# Patient Record
Sex: FEMALE | Race: WHITE | NOT HISPANIC OR LATINO | Employment: OTHER | URBAN - METROPOLITAN AREA
[De-identification: names, ages, dates, MRNs, and addresses within clinical notes are randomized per-mention and may not be internally consistent; named-entity substitution may affect disease eponyms.]

---

## 2023-09-05 ENCOUNTER — HOSPITAL ENCOUNTER (OUTPATIENT)
Dept: DATA CONVERSION | Facility: HOSPITAL | Age: 68
Discharge: HOME | End: 2023-09-05
Payer: MEDICARE

## 2023-09-05 DIAGNOSIS — Z91.81 HISTORY OF FALLING: ICD-10-CM

## 2023-09-05 DIAGNOSIS — Z88.2 ALLERGY STATUS TO SULFONAMIDES: ICD-10-CM

## 2023-09-05 DIAGNOSIS — M11.252: ICD-10-CM

## 2023-09-05 DIAGNOSIS — R26.2 DIFFICULTY IN WALKING, NOT ELSEWHERE CLASSIFIED: ICD-10-CM

## 2023-09-05 DIAGNOSIS — Z91.040 LATEX ALLERGY STATUS: ICD-10-CM

## 2023-09-05 DIAGNOSIS — M16.11 UNILATERAL PRIMARY OSTEOARTHRITIS, RIGHT HIP: ICD-10-CM

## 2023-09-05 DIAGNOSIS — M11.251 OTHER CHONDROCALCINOSIS, RIGHT HIP: ICD-10-CM

## 2023-09-05 DIAGNOSIS — M25.551 PAIN IN RIGHT HIP: ICD-10-CM

## 2023-09-05 DIAGNOSIS — Z88.0 ALLERGY STATUS TO PENICILLIN: ICD-10-CM

## 2023-09-05 LAB
ALBUMIN SERPL-MCNC: 4.3 GM/DL (ref 3.5–5)
ALBUMIN/GLOB SERPL: 1.4 RATIO (ref 1.5–3)
ALP BLD-CCNC: 70 U/L (ref 35–125)
ALT SERPL-CCNC: 19 U/L (ref 5–40)
ANION GAP SERPL CALCULATED.3IONS-SCNC: 13 MMOL/L (ref 0–19)
AST SERPL-CCNC: 22 U/L (ref 5–40)
BASOPHILS # BLD AUTO: 0.04 K/UL (ref 0–0.22)
BASOPHILS NFR BLD AUTO: 0.7 % (ref 0–1)
BILIRUB SERPL-MCNC: 0.3 MG/DL (ref 0.1–1.2)
BUN SERPL-MCNC: 37 MG/DL (ref 8–25)
BUN/CREAT SERPL: 37 RATIO (ref 8–21)
CALCIUM SERPL-MCNC: 9.7 MG/DL (ref 8.5–10.4)
CHLORIDE SERPL-SCNC: 97 MMOL/L (ref 97–107)
CK SERPL-CCNC: 82 U/L (ref 24–195)
CO2 SERPL-SCNC: 25 MMOL/L (ref 24–31)
CREAT SERPL-MCNC: 1 MG/DL (ref 0.4–1.6)
DEPRECATED RDW RBC AUTO: 47.5 FL (ref 37–54)
DIFFERENTIAL METHOD BLD: ABNORMAL
EOSINOPHIL # BLD AUTO: 0.08 K/UL (ref 0–0.45)
EOSINOPHIL NFR BLD: 1.4 % (ref 0–3)
ERYTHROCYTE [DISTWIDTH] IN BLOOD BY AUTOMATED COUNT: 13.2 % (ref 11.7–15)
GFR SERPL CREATININE-BSD FRML MDRD: 62 ML/MIN/1.73 M2
GLOBULIN SER-MCNC: 3.1 G/DL (ref 1.9–3.7)
GLUCOSE SERPL-MCNC: 152 MG/DL (ref 65–99)
HCT VFR BLD AUTO: 34.8 % (ref 36–44)
HGB BLD-MCNC: 11.7 GM/DL (ref 12–15)
IMM GRANULOCYTES # BLD AUTO: 0.02 K/UL (ref 0–0.1)
LYMPHOCYTES # BLD AUTO: 0.89 K/UL (ref 1.2–3.2)
LYMPHOCYTES NFR BLD MANUAL: 15.8 % (ref 20–40)
MCH RBC QN AUTO: 32.7 PG (ref 26–34)
MCHC RBC AUTO-ENTMCNC: 33.6 % (ref 31–37)
MCV RBC AUTO: 97.2 FL (ref 80–100)
MONOCYTES # BLD AUTO: 0.54 K/UL (ref 0–0.8)
MONOCYTES NFR BLD MANUAL: 9.6 % (ref 0–8)
NEUTROPHILS # BLD AUTO: 4.05 K/UL
NEUTROPHILS # BLD AUTO: 4.05 K/UL (ref 1.8–7.7)
NEUTROPHILS.IMMATURE NFR BLD: 0.4 % (ref 0–1)
NEUTS SEG NFR BLD: 72.1 % (ref 50–70)
NRBC BLD-RTO: 0 /100 WBC
PLATELET # BLD AUTO: 257 K/UL (ref 150–450)
PMV BLD AUTO: 10.2 CU (ref 7–12.6)
POTASSIUM SERPL-SCNC: 3.9 MMOL/L (ref 3.4–5.1)
PROT SERPL-MCNC: 7.4 G/DL (ref 5.9–7.9)
RBC # BLD AUTO: 3.58 M/UL (ref 4–4.9)
SODIUM SERPL-SCNC: 135 MMOL/L (ref 133–145)
WBC # BLD AUTO: 5.6 K/UL (ref 4.5–11)

## 2023-10-03 ENCOUNTER — TELEPHONE (OUTPATIENT)
Dept: PRIMARY CARE | Facility: CLINIC | Age: 68
End: 2023-10-03
Payer: MEDICARE

## 2023-10-03 NOTE — TELEPHONE ENCOUNTER
Delicia with [facility] called to refer patient to services. Pt is currently seen by Dr. Leighann Cantu but has a hard time getting to office d/t difficulty walking and chronic pain. Pt follows with pain management for pain but needs primary care at home. Confirmed demographics with Delicia.

## 2023-10-11 PROBLEM — M54.50 CHRONIC LOW BACK PAIN: Status: ACTIVE | Noted: 2023-10-11

## 2023-10-11 PROBLEM — G89.29 CHRONIC LOW BACK PAIN: Status: ACTIVE | Noted: 2023-10-11

## 2023-10-11 PROBLEM — M15.9 GENERALIZED OSTEOARTHRITIS OF MULTIPLE SITES: Status: ACTIVE | Noted: 2023-10-11

## 2023-10-11 PROBLEM — D50.9 IRON DEFICIENCY ANEMIA: Status: ACTIVE | Noted: 2023-10-11

## 2023-10-11 PROBLEM — R20.0 NUMBNESS: Status: ACTIVE | Noted: 2023-10-11

## 2023-10-11 PROBLEM — E66.9 OBESITY WITH BODY MASS INDEX 30 OR GREATER: Status: ACTIVE | Noted: 2023-10-11

## 2023-10-11 PROBLEM — K43.2 INCISIONAL HERNIA: Status: ACTIVE | Noted: 2023-10-11

## 2023-10-11 PROBLEM — K64.9 HEMORRHOIDS WITHOUT COMPLICATION: Status: ACTIVE | Noted: 2023-10-11

## 2023-10-11 PROBLEM — D72.819 LEUKOPENIA: Status: ACTIVE | Noted: 2023-10-11

## 2023-10-11 PROBLEM — M47.812 ARTHRITIS OF NECK: Status: ACTIVE | Noted: 2023-10-11

## 2023-10-11 PROBLEM — F41.9 ANXIETY: Status: ACTIVE | Noted: 2023-10-11

## 2023-10-11 PROBLEM — M54.12 CERVICAL RADICULAR PAIN: Status: ACTIVE | Noted: 2023-10-11

## 2023-10-11 PROBLEM — M54.16 PAIN, RADICULAR, LUMBAR: Status: ACTIVE | Noted: 2023-10-11

## 2023-10-11 PROBLEM — M96.1 POSTLAMINECTOMY SYNDROME, NOT ELSEWHERE CLASSIFIED: Status: ACTIVE | Noted: 2023-10-11

## 2023-10-11 PROBLEM — M54.16 LUMBAR RADICULITIS: Status: ACTIVE | Noted: 2023-10-11

## 2023-10-11 PROBLEM — T14.8XXA SKIN EXCORIATION: Status: ACTIVE | Noted: 2023-10-11

## 2023-10-11 PROBLEM — N82.4 COLOVAGINAL FISTULA: Status: ACTIVE | Noted: 2023-10-11

## 2023-10-11 PROBLEM — L40.9 PSORIASIS: Status: ACTIVE | Noted: 2023-10-11

## 2023-10-11 PROBLEM — I87.8 POOR VENOUS ACCESS: Status: ACTIVE | Noted: 2023-10-11

## 2023-10-11 PROBLEM — R74.01 ALT (SGPT) LEVEL RAISED: Status: ACTIVE | Noted: 2023-10-11

## 2023-10-11 PROBLEM — N17.9 ACUTE RENAL FAILURE SYNDROME (CMS-HCC): Status: ACTIVE | Noted: 2023-10-11

## 2023-10-11 PROBLEM — H53.10 DISTURBANCE, VISUAL, SUBJECTIVE: Status: ACTIVE | Noted: 2023-10-11

## 2023-10-11 PROBLEM — M19.90 ARTHRITIS: Status: ACTIVE | Noted: 2023-10-11

## 2023-10-11 PROBLEM — H53.8 BLURRED VISION, BILATERAL: Status: ACTIVE | Noted: 2023-10-11

## 2023-10-11 PROBLEM — E06.3 AUTOIMMUNE THYROIDITIS: Status: ACTIVE | Noted: 2023-10-11

## 2023-10-11 PROBLEM — M79.89 SWELLING OF UPPER ARM: Status: ACTIVE | Noted: 2023-10-11

## 2023-10-11 PROBLEM — E78.2 MIXED HYPERLIPIDEMIA: Status: ACTIVE | Noted: 2023-10-11

## 2023-10-11 PROBLEM — N82.9 FISTULA INVOLVING FEMALE GENITAL TRACT: Status: ACTIVE | Noted: 2023-10-11

## 2023-10-11 PROBLEM — R41.82 ALTERED MENTAL STATUS: Status: ACTIVE | Noted: 2023-10-11

## 2023-10-11 PROBLEM — R15.9 FECAL SOILING DUE TO FECAL INCONTINENCE: Status: ACTIVE | Noted: 2023-10-11

## 2023-10-11 PROBLEM — N30.00 ACUTE CYSTITIS WITHOUT HEMATURIA: Status: ACTIVE | Noted: 2023-10-11

## 2023-10-11 PROBLEM — H01.009 BLEPHARITIS: Status: ACTIVE | Noted: 2023-10-11

## 2023-10-11 PROBLEM — R26.2 DIFFICULTY WALKING: Status: ACTIVE | Noted: 2023-10-11

## 2023-10-11 PROBLEM — R40.1 CLOUDED CONSCIOUSNESS: Status: ACTIVE | Noted: 2023-10-11

## 2023-10-11 PROBLEM — S12.9XXA FRACTURE OF CERVICAL VERTEBRA (MULTI): Status: ACTIVE | Noted: 2023-10-11

## 2023-10-11 PROBLEM — I10 ESSENTIAL (PRIMARY) HYPERTENSION: Status: ACTIVE | Noted: 2023-10-11

## 2023-10-11 PROBLEM — G89.29 CHRONIC INTRACTABLE PAIN: Status: ACTIVE | Noted: 2023-10-11

## 2023-10-11 PROBLEM — E88.1: Status: ACTIVE | Noted: 2023-10-11

## 2023-10-11 PROBLEM — L25.9 CONTACT DERMATITIS: Status: ACTIVE | Noted: 2023-10-11

## 2023-10-11 PROBLEM — H35.00 RETINOPATHY, BACKGROUND, NONPROLIFERATIVE, MILD: Status: ACTIVE | Noted: 2023-10-11

## 2023-10-11 PROBLEM — K59.00 CONSTIPATION: Status: ACTIVE | Noted: 2023-10-11

## 2023-10-11 PROBLEM — M51.34 DDD (DEGENERATIVE DISC DISEASE), THORACIC: Status: ACTIVE | Noted: 2023-10-11

## 2023-10-11 PROBLEM — H25.13 CATARACT, NUCLEAR SCLEROTIC, BOTH EYES: Status: ACTIVE | Noted: 2023-10-11

## 2023-10-11 PROBLEM — H52.7 DISORDER OF REFRACTION: Status: ACTIVE | Noted: 2023-10-11

## 2023-10-11 PROBLEM — D31.32 CHOROIDAL NEVUS, LEFT EYE: Status: ACTIVE | Noted: 2023-10-11

## 2023-10-11 PROBLEM — N89.9 VAGINAL DISORDER: Status: ACTIVE | Noted: 2023-10-11

## 2023-10-11 PROBLEM — M79.10 MYALGIA: Status: ACTIVE | Noted: 2023-10-11

## 2023-10-11 PROBLEM — H02.839 DERMATOCHALASIS: Status: ACTIVE | Noted: 2023-10-11

## 2023-10-11 PROBLEM — H26.9 EARLY CATARACT: Status: ACTIVE | Noted: 2023-10-11

## 2023-10-11 PROBLEM — K21.9 GASTROESOPHAGEAL REFLUX DISEASE: Status: ACTIVE | Noted: 2023-10-11

## 2023-10-11 PROBLEM — N32.81 OVERACTIVE BLADDER: Status: ACTIVE | Noted: 2023-10-11

## 2023-10-11 PROBLEM — H50.10 CONSECUTIVE EXOTROPIA: Status: ACTIVE | Noted: 2023-10-11

## 2023-10-11 PROBLEM — R19.4 CHANGE IN BOWEL HABITS: Status: ACTIVE | Noted: 2023-10-11

## 2023-10-11 PROBLEM — N39.0 RECURRENT URINARY TRACT INFECTION: Status: ACTIVE | Noted: 2023-10-11

## 2023-10-11 PROBLEM — H51.0 PALSY OF CONJUGATE GAZE: Status: ACTIVE | Noted: 2023-10-11

## 2023-10-11 PROBLEM — E10.9 CONTROLLED INSULIN DEPENDENT TYPE 1 DIABETES MELLITUS (MULTI): Status: ACTIVE | Noted: 2023-10-11

## 2023-10-11 PROBLEM — M48.062 NEUROGENIC CLAUDICATION DUE TO LUMBAR SPINAL STENOSIS: Status: ACTIVE | Noted: 2023-10-11

## 2023-10-11 PROBLEM — E11.9 TYPE 2 DIABETES MELLITUS (MULTI): Status: ACTIVE | Noted: 2023-10-11

## 2023-10-11 PROBLEM — M79.18 MYOFASCIAL PAIN: Status: ACTIVE | Noted: 2023-10-11

## 2023-10-11 PROBLEM — L03.90 CELLULITIS: Status: ACTIVE | Noted: 2023-10-11

## 2023-10-11 PROBLEM — R74.8 ELEVATED CREATINE KINASE: Status: ACTIVE | Noted: 2023-10-11

## 2023-10-11 PROBLEM — R15.9 ANAL SPHINCTER INCONTINENCE: Status: ACTIVE | Noted: 2023-10-11

## 2023-10-11 PROBLEM — H04.123 DRY EYES: Status: ACTIVE | Noted: 2023-10-11

## 2023-10-11 PROBLEM — M79.89 SOFT TISSUE MASS: Status: ACTIVE | Noted: 2023-10-11

## 2023-10-11 PROBLEM — Z96.1 PSEUDOPHAKIA OF BOTH EYES: Status: ACTIVE | Noted: 2023-10-11

## 2023-10-11 PROBLEM — L40.50 PSORIATIC ARTHRITIS (MULTI): Status: ACTIVE | Noted: 2023-10-11

## 2023-10-11 PROBLEM — K57.30 DIVERTICULOSIS OF COLON: Status: ACTIVE | Noted: 2023-10-11

## 2023-10-11 PROBLEM — N82.3 FISTULA OF VAGINA TO LARGE INTESTINE: Status: ACTIVE | Noted: 2023-10-11

## 2023-10-11 PROBLEM — R53.1 WEAKNESS GENERALIZED: Status: ACTIVE | Noted: 2023-10-11

## 2023-10-11 RX ORDER — BUPROPION HYDROCHLORIDE 150 MG/1
150 TABLET ORAL DAILY
COMMUNITY
End: 2023-10-12 | Stop reason: ALTCHOICE

## 2023-10-11 RX ORDER — ACETAMINOPHEN 500 MG
5 TABLET ORAL NIGHTLY PRN
COMMUNITY
End: 2023-10-12 | Stop reason: ALTCHOICE

## 2023-10-11 RX ORDER — METFORMIN HYDROCHLORIDE 500 MG/1
500 TABLET ORAL 2 TIMES DAILY
COMMUNITY
End: 2023-10-12 | Stop reason: ALTCHOICE

## 2023-10-11 RX ORDER — HEPARIN 100 UNIT/ML
500 SYRINGE INTRAVENOUS AS NEEDED
Status: CANCELLED | OUTPATIENT
Start: 2023-10-11

## 2023-10-11 RX ORDER — ACETAMINOPHEN 325 MG/1
650 TABLET ORAL EVERY 4 HOURS PRN
COMMUNITY

## 2023-10-11 RX ORDER — BUPROPION HYDROCHLORIDE 300 MG/1
TABLET ORAL
COMMUNITY
Start: 2021-09-17 | End: 2023-10-12 | Stop reason: ALTCHOICE

## 2023-10-11 RX ORDER — BIOTIN 1 MG
1000 TABLET ORAL DAILY
COMMUNITY
End: 2023-10-12 | Stop reason: ALTCHOICE

## 2023-10-11 RX ORDER — LIDOCAINE 50 MG/G
1 PATCH TOPICAL DAILY
COMMUNITY
End: 2023-10-12

## 2023-10-11 RX ORDER — ACYCLOVIR 50 MG/G
OINTMENT TOPICAL
COMMUNITY
Start: 2021-06-04 | End: 2023-10-12 | Stop reason: ALTCHOICE

## 2023-10-11 RX ORDER — OXYBUTYNIN CHLORIDE 5 MG/1
5 TABLET ORAL 2 TIMES DAILY
COMMUNITY
Start: 2018-05-22 | End: 2023-10-12 | Stop reason: ALTCHOICE

## 2023-10-11 RX ORDER — ALBUTEROL SULFATE 0.83 MG/ML
3 SOLUTION RESPIRATORY (INHALATION) AS NEEDED
Status: CANCELLED | OUTPATIENT
Start: 2023-10-11

## 2023-10-11 RX ORDER — NAPROXEN SODIUM 220 MG/1
81 TABLET, FILM COATED ORAL DAILY
COMMUNITY

## 2023-10-11 RX ORDER — PHENYLEPHRINE HCL 10 MG
TABLET ORAL DAILY
COMMUNITY
End: 2023-10-12 | Stop reason: ALTCHOICE

## 2023-10-11 RX ORDER — CALC/MAG/B COMPLEX/D3/HERB 61
15 TABLET ORAL DAILY
COMMUNITY
End: 2023-10-12 | Stop reason: ALTCHOICE

## 2023-10-11 RX ORDER — PREDNISONE 10 MG/1
TABLET ORAL
COMMUNITY
Start: 2023-09-13 | End: 2023-10-12 | Stop reason: ALTCHOICE

## 2023-10-11 RX ORDER — PIOGLITAZONEHYDROCHLORIDE 30 MG/1
1 TABLET ORAL DAILY
COMMUNITY
Start: 2021-10-04 | End: 2023-10-12 | Stop reason: ALTCHOICE

## 2023-10-11 RX ORDER — DOCUSATE SODIUM 100 MG/1
100 CAPSULE, LIQUID FILLED ORAL 2 TIMES DAILY PRN
COMMUNITY
Start: 2021-12-13

## 2023-10-11 RX ORDER — EPINEPHRINE 0.3 MG/.3ML
0.3 INJECTION SUBCUTANEOUS EVERY 5 MIN PRN
Status: CANCELLED | OUTPATIENT
Start: 2023-10-11

## 2023-10-11 RX ORDER — CEPHALEXIN 500 MG/1
500 CAPSULE ORAL 3 TIMES DAILY
COMMUNITY
Start: 2023-06-28 | End: 2023-10-12 | Stop reason: ALTCHOICE

## 2023-10-11 RX ORDER — INSULIN GLARGINE 300 U/ML
6 INJECTION, SOLUTION SUBCUTANEOUS DAILY
COMMUNITY
End: 2023-10-12 | Stop reason: ALTCHOICE

## 2023-10-11 RX ORDER — POLYETHYLENE GLYCOL 3350 17 G/17G
17 POWDER, FOR SOLUTION ORAL EVERY MORNING
COMMUNITY
End: 2023-10-12 | Stop reason: ALTCHOICE

## 2023-10-11 RX ORDER — LISINOPRIL 10 MG/1
TABLET ORAL
COMMUNITY
Start: 2015-05-15 | End: 2023-10-12 | Stop reason: ALTCHOICE

## 2023-10-11 RX ORDER — CHOLECALCIFEROL (VITAMIN D3) 50 MCG
1 TABLET ORAL DAILY
COMMUNITY
End: 2023-10-12 | Stop reason: ALTCHOICE

## 2023-10-11 RX ORDER — LEFLUNOMIDE 10 MG/1
10 TABLET ORAL DAILY
COMMUNITY
End: 2023-10-12 | Stop reason: SDUPTHER

## 2023-10-11 RX ORDER — ESCITALOPRAM OXALATE 10 MG/1
TABLET ORAL
COMMUNITY
Start: 2021-09-17 | End: 2023-10-12 | Stop reason: ALTCHOICE

## 2023-10-11 RX ORDER — MAGNESIUM HYDROXIDE 400 MG/5ML
1 SUSPENSION, ORAL (FINAL DOSE FORM) ORAL DAILY
COMMUNITY

## 2023-10-11 RX ORDER — OMEGA-3 FATTY ACIDS 1000 MG
1 CAPSULE ORAL DAILY
COMMUNITY
End: 2023-10-12 | Stop reason: ALTCHOICE

## 2023-10-11 RX ORDER — ATORVASTATIN CALCIUM 40 MG/1
1 TABLET, FILM COATED ORAL DAILY
COMMUNITY
Start: 2021-10-04 | End: 2023-10-12 | Stop reason: SDUPTHER

## 2023-10-11 RX ORDER — MAGNESIUM 250 MG
1 TABLET ORAL DAILY
COMMUNITY
End: 2023-10-12 | Stop reason: ALTCHOICE

## 2023-10-11 RX ORDER — INSULIN DEGLUDEC 100 U/ML
8 INJECTION, SOLUTION SUBCUTANEOUS EVERY MORNING
COMMUNITY
End: 2024-04-19 | Stop reason: SDUPTHER

## 2023-10-11 RX ORDER — NALOXONE HYDROCHLORIDE 4 MG/.1ML
SPRAY NASAL
COMMUNITY

## 2023-10-11 RX ORDER — BLOOD SUGAR DIAGNOSTIC
STRIP MISCELLANEOUS DAILY
COMMUNITY
End: 2024-05-10 | Stop reason: SDUPTHER

## 2023-10-11 RX ORDER — LEVOTHYROXINE SODIUM 100 UG/1
100 TABLET ORAL DAILY
COMMUNITY
Start: 2011-11-20 | End: 2023-10-12 | Stop reason: DRUGHIGH

## 2023-10-11 RX ORDER — BISACODYL 10 MG/1
10 SUPPOSITORY RECTAL DAILY
COMMUNITY
Start: 2018-12-07 | End: 2023-10-12 | Stop reason: ALTCHOICE

## 2023-10-11 RX ORDER — VANCOMYCIN HYDROCHLORIDE 250 MG/1
CAPSULE ORAL
COMMUNITY
Start: 2021-10-20 | End: 2023-10-12 | Stop reason: ALTCHOICE

## 2023-10-11 RX ORDER — OXYBUTYNIN CHLORIDE 5 MG/1
5 TABLET, EXTENDED RELEASE ORAL DAILY
COMMUNITY
End: 2023-10-12 | Stop reason: ALTCHOICE

## 2023-10-11 RX ORDER — SYRING-NEEDL,DISP,INSUL,0.3 ML 29 G X1/2"
SYRINGE, EMPTY DISPOSABLE MISCELLANEOUS
COMMUNITY
Start: 2016-09-26 | End: 2023-10-12 | Stop reason: ALTCHOICE

## 2023-10-11 RX ORDER — CLINDAMYCIN HYDROCHLORIDE 300 MG/1
300 CAPSULE ORAL 3 TIMES DAILY
COMMUNITY
Start: 2023-03-02 | End: 2023-10-12 | Stop reason: ALTCHOICE

## 2023-10-11 RX ORDER — CYCLOBENZAPRINE HCL 10 MG
10 TABLET ORAL 2 TIMES DAILY PRN
COMMUNITY
Start: 2023-09-18 | End: 2023-10-30 | Stop reason: SDUPTHER

## 2023-10-11 RX ORDER — LIDOCAINE 560 MG/1
1 PATCH PERCUTANEOUS; TOPICAL; TRANSDERMAL DAILY PRN
COMMUNITY
Start: 2023-09-05 | End: 2023-10-12

## 2023-10-11 RX ORDER — CYCLOBENZAPRINE HCL 10 MG
10 TABLET ORAL 3 TIMES DAILY PRN
COMMUNITY
Start: 2019-11-11 | End: 2023-10-12 | Stop reason: DRUGHIGH

## 2023-10-11 RX ORDER — MUPIROCIN 20 MG/G
OINTMENT TOPICAL
COMMUNITY
Start: 2022-02-08 | End: 2023-10-12 | Stop reason: ALTCHOICE

## 2023-10-11 RX ORDER — DAPAGLIFLOZIN 10 MG/1
10 TABLET, FILM COATED ORAL DAILY
COMMUNITY
End: 2023-10-12

## 2023-10-11 RX ORDER — ESCITALOPRAM OXALATE 5 MG/1
5 TABLET ORAL DAILY
COMMUNITY
Start: 2021-12-13 | End: 2023-10-12 | Stop reason: ALTCHOICE

## 2023-10-11 RX ORDER — VITAMIN E MIXED 400 UNIT
1 CAPSULE ORAL DAILY
COMMUNITY
End: 2023-10-12 | Stop reason: ALTCHOICE

## 2023-10-11 RX ORDER — GLYBURIDE 2.5 MG/1
2.5 TABLET ORAL
COMMUNITY
End: 2023-10-12 | Stop reason: ALTCHOICE

## 2023-10-11 RX ORDER — FUROSEMIDE 40 MG/1
1 TABLET ORAL DAILY
COMMUNITY
Start: 2011-08-03 | End: 2023-10-12 | Stop reason: SDUPTHER

## 2023-10-11 RX ORDER — FAMOTIDINE 10 MG/ML
20 INJECTION INTRAVENOUS ONCE AS NEEDED
Status: CANCELLED | OUTPATIENT
Start: 2023-10-11

## 2023-10-11 RX ORDER — HYDROMORPHONE HYDROCHLORIDE 4 MG/1
4 TABLET ORAL EVERY 6 HOURS PRN
COMMUNITY
Start: 2022-11-09 | End: 2023-10-12 | Stop reason: DRUGHIGH

## 2023-10-11 RX ORDER — MAGNESIUM 200 MG
2 TABLET ORAL DAILY
COMMUNITY
End: 2023-10-12 | Stop reason: ALTCHOICE

## 2023-10-11 RX ORDER — METFORMIN HYDROCHLORIDE 500 MG/1
500 TABLET, EXTENDED RELEASE ORAL 2 TIMES DAILY
COMMUNITY
Start: 2023-09-10 | End: 2023-10-12 | Stop reason: ALTCHOICE

## 2023-10-11 RX ORDER — PRAVASTATIN SODIUM 40 MG/1
40 TABLET ORAL DAILY
COMMUNITY
Start: 2019-08-05 | End: 2023-10-12 | Stop reason: ALTCHOICE

## 2023-10-11 RX ORDER — HEPARIN SODIUM,PORCINE/PF 10 UNIT/ML
50 SYRINGE (ML) INTRAVENOUS AS NEEDED
OUTPATIENT
Start: 2023-10-11

## 2023-10-11 RX ORDER — ZINC GLUCONATE 13.3 MG
1 LOZENGE ORAL 2 TIMES DAILY PRN
COMMUNITY
Start: 2021-12-13 | End: 2023-10-12 | Stop reason: ALTCHOICE

## 2023-10-11 RX ORDER — LEFLUNOMIDE 20 MG/1
20 TABLET ORAL DAILY
COMMUNITY
End: 2023-10-12 | Stop reason: DRUGHIGH

## 2023-10-11 RX ORDER — NAPROXEN SODIUM 220 MG/1
1 TABLET ORAL DAILY
COMMUNITY
End: 2023-10-12 | Stop reason: ALTCHOICE

## 2023-10-11 RX ORDER — TRIMETHOPRIM 100 MG/1
100 TABLET ORAL DAILY
COMMUNITY
End: 2023-10-12 | Stop reason: ALTCHOICE

## 2023-10-11 RX ORDER — BUPROPION HYDROCHLORIDE 100 MG/1
50 TABLET ORAL DAILY
COMMUNITY
End: 2023-10-12 | Stop reason: SDUPTHER

## 2023-10-11 RX ORDER — INSULIN LISPRO 100 [IU]/ML
INJECTION, SOLUTION INTRAVENOUS; SUBCUTANEOUS
COMMUNITY
End: 2023-10-12 | Stop reason: ALTCHOICE

## 2023-10-11 RX ORDER — LISINOPRIL 20 MG/1
20 TABLET ORAL DAILY
COMMUNITY
End: 2023-10-12 | Stop reason: SDUPTHER

## 2023-10-11 RX ORDER — LEVOTHYROXINE SODIUM 75 UG/1
TABLET ORAL
COMMUNITY
Start: 2021-09-17 | End: 2023-10-12 | Stop reason: SDUPTHER

## 2023-10-11 RX ORDER — HYDROMORPHONE HYDROCHLORIDE 2 MG/1
2 TABLET ORAL 2 TIMES DAILY
COMMUNITY
End: 2023-10-30 | Stop reason: SDUPTHER

## 2023-10-11 RX ORDER — GABAPENTIN 300 MG/1
300 CAPSULE ORAL
COMMUNITY
Start: 2019-03-05 | End: 2023-10-30 | Stop reason: SDUPTHER

## 2023-10-11 RX ORDER — TRAMADOL HYDROCHLORIDE 50 MG/1
50 TABLET ORAL EVERY 6 HOURS PRN
COMMUNITY
Start: 2023-06-30 | End: 2023-10-12 | Stop reason: ALTCHOICE

## 2023-10-11 RX ORDER — HYDROGEN PEROXIDE 3 %
1 SOLUTION, NON-ORAL MISCELLANEOUS DAILY
COMMUNITY
End: 2023-10-12 | Stop reason: ALTCHOICE

## 2023-10-11 RX ORDER — DIPHENHYDRAMINE HYDROCHLORIDE 50 MG/ML
50 INJECTION INTRAMUSCULAR; INTRAVENOUS AS NEEDED
Status: CANCELLED | OUTPATIENT
Start: 2023-10-11

## 2023-10-12 ENCOUNTER — OFFICE VISIT (OUTPATIENT)
Dept: PRIMARY CARE | Facility: CLINIC | Age: 68
End: 2023-10-12
Payer: MEDICARE

## 2023-10-12 VITALS
RESPIRATION RATE: 16 BRPM | WEIGHT: 152 LBS | SYSTOLIC BLOOD PRESSURE: 100 MMHG | DIASTOLIC BLOOD PRESSURE: 58 MMHG | BODY MASS INDEX: 29.69 KG/M2 | HEART RATE: 75 BPM | OXYGEN SATURATION: 93 % | TEMPERATURE: 99.2 F

## 2023-10-12 DIAGNOSIS — L40.50 PSORIATIC ARTHRITIS (MULTI): ICD-10-CM

## 2023-10-12 DIAGNOSIS — R26.2 DIFFICULTY WALKING: ICD-10-CM

## 2023-10-12 DIAGNOSIS — R60.0 LOWER EXTREMITY EDEMA: ICD-10-CM

## 2023-10-12 DIAGNOSIS — M54.50 CHRONIC LOW BACK PAIN, UNSPECIFIED BACK PAIN LATERALITY, UNSPECIFIED WHETHER SCIATICA PRESENT: ICD-10-CM

## 2023-10-12 DIAGNOSIS — M15.9 GENERALIZED OSTEOARTHRITIS OF MULTIPLE SITES: ICD-10-CM

## 2023-10-12 DIAGNOSIS — G89.29 CHRONIC LOW BACK PAIN, UNSPECIFIED BACK PAIN LATERALITY, UNSPECIFIED WHETHER SCIATICA PRESENT: ICD-10-CM

## 2023-10-12 DIAGNOSIS — I10 ESSENTIAL (PRIMARY) HYPERTENSION: ICD-10-CM

## 2023-10-12 DIAGNOSIS — F41.9 ANXIETY: ICD-10-CM

## 2023-10-12 DIAGNOSIS — E06.3 AUTOIMMUNE THYROIDITIS: ICD-10-CM

## 2023-10-12 DIAGNOSIS — E11.9 TYPE 2 DIABETES MELLITUS WITHOUT COMPLICATION, WITH LONG-TERM CURRENT USE OF INSULIN (MULTI): Primary | ICD-10-CM

## 2023-10-12 DIAGNOSIS — Z79.4 TYPE 2 DIABETES MELLITUS WITHOUT COMPLICATION, WITH LONG-TERM CURRENT USE OF INSULIN (MULTI): Primary | ICD-10-CM

## 2023-10-12 DIAGNOSIS — E78.2 MIXED HYPERLIPIDEMIA: ICD-10-CM

## 2023-10-12 PROCEDURE — 1159F MED LIST DOCD IN RCRD: CPT | Performed by: NURSE PRACTITIONER

## 2023-10-12 PROCEDURE — 4010F ACE/ARB THERAPY RXD/TAKEN: CPT | Performed by: NURSE PRACTITIONER

## 2023-10-12 PROCEDURE — 3074F SYST BP LT 130 MM HG: CPT | Performed by: NURSE PRACTITIONER

## 2023-10-12 PROCEDURE — 3078F DIAST BP <80 MM HG: CPT | Performed by: NURSE PRACTITIONER

## 2023-10-12 PROCEDURE — 1125F AMNT PAIN NOTED PAIN PRSNT: CPT | Performed by: NURSE PRACTITIONER

## 2023-10-12 PROCEDURE — 1160F RVW MEDS BY RX/DR IN RCRD: CPT | Performed by: NURSE PRACTITIONER

## 2023-10-12 PROCEDURE — 3044F HG A1C LEVEL LT 7.0%: CPT | Performed by: NURSE PRACTITIONER

## 2023-10-12 PROCEDURE — 99350 HOME/RES VST EST HIGH MDM 60: CPT | Performed by: NURSE PRACTITIONER

## 2023-10-12 RX ORDER — BUPROPION HYDROCHLORIDE 100 MG/1
50 TABLET ORAL DAILY
Qty: 45 TABLET | Refills: 2 | Status: SHIPPED | OUTPATIENT
Start: 2023-10-12 | End: 2023-11-10 | Stop reason: SDUPTHER

## 2023-10-12 RX ORDER — FUROSEMIDE 40 MG/1
40 TABLET ORAL DAILY
Qty: 90 TABLET | Refills: 3 | Status: SHIPPED | OUTPATIENT
Start: 2023-10-12 | End: 2024-10-11

## 2023-10-12 RX ORDER — DAPAGLIFLOZIN 10 MG/1
10 TABLET, FILM COATED ORAL DAILY
Qty: 90 TABLET | Refills: 3 | Status: SHIPPED | OUTPATIENT
Start: 2023-10-12 | End: 2024-05-15

## 2023-10-12 RX ORDER — GABAPENTIN 300 MG/1
900 CAPSULE ORAL NIGHTLY
COMMUNITY
End: 2024-02-26 | Stop reason: WASHOUT

## 2023-10-12 RX ORDER — DAPAGLIFLOZIN 10 MG/1
10 TABLET, FILM COATED ORAL DAILY
COMMUNITY
End: 2023-10-12 | Stop reason: SDUPTHER

## 2023-10-12 RX ORDER — ATORVASTATIN CALCIUM 40 MG/1
40 TABLET, FILM COATED ORAL DAILY
Qty: 90 TABLET | Refills: 2 | Status: SHIPPED | OUTPATIENT
Start: 2023-10-12 | End: 2024-05-20

## 2023-10-12 RX ORDER — LEFLUNOMIDE 10 MG/1
10 TABLET ORAL DAILY
Qty: 90 TABLET | Refills: 3 | Status: SHIPPED | OUTPATIENT
Start: 2023-10-12 | End: 2024-10-11

## 2023-10-12 RX ORDER — LEVOTHYROXINE SODIUM 75 UG/1
75 TABLET ORAL
Qty: 90 TABLET | Refills: 3 | Status: SHIPPED | OUTPATIENT
Start: 2023-10-12 | End: 2024-10-11

## 2023-10-12 RX ORDER — LISINOPRIL 20 MG/1
20 TABLET ORAL DAILY
Qty: 90 TABLET | Refills: 3 | Status: SHIPPED | OUTPATIENT
Start: 2023-10-12 | End: 2024-10-11

## 2023-10-12 ASSESSMENT — ENCOUNTER SYMPTOMS
SHORTNESS OF BREATH: 1
MYALGIAS: 1
EYES NEGATIVE: 1
BRUISES/BLEEDS EASILY: 1
DIARRHEA: 1
ARTHRALGIAS: 1
FATIGUE: 1
DEPRESSION: 1
PSYCHIATRIC NEGATIVE: 1
LOSS OF SENSATION IN FEET: 0
JOINT SWELLING: 1
NECK PAIN: 1
ENDOCRINE NEGATIVE: 1
NECK STIFFNESS: 1
BACK PAIN: 1
OCCASIONAL FEELINGS OF UNSTEADINESS: 1
WEAKNESS: 1

## 2023-10-12 ASSESSMENT — PAIN SCALES - GENERAL: PAINLEVEL: 7

## 2023-10-12 NOTE — PROGRESS NOTES
Subjective   Patient ID: Nesha Mcleod is a 68 y.o. female who presents to establish care with house calls program.    HPI Pt seen in private apartment in [facility]. Pt has been residing in a couple months.  passed away in March. Pt with limited mobility secondary to severe arthritis throughout body. Pt states she has had 59 surgeries in the past. The latest being shoulder surgery in June 2023. Pt lives alone and has some help from her nephew. She did have a niece that was involved with her care who passed away suddenly two weeks ago. Pt independent with her ADL's. Has shower chair and grab bars in bathroom. Uses crock pot or microwave for meals as she can not stand for long periods of time. Pt drives once in while to get her groceries. Pt was referred to house calls from her home care [facility] which finished yesterday. Pts PCP is Dr. Cantu but pt says she is no longer going to see her. Pt follows with pain management Dr. Cisneros whom she sees every 3 months. Pt states she is never pain free. Reports she does try to do some chair exercises. Pt checks blood sugars daily reports they mainly run in 120-130's but with an occasional 170 when she eats improperly. Pt only taking her Tresiba when her blood sugar is elevated above 170. Pt ran out of her Jardiance a week ago and can not afford it as it is over $500 a month. Last A1c was 5.7 on 9/23. Pt with hospital bed in her living room that she is no longer using. States she was using it when she was having more difficulty walking but she can now get to her bedroom to sleep. Pt receiving golimumab infusions at infusion center for her psoriatic arthritis  every 8 weeks which helps her pain.  Pt denies fevers, chills, night sweats, fevers, or headaches. Denies vision or hearing changes. Denies difficulty with swallowing or choking issues. Denies cough, occasional SOB. Denies CP or palpitations. C/O lower extremity edema. Denies N/V admits  to occasional diarrhea and constipation. States appetite is fair and she is sleeping well.    Medically necessary due to: pt has chronic condition that makes access to a traditional office visit very difficult, illness or condition that results in activity limitation or restriction that impacts the ability to leave home such as; unsteady gait/ poor condition.    Review of Systems   Constitutional:  Positive for fatigue.   HENT: Negative.     Eyes: Negative.    Respiratory:  Positive for shortness of breath.    Cardiovascular:  Positive for leg swelling.   Gastrointestinal:  Positive for diarrhea.   Endocrine: Negative.    Musculoskeletal:  Positive for arthralgias, back pain, gait problem, joint swelling, myalgias, neck pain and neck stiffness.   Skin:  Positive for pallor.   Allergic/Immunologic: Positive for environmental allergies and food allergies.   Neurological:  Positive for weakness.   Hematological:  Bruises/bleeds easily.   Psychiatric/Behavioral: Negative.         Objective   /58 (BP Location: Right arm)   Pulse 75   Temp 37.3 °C (99.2 °F) (Temporal)   Resp 16   Wt 68.9 kg (152 lb)   SpO2 93%   BMI 29.69 kg/m²     Physical Exam  Constitutional:       Appearance: Normal appearance. She is normal weight.      Comments: Pt with glasses   HENT:      Head: Normocephalic.      Nose: Nose normal.      Mouth/Throat:      Mouth: Mucous membranes are moist.   Eyes:      Pupils: Pupils are equal, round, and reactive to light.   Cardiovascular:      Rate and Rhythm: Normal rate and regular rhythm.      Pulses: Normal pulses.      Heart sounds: Normal heart sounds.   Pulmonary:      Effort: Pulmonary effort is normal.      Breath sounds: Decreased air movement (with fair air exchange) present. Examination of the right-upper field reveals decreased breath sounds. Examination of the left-upper field reveals decreased breath sounds. Examination of the right-middle field reveals decreased breath sounds.  Examination of the left-middle field reveals decreased breath sounds. Examination of the right-lower field reveals decreased breath sounds. Examination of the left-lower field reveals decreased breath sounds. Decreased breath sounds present.   Abdominal:      General: Bowel sounds are normal.      Palpations: Abdomen is soft.   Musculoskeletal:         General: Swelling and tenderness present.      Cervical back: Normal range of motion.      Right lower le+ Edema present.      Left lower le+ Edema present.   Skin:     General: Skin is warm and dry.      Capillary Refill: Capillary refill takes less than 2 seconds.      Findings: Bruising present.   Neurological:      General: No focal deficit present.      Mental Status: She is alert and oriented to person, place, and time.   Psychiatric:         Mood and Affect: Mood normal.         Behavior: Behavior normal. Behavior is cooperative.         Thought Content: Thought content normal.         Judgment: Judgment normal.         Initial encounter for House Calls NP visit. Program explained and contact information provided, all questions answered with apparent satisfaction.  More than 50% of time spent in face to face discussion a total of 60 minutes with this patient on counseling, coordination, of care, collaboration with staff and family, and review of medical records and  diagnostics      Assessment/Plan   Diagnoses and all orders for this visit:  Type 2 diabetes mellitus without complication, with long-term current use of insulin (CMS/[facility])  Comments:  Monitor blood sugars daily, continue with tresiba and Farxiga. Diabetic diet  Orders:  -     dapagliflozin propanediol (Farxiga) 10 mg; Take 1 tablet (10 mg) by mouth once daily.  Mixed hyperlipidemia  Comments:  Low fat diet. Continue Atorvastatin  Orders:  -     atorvastatin (Lipitor) 40 mg tablet; Take 1 tablet (40 mg) by mouth once daily.  Psoriatic arthritis (CMS/[facility])  Comments:  Continue golimumab  infusions  Orders:  -     leflunomide (Arava) 10 mg tablet; Take 1 tablet (10 mg) by mouth once daily.  Generalized osteoarthritis of multiple sites  Comments:  Continue pain meds. Continue to ambulate with rollator  Chronic low back pain, unspecified back pain laterality, unspecified whether sciatica present  Anxiety  Comments:  Continue Buproprion  Orders:  -     buPROPion (Wellbutrin) 100 mg tablet; Take 0.5 tablets (50 mg) by mouth once daily.  Lower extremity edema  Comments:  Contimue lasix, elevatelegs when sitting, low sodium diet  Orders:  -     furosemide (Lasix) 40 mg tablet; Take 1 tablet (40 mg) by mouth once daily.  Autoimmune thyroiditis  Comments:  Continue Levothyroxine  Orders:  -     levothyroxine (Synthroid, Levoxyl) 75 mcg tablet; Take 1 tablet (75 mcg) by mouth once daily in the morning. Take before meals.  Essential (primary) hypertension  Comments:  Continue Lisinopril  Orders:  -     lisinopril 20 mg tablet; Take 1 tablet (20 mg) by mouth once daily.  Difficulty walking  Comments:  Try to increase ambulation using rollator     Angela Dyer, APRN-CNP

## 2023-10-17 ENCOUNTER — APPOINTMENT (OUTPATIENT)
Dept: INFUSION THERAPY | Facility: CLINIC | Age: 68
End: 2023-10-17
Payer: MEDICARE

## 2023-10-17 ENCOUNTER — INFUSION (OUTPATIENT)
Dept: INFUSION THERAPY | Facility: CLINIC | Age: 68
End: 2023-10-17
Payer: MEDICARE

## 2023-10-17 VITALS
DIASTOLIC BLOOD PRESSURE: 67 MMHG | BODY MASS INDEX: 29.72 KG/M2 | SYSTOLIC BLOOD PRESSURE: 106 MMHG | OXYGEN SATURATION: 97 % | RESPIRATION RATE: 18 BRPM | TEMPERATURE: 97.5 F | WEIGHT: 152.2 LBS | HEART RATE: 85 BPM

## 2023-10-17 DIAGNOSIS — L40.50 PSORIATIC ARTHRITIS (MULTI): Primary | ICD-10-CM

## 2023-10-17 PROCEDURE — 96365 THER/PROPH/DIAG IV INF INIT: CPT | Mod: INF | Performed by: INTERNAL MEDICINE

## 2023-10-17 PROCEDURE — 96365 THER/PROPH/DIAG IV INF INIT: CPT | Mod: INF

## 2023-10-17 PROCEDURE — 2500000004 HC RX 250 GENERAL PHARMACY W/ HCPCS (ALT 636 FOR OP/ED): Performed by: INTERNAL MEDICINE

## 2023-10-17 RX ORDER — EPINEPHRINE 0.3 MG/.3ML
0.3 INJECTION SUBCUTANEOUS EVERY 5 MIN PRN
Status: CANCELLED | OUTPATIENT
Start: 2023-11-14

## 2023-10-17 RX ORDER — FAMOTIDINE 10 MG/ML
20 INJECTION INTRAVENOUS ONCE AS NEEDED
Status: CANCELLED | OUTPATIENT
Start: 2023-11-14

## 2023-10-17 RX ORDER — DIPHENHYDRAMINE HYDROCHLORIDE 50 MG/ML
50 INJECTION INTRAMUSCULAR; INTRAVENOUS AS NEEDED
Status: CANCELLED | OUTPATIENT
Start: 2023-11-14

## 2023-10-17 RX ORDER — ALBUTEROL SULFATE 0.83 MG/ML
3 SOLUTION RESPIRATORY (INHALATION) AS NEEDED
Status: CANCELLED | OUTPATIENT
Start: 2023-11-14

## 2023-10-17 RX ADMIN — GOLIMUMAB 137.5 MG: 50 SOLUTION INTRAVENOUS at 09:03

## 2023-10-17 ASSESSMENT — PAIN SCALES - GENERAL: PAINLEVEL: 6

## 2023-10-17 NOTE — PATIENT INSTRUCTIONS
Today you received: ( free text drug name and dose, including premedication's)  Golimumab    For:   1. Psoriatic arthritis (CMS/[facility])          Please read the  Medication Guide that was given to you and reviewed during todays visit.     (Tell all doctors including dentists that you are taking this medication)     Go to the emergency room or call 911 if:  -You have signs of allergic reaction:   o         Rash, hives, itching.   o         Swollen, blistered, peeling skin.   o         Swelling of face, lips, mouth, tongue or throat.   o         Tightness of chest, trouble breathing, swallowing or talking      Call your doctor:     - If IV / injection site gets red, warm, swollen, itchy or leaks fluid or pus.     (Leave dressing on your IV site for at least 2 hours and keep area clean and dry  - If you get sick or have symptoms of infection or are not feeling well for any reason.    (Wash your hands often, stay away from people who are sick)  - If you have side effects from your medication that do not go away or are bothersome.     (Refer to the teaching your nurse gave you for side effects to call your doctor about)     Common side effects may include:  stuffy nose, headache, feeling tired, muscle aches, upset stomach  - Before receiving any vaccines, Call the [facility] at   if:  - You get sick, are on antibiotics, have had a recent vaccine, have surgery or dental work and your doctor wants your visit rescheduled.  - You need to cancel and reschedule your visit for any reason. Call at least 2 days before your visit if you need to cancel.   - Your insurance changes before your next visit.    (We will need to get approval from your new insurance. This can take up to two weeks.)     The [facility] is opened Monday thru Friday. We are closed on weekends and holidays.     Voice mail will take your call if the center is closed. If you leave a message please allow 24 hours for a call back  during weekdays. If you leave a message on a weekend/holiday, we will call you back the next business day.

## 2023-10-17 NOTE — PROGRESS NOTES
[facility]   infusion Clinic Note   Date: 2023   Name: Nesha Mcleod  : 1955   MRN: [id number]         Reason for Visit:   OP Infusion (Golimumab)      Accompanied by:Self   Visit Type:: Infusion   Diagnosis: Psoriatic arthritis (CMS/[facility])    Allergies:   Allergies as of 10/17/2023 - Reviewed 10/17/2023   Allergen Reaction Noted    Citalopram Other 10/11/2023    Methotrexate Swelling 10/11/2023    Trazodone Other 10/11/2023    Adhesive tape-silicones Hives 10/11/2023    Bleach (sodium hypochlorite) Unknown and Hives 10/11/2023    Chlorine Unknown and Other 10/11/2023    Ciprofloxacin Other and Unknown 10/11/2023    Codeine Hives 10/11/2023    Doxycycline Nausea And Vomiting and Unknown 10/11/2023    Latex Hives and Unknown 10/11/2023    Morphine Hives 10/11/2023    Penicillins Hives 10/11/2023    Soap Unknown and Hives 10/11/2023    Tetracycline Hives 10/11/2023    Infliximab Unknown 10/11/2023    Other Hives and Unknown 10/11/2023    Rubber, unspecified Unknown 10/11/2023    Trace metals Unknown 10/11/2023    Methocarbamol Rash and Unknown 10/11/2023    Nickel Rash 10/11/2023    Sulfa (sulfonamide antibiotics) Nausea/vomiting 10/11/2023      Current Meds has a current medication list which includes the following prescription(s): acetaminophen, aspirin, atorvastatin, bupropion, cyclobenzaprine, dapagliflozin propanediol, docusate sodium, furosemide, gabapentin, gabapentin, golimumab, hydromorphone, leflunomide, levothyroxine, lisinopril, multivit-minerals/folic acid, naloxone, onetouch ultra test, potassium gluconate, and tresiba flextouch u-100, and the following Facility-Administered Medications: golimumab (Simponi Aria) 137.5 mg in sodium chloride 0.9% 100 mL IV.        Vitals:  Vitals:    10/17/23 0850   BP: 92/64   Pulse: 100   Resp: 19   Temp: 36.5 °C (97.7 °F)   SpO2: 96%   Weight: 69 kg (152 lb 3.2 oz)      Infusion Pre-procedure Checklist   Allergies reviewed: yes    Medications reviewed: yes   Contraindications to treatment: no   Previous reaction to current treatment:no   Current Health Issues: None   Pain: 6 [6]' Back [4]   Is the pain different from normal: no   Is the pain tolerable: yes   Is your Doctor aware: n/a   Contraindications based on patient history: no   Provider notified: Not applicable   Labs: None   Fall Risk Screening:      Review of Systems - Oncology   Negative for complaint: [] all other systems have been reviewed and are negative for complaint   Infusion Readiness:   Assessment Concerns Related to Infusion: No  Provider notified: n/a  Assess patient for the concerns below. Document provider notification as appropriate:  - Does not meet criteria to treat N/A  - Has an active or recent infection with/without current antibiotic use N/A  - Has recent/planned dental work N/A  - Has recent/planned surgeries N/A  - Has recently received or plans to receive vaccinations N/A  - Has treatment related toxicities N/A  - Is pregnant (unless noted otherwise) N/A    Initiated By: Gunjan Arias RN   Time: 9:08 AM     We administered golimumab (Simponi Aria) 137.5 mg in sodium chloride 0.9% 100 mL IV.

## 2023-10-25 NOTE — PROGRESS NOTES
Location of Pain:  Back/Neck   Pain medication prescribed by us: Hydromorphone 2 mg TID, Gabapentin 300 mg BID, Flexeril 10 mg BID  Pain medication prescribed by other provider: no  Any adverse effects from medication:   none  Average pain score with medication (0-10):       5-6  ave, 10 today   Percent effective: 70%  Do you take medicine exactly as prescribed? yes  Functional status (work, disability, retired, etc): Disability  Ability to manage activities of daily living: yes  Overall quality of family/social life with the current treatment (below average, average or above average): Average  ER visit since last office visit: 09/13/2023 Low Back/Hip Pain   New medical issues since last office visit: see above  Participating in (PT, Chiropractor, Tens Unit, Acupuncture, Aqua Therapy, Home Exercise): active    Last Controlled Substance Contract date: 05/17/23  Last toxicology date:       05/17/2023            Consistent with prescribed meds: yes  OARRS reviewed: yes  Daily MME:  30.00  Yearly Narcan prescribed: 2023

## 2023-10-26 NOTE — PROGRESS NOTES
Subjective   Patient ID: Nesha Mcleod is a 68 y.o. female.      Nesha follows up for interval reevaluation for chronic low back pain from lumbar post laminectomy syndrome and mid back pain from thoracic disc herniations.  Right hip pain from arthritis.  Does follow rheumatology and receives DMARD infusions with the last infusion being October 17, 2023.    Did go to the ED last office visit.  Initial September 5, 2023 for fall due to increased right-sided hip pain.  Reviewed CT imaging of the hip and is without any fractures or dislocations but is with arthritis.    Second ED visit September 7, 2023 at [facility] for the same condition.    Third ED visit September 13, 2023 at [facility].  At this visit was given tapered dose of oral prednisone.  Requesting this for me today as this did help to reduce pain and improve function up to 50% of low back and right hip pain.     Hydromorphone 2 MG up to 3 times daily as needed, gabapentin 300 MG to a total of twice daily due to renal impairment, once a day and Flexeril 10 MG twice a day as she needs reduces pain and improves function up to 70 %. Average pain score is 5 out of 10 with medication. Better able to manage ADLs with improved function since injection therapy in combination with medicinal therapy. Has an average quality family and social life with current condition and treatment.      Toxicology consistent May 17, 2023.  Annual controlled substance agreement and opioid risk tool are completed and scanned into the chart May 17, 2023.     Serena is with laminectomy changes at T11, L2, L3 and L5 levels is with a grade 1 anterolisthesis of L5 on S1. At the L2-L3 level laminectomy changes are also noted with scoliosis and disc degeneration. Is also with moderate facet joint arthropathy. The combination of these changes causes moderate to severe central canal stenosis, severe left lateral recess and moderate right lateral recess stenosis with severe left neural foramina  and moderate to severe right neuroforaminal narrowing. MRI was from April 20, 2021 ordered by primary care doctor from [facility].    Narrative & Impression   PROCEDURE:         HIP RT WO CONTRAST - ICT  3170  REASON FOR EXAM: Hip trauma, fracture suspected, xray done     RESULT: MRN: [id number]  Patient Name: LIZZIE PHILLIPS     STUDY:  HIP RT WO CONTRAST 9/5/2023 11:50 am     INDICATION:  Pain and fall     COMPARISON:  None available.     ACCESSION NUMBER(S):  [id number]     ORDERING CLINICIAN:  PATTI WASHINGTON     TECHNIQUE:  Unenhanced axial images through the right hip are performed with sagittal  and coronal reformations completed by the technologist at the acquisition  scanner.     FINDINGS:  There is no fracture or dislocation of the right hip. No fracture of the  right hemipelvis is observed. There is chondrocalcinosis of the right hip as  well as osteoarthritis. Small osteophytes project from the right femoral  head.     No intramuscular hematoma is seen.     There is vacuum disc at the L4-5 and L5-S1 levels with a grade 1  spondylolisthesis of L5 on S1 with unilateral spondylolysis on the left at  L5 identified. There is also considerable facet arthrosis bilaterally at  L4-5 and L5-S1 levels.     The urinary bladder is moderately distended.        IMPRESSION:  No fracture or dislocation of the right hip with no fracture of right  hemipelvis identified.     Grade 1 spondylolisthesis of L5 on S1 with pars defect on the left at L5.     Moderate distention of urinary bladder.     Mild osteoarthritis of the right hip with chondrocalcinosis.  Dictation workstation:   [id number]  This exam is available in DICOM format to non-affiliated healthcare  facilities on a secure media free searchable basis with prior patient  authorization.  The patient exposure is reported to a radiation dose index  registry.  All CT examinations are performed with one or more of the  following dose reduction techniques: Automated Exposure  Control, Adjustment  of mA and/or KV according to patient size, or use of iterative  reconstruction techniques.     Original Interpreting Physician:   LATOSHA FRANCOIS M.D.  Original Transcribed by/Date: CEDRIC Sep  5 2023 11:13A  Original Electronically Signed by/Date: LATOSHA FRANCOIS M.D. Sep  5 2023 12:04P     Narrative & Impression   PROCEDURE:         SPINE LUMBAR WO CONTRAST - CMR  2010  REASON FOR EXAM: SPINAL STENOSIS OF LUMBAR REGION WITH NEUROGENIC  CLAUDICATION     RESULT: MRN: [id number]  Patient Name: LIZZIE PHILLIPS     STUDY:  SPINE LUMBAR WO CONTRAST; 5/23/2023 1:16 pm     INDICATION:  SPINAL STENOSIS OF LUMBAR REGION WITH NEUROGENIC CLAUDICATION. 68-year-old  woman with chronic lower back pain and bilateral leg pain     COMPARISON:  Lumbar spine MRI 04/20/2021.     ACCESSION NUMBER(S):  [id number]     ORDERING CLINICIAN:  APRIL FLORES     TECHNIQUE:  Multiecho and multiplanar imaging of the lumbar spine was performed without  IV contrast.     FINDINGS:  Redemonstrated levoscoliosis of the lumbar spine. Interbody fusion of the L3  and L4 vertebral bodies again noted with laminectomy changes at L2, L3, and  L5 again seen.     Moderate to severe disc space narrowing is again noted at L1-L2, and L2-L3.        Alignment: There is grade 1 anterolisthesis of L5 on S1. There is grade 1  retrolisthesis of L2 on L3.        Bone marrow signal: Redemonstrated heterogeneous bone marrow signal, which  could be related to marrow reconversion process. Redemonstrated slightly  increased STIR signal within the L2 vertebral body, which may reflect  reactive bone marrow edema in the setting of degenerative change.        Conus: The conus medullaris terminates at the level of L1.        Paraspinal Soft tissues: Postsurgical changes in the posterior lumbar soft  tissues again noted.        Imaged Abdomen: Millimetric T2 hyperintense, T1 hypointense right and left  renal cysts are seen.        T12-L1: Moderate left paracentral disc bulge  again noted causing effacement  of the left side of the ventral thecal sac. No significant spinal canal or  neural foraminal narrowing.        L1-2: Mild disc bulge with facet arthropathy is again noted, resulting in  mild spinal canal narrowing. There is also mild bilateral neural foraminal  narrowing, more pronounced on the right.        L2-3: Laminectomy changes again noted. There is broad-based disc osteophyte  with facet hypertrophy resulting in severe spinal canal stenosis. Severe  bilateral neural foraminal narrowing is identified with likely contact and  impingement upon the exiting bilateral nerve roots.        L3-4: Redemonstrated interbody fusion/ankylosis of the L3-L4 vertebral  bodies with laminectomy changes noted. Mild facet joint arthropathy. No  significant spinal canal narrowing. Mild right neural foraminal narrowing.        L4-5: Spondylolisthesis with broad-based disc protrusion, facet hypertrophy,  and mild ligamentum flavum hypertrophy results in mild spinal canal  narrowing. There is also moderate right and mild-to-moderate left neural  foraminal narrowing.        L5-S1: Laminectomy changes again noted. No sign of spinal canal narrowing.  There is moderate facet joint hypertrophy. There is mild-to-moderate left  and mild right neural foraminal narrowing.     IMPRESSION:  1. Redemonstrated postsurgical and degenerative changes of the lumbar spine,  with multilevel neural foraminal narrowing and spinal canal narrowing.     2. At L2-L3, there is severe bilateral neural foraminal narrowing and severe  spinal canal stenosis.     3. Please see above for full description of findings at each individual disc  level.     4. Redemonstrated heterogeneous bone marrow signal, as can be seen with  marrow reconversion process. Please correlate with appropriate clinical and  laboratory data.        Dictation workstation:   [id number]     Original Interpreting Physician:   SHAQ SOLO MD  Original  Transcribed by/Date: MMODAL May 23 2023 12:15P  Original Electronically Signed by/Date: SHAQ SOLO MD May 23 2023  2:52P       Results/Data  Xray Cervical Spine 2 or 3 View 02Aug2022 08:15AM Shayy Foreman      Test Name Result Flag Reference   Xray Cervical Spine 2 or 3 View (Report)       FINAL REPORT  Interpreted by: DICKSON MCNEIL   08/03/22 11:15  MRN: [id number]  Patient Name: LIZZIE PHILLIPS     STUDY:  SPINE, CERVICAL, 2 OR 3 VIEWS; ; 8/2/2022 8:15 am     INDICATION:  CERVICAL PAIN M54.2: Cervical pain.     COMPARISON:  06/27/2022     ACCESSION NUMBER(S):  [id number]     ORDERING CLINICIAN:  SHAYY FOREMAN     FINDINGS:  Stable right central venous catheter. Postoperative changes C5-C6  ACDF. Atlantoaxial interval is maintained. Irregularity along the C2  odontoid base compatible better evaluated on the CT from 06/27/2022.  No fracture or traumatic malalignment. Mild multilevel degenerative  changes of the cervical spine. Precervical soft tissue planes are  maintained. Imaged lung fields are clear.     IMPRESSION:  No acute fracture or traumatic malalignment.  Stable CT odontoid irregularity likely secondary to history of  rheumatoid arthritis.  Stable postoperative changes C5-C6 ACDF.  Mild multilevel degenerative changes of the cervical spine.        Electronically signed by: EWA  08/03/22 11:15      Xray Cervical Spine 2 or 3 View 27Jun2022 11:35PM Massimo Skelton      Test Name Result Flag Reference   Xray Cervical Spine 2 or 3 View (Report)       FINAL REPORT  Interpreted by: LOBITO PIKE   06/28/22 00:04  MRN: [id number]  Patient Name: LIZZIE PHILLIPS     STUDY:  Cervical spine x-rays     INDICATION:  fx     COMPARISON:  X-ray cervical spine 08/24/2021  MRI cervical spine 05/28/2020     ACCESSION NUMBER(S):  [id number]     ORDERING CLINICIAN:  MASSIMO SKELTON     TECHNIQUE:  Four views of the cervical spine     FINDINGS:  Limited exam given nonvisualization of the lower cervical  spine on  lateral view.     Alignment: Minimal anterior subluxation of C3-4, degenerative.     Bones: Minimal multilevel loss of vertebral body height. ACDF of  C5-C6.     Disc spaces: Moderate spondylosis.     Soft tissues: Right-sided chest port. Shoulder arthroplasty.     IMPRESSION:  1. Limited exam.  2. No acute finding.     Electronically signed by: SHAHZAD  06/28/22 00:04      Xray Knee 1 or 2 View [id number] 03:30PM Toussaint, Karl      Test Name Result Flag Reference   Xray Knee 1 or 2 View (Report)       FINAL REPORT  Interpreted by: BRITTANY FAYE HANAUER, MD   02/14/22 15:52  MRN: [id number]  Patient Name: LIZZIE PHILLIPS     STUDY:  KNEE; 1 OR 2 VIEWS; 2/14/2022 3:30 pm     INDICATION:  Status post total knee replacement in pacu .     COMPARISON:  None     ACCESSION NUMBER(S):  [id number]     ORDERING CLINICIAN:  KARL TOUSSAINT     TECHNIQUE:  AP and lateral portable postoperative views of the right knee were  obtained.     FINDINGS:  Immediately status post right knee arthroplasty. Femoral and tibial  components appear well seated and in good alignment. Patellar  component is radiolucent. There is postsurgical soft tissue air and  also edema anteriorly in the soft tissues and in the suprapatellar  bursa. No lytic or blastic destructive bone lesion. No acute  fracture or dislocation.     IMPRESSION:  Immediately status post right knee arthroplasty. Postsurgical changes  as described. No acute fracture.     Electronically signed by: BRITTANY FAYE  02/14/22 15:52      MRI L Spine without Contrast 20Apr2021 09:05AM Non Ambulatory, Provider   Ordering Provider: MARIAMA LIN [id number]      Test Name Result Flag Reference   MRI L Spine without Contrast (Report)       Interpreted by: MARCIA PADILLA  04/20/21 10:12  MRN: [id number]  Patient Name: LIZZIE PHILLIPS     STUDY:  MRI L-SPINE WO; ; 4/20/2021 9:05 am     INDICATION:  M48.062 Spinal stenosis, lumbar region with neurogenic claudication.  M48.062 Spinal  stenosis, lumbar region with neurogenic claudication.     COMPARISON:  12/16/2020, 04/16/2020     ACCESSION NUMBER(S):  [id number]     ORDERING CLINICIAN:  MARIAMA LIN     TECHNIQUE:  Multiplanar, multisequence imaging of the lumbar spine was performed  without intravenous contrast administration.     FINDINGS:  Moderate levoscoliosis noted. Conus medullaris terminates at the  level of L1 vertebral body. Visualized portions of the conus and the  spinal cord shows normal signal intensity and caliber.     Heterogeneous bone marrow signal intensity noted, likely related to  osteopenia/osteoporosis. Ankylosis of the L3 and L4 vertebral bodies  noted. Laminectomy changes at T11, L2, L3 and L5 levels are again  noted. Grade 1 anterolisthesis of L5 on S1, trace retrolisthesis of  L1 on L2.     Again note is made of the prompting of the nerve roots at L4-L5 level.     At T11-T12 there is moderate left paracentral disc bulge noted  causing moderate indentation of the left side of the thecal sac with  mild flattening and posterior displacement of the cord. No cord  edema. Laminectomy changes noted. Asymmetric right-sided facet joint  arthropathy, ligamentum flavum hypertrophy indenting the  posterolateral aspect of the thecal sac, there is moderate central  spinal canal stenosis. Mild encroachment of the bilateral neural  foramina.     At L1-L2 mild disc bulge, facet joint arthropathy noted causing  minimal encroachment of the spinal canal. Mild encroachment of the  bilateral neural foramina noted.     At L1-L2 scoliosis, broad-based disc osteophyte, mild facet joint  arthropathy in combination noted causing mild-to-moderate central  spinal canal stenosis, and right neural foramina narrowing. Mild  encroachment of the left neural foramina.     At L2-L3 laminectomy changes noted, scoliosis, broad-based disc  osteophyte, moderate facet joint arthropathy in combination noted  causing moderate to severe central spinal canal,  severe left lateral  recess, moderate right lateral recess stenosis, severe left neural  foramina, moderate to severe right neural foramina narrowing.     At L3-L4 there is ankylosis of the L3-L4 vertebral bodies noted.  Laminectomy changes noted. Mild facet joint arthropathy. No  significant central spinal canal or neural foramina stenosis.     At L4-L5 broad-based central disc protrusion noted, moderate facet  joint arthropathy noted causing mild encroachment of the spinal  canal, mild right neuroforaminal and mild-to-moderate left neural  narrowing.     At L5-S1 again note is made of the laminectomy changes. Moderate  facet joint arthropathy, asymmetric on the right side indenting the  posterolateral aspect of the right side of the thecal sac. No  significant central spinal canal stenosis, mild encroachment of the  bilateral neural foramina, left greater than the right.     IMPRESSION:  Postoperative, multilevel degenerative spinal canal or neural  foramina stenosis as described above, predominantly in the lower  thoracic and upper lumbar spine, worse changes are noted at L2-L3.  There is no significant change from prior.        Electronically signed by: MARCIA PADILLA  04/20/21 10:12      MRI Cervical without Contrast [id number] 11:56AM Regine Le   [Jun 2, 2020 3:40PM Rosalio Webb]  AMA Intake Activity Log Entry by Rosalio Webb ([email or web address]) on 6/2/2020 3:37 PM  Status Change: To Confirmed - N/A   [Jun 2, 2020 3:40PM Rosalio Webb]  AMA Intake updated by Rosalio Webb ([email or web address]) on 6/2/2020 3:37 PM  New Recipient: Regine Le  New Appointment Date: May 28 2020 11:56AM   [May 27, 2020 12:33PM Regine Le]  Reason: Unspecified for MRI Cervical without Contrast      Test Name Result Flag Reference   MRI Cervical without Contrast (Report)       Interpreted by: IVETTE RAPHAEL  05/28/20 13:01  MRN: [id number]  Patient Name: LIZZIE PHILLIPS     STUDY:  MRI CERVICAL WO; 5/28/2020 11:56 am      INDICATION:  M96.1 Postlaminectomy syndrome, not elsewhere classified  M54.12  Radiculopathy, cervical region   neck pain.     COMPARISON:  March 4, 2015     ACCESSION NUMBER(S):  [id number]     ORDERING CLINICIAN:  ANH PENA     TECHNIQUE:  Sagittal T1, T2, STIR, axial T1 and axial T2 weighted images were  acquired through the cervical spine.     FINDINGS:  Alignment: There is new, grade 1 anterolisthesis of C3 on C4 and to a  lesser extent of C2 on C3. There is subtle, grade 1 anterolisthesis  of C7 on T1, T1 on T2, and T2 on T3.     Vertebrae/Intervertebral Discs: There is artifact due to anterior  fusion hardware at C5 and C6. The marrow signal is otherwise  inhomogeneous throughout on T1 and T2 weighted imaging and there are  multilevel degenerative endplate signal changes. There is new  ill-defined abnormal diminished signal on T1 and increased signal on  STIR imaging within the odontoid process and extending into the  superior aspect of the vertebral body. There are degenerative changes  of the craniocervical articulations and at the atlantodental interval.     Cord: The cervical cord is degraded by artifact particularly from  C4-5 through C6-7.     C1-C2: There is ligamentous thickening surrounding the odontoid.  There is no central canal stenosis.     C2-C3: There is no posterior disc contour abnormality. Left greater  than right degenerative facet changes do not produce significant  central canal or neural foraminal stenosis.     C3-C4: There is diffuse disc bulging, more pronounced than seen  previously. There has been marked interval progression of  degenerative facet arthropathy on the left. Abnormal signal is noted  within the left facet on STIR and T2 weighted imaging. There is also  left greater than right uncovertebral joint hypertrophy. Overall  there is severe left and mild right-sided neural foraminal stenosis.  There is no significant narrowing of the central canal.     C4-C5: Evaluation is  limited due to artifact from the surgical  hardware. Left greater than right degenerative facet arthropathy is  suspected. There is a posterior disc/osteophyte complex producing at  least partial effacement of the ventral subarachnoid space. There is  likely mild central canal stenosis and left greater than right neural  foraminal narrowing.     C5-C6: Evaluation is markedly limited due to artifact from the  surgical hardware.     C6-C7: Evaluation is markedly limited due to artifact from the  surgical hardware. There does appear to be mild disc bulging and  endplate spurring without appreciable central canal stenosis.     C7-T1: There is minimal disc bulging without central canal stenosis.  Facet and uncovertebral joint hypertrophy produce mild neural  foraminal stenosis.     T1-2: There is mild narrowing of the central canal due to disc  protrusion.     The sagittal images demonstrate disc protrusions at T2-3 and T3-4  with partial effacement of the ventral subarachnoid space.     The prevertebral and posterior paraspinous soft tissues are within  normal limits.     IMPRESSION:  1. Postoperative changes at C5 and C6. Associated artifact limits the  examination.  2. New abnormal signal within the odontoid process extending into the  superior aspect of the C2 vertebral body is nonspecific. A subacute  fracture could give this appearance. Alternatively, the signal  changes could be related to an erosive arthritis. CT of the cervical  spine may be of benefit for additional characterization.  3. Grade 1 anterolisthesis of C3 on C4 is new from 2015. Abnormal  signal within the left C3-4 facet is thought to most likely be  degenerative in etiology. An infectious or inflammatory arthritis  could also give this appearance, however.  4. Multilevel degenerative changes of the cervical and upper thoracic  spine.     An Orange alert message was sent to the referring physician Dr. ANH PENA through the [employer] system at 1:01  pm on 5/28/2020 by  Dr.Jennifer Muñoz     Electronically signed by: IVETTE MUÑOZ  05/28/20 13:01       Review of Systems   Constitutional: Negative.    Respiratory: Negative.     Cardiovascular: Negative.    Gastrointestinal: Negative.    Endocrine: Negative.    Genitourinary: Negative.    Musculoskeletal:  Positive for arthralgias, back pain, gait problem, joint swelling and myalgias.   Allergic/Immunologic: Negative.    Neurological:  Positive for weakness and numbness. Negative for dizziness, tremors, seizures, syncope, facial asymmetry, speech difficulty, light-headedness and headaches.   Hematological: Negative.    Psychiatric/Behavioral: Negative.         Objective   Physical Exam  Vitals and nursing note reviewed.   Constitutional:       Appearance: Normal appearance.   HENT:      Head: Normocephalic and atraumatic.   Cardiovascular:      Pulses: Normal pulses.   Pulmonary:      Effort: Pulmonary effort is normal. No respiratory distress.   Musculoskeletal:      Comments: Point tenderness over L4-L5 well-healed laminectomy scar and to the right of L4-L5.    Posture stooped.  Unable to stand erect due to pain and habitus.    Ambulates with guarded gait and rollator walker.   Skin:     General: Skin is warm and dry.      Capillary Refill: Capillary refill takes 2 to 3 seconds.   Neurological:      General: No focal deficit present.      Mental Status: She is alert and oriented to person, place, and time.      Cranial Nerves: No cranial nerve deficit.      Sensory: Sensory deficit present.      Motor: Weakness present.      Gait: Gait normal.      Comments: Allodynia to light touch L5 dermatomal distribution right posterior lateral hip and leg.    Weakness with resisted hip flexion, ankle dorsiflexion and plantarflexion 4 out of 5 both extremities.    Positive hip impingement to the right.    Negative straight leg raise.   Psychiatric:         Mood and Affect: Mood normal.         Behavior: Behavior normal.          Assessment/Plan   Problem List Items Addressed This Visit             ICD-10-CM    Neurogenic claudication due to lumbar spinal stenosis M48.062    Relevant Medications    predniSONE (Deltasone) 10 mg tablet    predniSONE (Deltasone) 10 mg tablet     Other Visit Diagnoses         Codes    Postlaminectomy syndrome, lumbar region    -  Primary M96.1    Relevant Medications    predniSONE (Deltasone) 10 mg tablet    predniSONE (Deltasone) 10 mg tablet    Spondylolisthesis of lumbar region     M43.16    Relevant Medications    predniSONE (Deltasone) 10 mg tablet    predniSONE (Deltasone) 10 mg tablet    Primary osteoarthritis of right hip     M16.11    Relevant Medications    predniSONE (Deltasone) 10 mg tablet    predniSONE (Deltasone) 10 mg tablet          Follow-up 12 weeks.    Reviewed and approved by JURGEN BEAVERS on 10/30/23 at 1:11 PM.

## 2023-10-30 ENCOUNTER — OFFICE VISIT (OUTPATIENT)
Dept: PAIN MEDICINE | Facility: CLINIC | Age: 68
End: 2023-10-30
Payer: MEDICARE

## 2023-10-30 VITALS
HEIGHT: 58 IN | HEART RATE: 87 BPM | RESPIRATION RATE: 16 BRPM | BODY MASS INDEX: 31.81 KG/M2 | SYSTOLIC BLOOD PRESSURE: 162 MMHG | DIASTOLIC BLOOD PRESSURE: 90 MMHG

## 2023-10-30 DIAGNOSIS — M48.061 SPINAL STENOSIS, LUMBAR REGION, WITHOUT NEUROGENIC CLAUDICATION: ICD-10-CM

## 2023-10-30 DIAGNOSIS — M96.1 POSTLAMINECTOMY SYNDROME, LUMBAR REGION: ICD-10-CM

## 2023-10-30 DIAGNOSIS — M48.062 NEUROGENIC CLAUDICATION DUE TO LUMBAR SPINAL STENOSIS: ICD-10-CM

## 2023-10-30 DIAGNOSIS — M79.18 CERVICAL MYOFASCIAL PAIN SYNDROME: ICD-10-CM

## 2023-10-30 DIAGNOSIS — M96.1 POSTLAMINECTOMY SYNDROME, LUMBAR REGION: Primary | ICD-10-CM

## 2023-10-30 DIAGNOSIS — M16.11 PRIMARY OSTEOARTHRITIS OF RIGHT HIP: ICD-10-CM

## 2023-10-30 DIAGNOSIS — M43.16 SPONDYLOLISTHESIS OF LUMBAR REGION: ICD-10-CM

## 2023-10-30 PROCEDURE — 3080F DIAST BP >= 90 MM HG: CPT | Performed by: NURSE PRACTITIONER

## 2023-10-30 PROCEDURE — 3044F HG A1C LEVEL LT 7.0%: CPT | Performed by: NURSE PRACTITIONER

## 2023-10-30 PROCEDURE — 4010F ACE/ARB THERAPY RXD/TAKEN: CPT | Performed by: NURSE PRACTITIONER

## 2023-10-30 PROCEDURE — 1159F MED LIST DOCD IN RCRD: CPT | Performed by: NURSE PRACTITIONER

## 2023-10-30 PROCEDURE — 1036F TOBACCO NON-USER: CPT | Performed by: NURSE PRACTITIONER

## 2023-10-30 PROCEDURE — 1160F RVW MEDS BY RX/DR IN RCRD: CPT | Performed by: NURSE PRACTITIONER

## 2023-10-30 PROCEDURE — 99213 OFFICE O/P EST LOW 20 MIN: CPT | Performed by: NURSE PRACTITIONER

## 2023-10-30 PROCEDURE — 3077F SYST BP >= 140 MM HG: CPT | Performed by: NURSE PRACTITIONER

## 2023-10-30 PROCEDURE — 1125F AMNT PAIN NOTED PAIN PRSNT: CPT | Performed by: NURSE PRACTITIONER

## 2023-10-30 RX ORDER — CYCLOBENZAPRINE HCL 10 MG
10 TABLET ORAL 2 TIMES DAILY PRN
Qty: 60 TABLET | Refills: 2 | Status: SHIPPED | OUTPATIENT
Start: 2023-10-30 | End: 2024-02-26 | Stop reason: SDUPTHER

## 2023-10-30 RX ORDER — HYDROMORPHONE HYDROCHLORIDE 2 MG/1
2 TABLET ORAL 3 TIMES DAILY PRN
Qty: 84 TABLET | Refills: 0 | Status: SHIPPED | OUTPATIENT
Start: 2023-11-07 | End: 2024-01-12 | Stop reason: WASHOUT

## 2023-10-30 RX ORDER — PREDNISONE 10 MG/1
10 TABLET ORAL DAILY
Qty: 30 TABLET | Refills: 0 | Status: SHIPPED | OUTPATIENT
Start: 2023-10-30 | End: 2023-10-30 | Stop reason: SDUPTHER

## 2023-10-30 RX ORDER — PREDNISONE 10 MG/1
10 TABLET ORAL DAILY
Qty: 30 TABLET | Refills: 0 | Status: SHIPPED | OUTPATIENT
Start: 2023-10-30 | End: 2023-11-10 | Stop reason: ALTCHOICE

## 2023-10-30 RX ORDER — GABAPENTIN 300 MG/1
300 CAPSULE ORAL 2 TIMES DAILY
Qty: 60 CAPSULE | Refills: 5 | Status: SHIPPED | OUTPATIENT
Start: 2023-10-30 | End: 2024-02-26 | Stop reason: SDUPTHER

## 2023-10-30 RX ORDER — PREDNISONE 10 MG/1
10 TABLET ORAL 2 TIMES DAILY
Qty: 10 TABLET | Refills: 0 | Status: SHIPPED | OUTPATIENT
Start: 2023-10-30 | End: 2023-11-10 | Stop reason: ALTCHOICE

## 2023-10-30 ASSESSMENT — ENCOUNTER SYMPTOMS
RESPIRATORY NEGATIVE: 1
ALLERGIC/IMMUNOLOGIC NEGATIVE: 1
DIZZINESS: 0
PSYCHIATRIC NEGATIVE: 1
NUMBNESS: 1
CARDIOVASCULAR NEGATIVE: 1
TREMORS: 0
MYALGIAS: 1
SEIZURES: 0
SPEECH DIFFICULTY: 0
LIGHT-HEADEDNESS: 0
WEAKNESS: 1
JOINT SWELLING: 1
FACIAL ASYMMETRY: 0
CONSTITUTIONAL NEGATIVE: 1
HEMATOLOGIC/LYMPHATIC NEGATIVE: 1
HEADACHES: 0
ENDOCRINE NEGATIVE: 1
ARTHRALGIAS: 1
BACK PAIN: 1
GASTROINTESTINAL NEGATIVE: 1

## 2023-10-30 ASSESSMENT — PAIN SCALES - GENERAL: PAINLEVEL: 10-WORST PAIN EVER

## 2023-10-31 DIAGNOSIS — M96.1 POSTLAMINECTOMY SYNDROME, NOT ELSEWHERE CLASSIFIED: Primary | ICD-10-CM

## 2023-10-31 RX ORDER — HYDROMORPHONE HYDROCHLORIDE 2 MG/1
2 TABLET ORAL 3 TIMES DAILY PRN
Qty: 84 TABLET | Refills: 0 | Status: SHIPPED | OUTPATIENT
Start: 2024-01-02 | End: 2024-02-26 | Stop reason: SDUPTHER

## 2023-10-31 RX ORDER — HYDROMORPHONE HYDROCHLORIDE 2 MG/1
2 TABLET ORAL 3 TIMES DAILY PRN
Qty: 84 TABLET | Refills: 0 | Status: SHIPPED | OUTPATIENT
Start: 2023-12-05 | End: 2023-11-10 | Stop reason: WASHOUT

## 2023-11-08 PROBLEM — N32.81 OVERACTIVE BLADDER: Status: ACTIVE | Noted: 2023-05-25

## 2023-11-08 PROBLEM — F41.9 ANXIETY: Status: ACTIVE | Noted: 2023-05-25

## 2023-11-08 PROBLEM — L40.50 PSORIATIC ARTHRITIS (MULTI): Status: ACTIVE | Noted: 2023-05-25

## 2023-11-09 ENCOUNTER — APPOINTMENT (OUTPATIENT)
Dept: PRIMARY CARE | Facility: CLINIC | Age: 68
End: 2023-11-09
Payer: MEDICARE

## 2023-11-09 ENCOUNTER — TELEPHONE (OUTPATIENT)
Dept: PRIMARY CARE | Facility: CLINIC | Age: 68
End: 2023-11-09

## 2023-11-10 ENCOUNTER — OFFICE VISIT (OUTPATIENT)
Dept: PRIMARY CARE | Facility: CLINIC | Age: 68
End: 2023-11-10
Payer: MEDICARE

## 2023-11-10 VITALS
SYSTOLIC BLOOD PRESSURE: 112 MMHG | OXYGEN SATURATION: 95 % | DIASTOLIC BLOOD PRESSURE: 74 MMHG | HEART RATE: 87 BPM | TEMPERATURE: 98.4 F

## 2023-11-10 DIAGNOSIS — K59.00 CONSTIPATION, UNSPECIFIED CONSTIPATION TYPE: ICD-10-CM

## 2023-11-10 DIAGNOSIS — G89.29 CHRONIC INTRACTABLE PAIN: ICD-10-CM

## 2023-11-10 DIAGNOSIS — R26.2 DIFFICULTY WALKING: Primary | ICD-10-CM

## 2023-11-10 DIAGNOSIS — E11.9 TYPE 2 DIABETES MELLITUS WITHOUT COMPLICATION, WITH LONG-TERM CURRENT USE OF INSULIN (MULTI): ICD-10-CM

## 2023-11-10 DIAGNOSIS — I10 ESSENTIAL (PRIMARY) HYPERTENSION: ICD-10-CM

## 2023-11-10 DIAGNOSIS — F41.9 ANXIETY: ICD-10-CM

## 2023-11-10 DIAGNOSIS — Z79.4 TYPE 2 DIABETES MELLITUS WITHOUT COMPLICATION, WITH LONG-TERM CURRENT USE OF INSULIN (MULTI): ICD-10-CM

## 2023-11-10 PROCEDURE — 1159F MED LIST DOCD IN RCRD: CPT | Performed by: NURSE PRACTITIONER

## 2023-11-10 PROCEDURE — 99349 HOME/RES VST EST MOD MDM 40: CPT | Performed by: NURSE PRACTITIONER

## 2023-11-10 PROCEDURE — 1036F TOBACCO NON-USER: CPT | Performed by: NURSE PRACTITIONER

## 2023-11-10 PROCEDURE — 4010F ACE/ARB THERAPY RXD/TAKEN: CPT | Performed by: NURSE PRACTITIONER

## 2023-11-10 PROCEDURE — 1160F RVW MEDS BY RX/DR IN RCRD: CPT | Performed by: NURSE PRACTITIONER

## 2023-11-10 PROCEDURE — 3078F DIAST BP <80 MM HG: CPT | Performed by: NURSE PRACTITIONER

## 2023-11-10 PROCEDURE — 1125F AMNT PAIN NOTED PAIN PRSNT: CPT | Performed by: NURSE PRACTITIONER

## 2023-11-10 PROCEDURE — 3044F HG A1C LEVEL LT 7.0%: CPT | Performed by: NURSE PRACTITIONER

## 2023-11-10 PROCEDURE — 3074F SYST BP LT 130 MM HG: CPT | Performed by: NURSE PRACTITIONER

## 2023-11-10 RX ORDER — BUPROPION HYDROCHLORIDE 100 MG/1
50 TABLET ORAL DAILY
Qty: 45 TABLET | Refills: 3 | Status: SHIPPED | OUTPATIENT
Start: 2023-11-10 | End: 2023-11-10 | Stop reason: DRUGHIGH

## 2023-11-10 RX ORDER — BUPROPION HYDROCHLORIDE 100 MG/1
100 TABLET, EXTENDED RELEASE ORAL DAILY
Qty: 90 TABLET | Refills: 3 | Status: SHIPPED | OUTPATIENT
Start: 2023-11-10 | End: 2024-11-09

## 2023-11-10 ASSESSMENT — ENCOUNTER SYMPTOMS
RESPIRATORY NEGATIVE: 1
MYALGIAS: 1
FATIGUE: 1
EYES NEGATIVE: 1
DIARRHEA: 1
CONSTIPATION: 1
BACK PAIN: 1
CARDIOVASCULAR NEGATIVE: 1
HEMATOLOGIC/LYMPHATIC NEGATIVE: 1
ARTHRALGIAS: 1
ALLERGIC/IMMUNOLOGIC NEGATIVE: 1
PSYCHIATRIC NEGATIVE: 1

## 2023-11-10 NOTE — PROGRESS NOTES
Cardiology Subjective   Patient ID: Nesha Mcleod is a 68 y.o. female who is being seen for 4 weef follow up. .    HPI Pt seen in alone in private apartment. For 4 week follow up. PMHx: HTN, DM, HLD, constipation, anxiety, OA, chronic intractable pain. Pt seen sitting in chair with feet dependent. Pt with c/o back and neck at present. Pt saw pain management Oct 17th where she was given a round of Prednisone that greatly helped her pain. She will be getting her Simponi infusion next week and then it will be every 8 weeks. Pt with c/o recent fall a week and a half ago in the bathroom., was not near the pull cord. Pt used a towel to scoot and eventually was able to pull herself up. She did not seek medical care. Pt states she and bruising to her left side. Pt denies need for PT at present states they have made her worse in the past. Pt ambulates with rollator. Pt sleeping in own bed and hospital bed gone form living room. Pts blood sugars have been running in the 170's lately. States appetite is too good. Not following a diabetic diet. Pt reports blood sugars in upper 200's while on steroids. Pt reports she is taking her tresiba daily now. Pt denies fevers, chills, night sweats, headaches or dizziness. Pt denies SOB, cough, CP or palpiations Denies c/o N/V/D, pt with complaints of constipation and bowels not totally emptying when she goes, states has has to return to the bathroom many times to wipe stool. Denies dysuria or frequency. States sleep is good. Pt states mood is down and that she feels down in the dumps. She reports being depressed that she can't go out and do the things she wants to do. Pt doesn't;t feel as her Wellbutrin is helping enough. Also reports feelings of anxiety wit difficulty leaving the houseDenies feeling of harming self or others.  Home Visit medically necessary due to: pt has chronic condition that makes access to a traditional office visit very difficult, illness or condition that results in activity  limitation or restriction that impacts the ability to leave home such as; unsteady gait/ poor condition.        Review of Systems   Constitutional:  Positive for fatigue.   HENT: Negative.     Eyes: Negative.    Respiratory: Negative.     Cardiovascular: Negative.    Gastrointestinal:  Positive for constipation and diarrhea.   Endocrine: Positive for cold intolerance.   Genitourinary: Negative.    Musculoskeletal:  Positive for arthralgias, back pain, gait problem and myalgias.   Skin: Negative.    Allergic/Immunologic: Negative.    Hematological: Negative.    Psychiatric/Behavioral: Negative.         Objective   /74 (BP Location: Right arm, Patient Position: Sitting)   Pulse 87   Temp 36.9 °C (98.4 °F) (Temporal)   SpO2 95%       Current Outpatient Medications:     acetaminophen (Tylenol) 325 mg tablet, Take 2 tablets (650 mg) by mouth every 4 hours if needed for mild pain (1 - 3), headaches or fever (temp greater than 38.0 C). THIS ORDER IS SUPERSEDED BY ANY OTHER MEDICATION ORDER FOR MILD PAIN (1-3). MAY BE USED FOR A HIGHER PAIN SCORE IF PATIENT REQUESTS, Disp: , Rfl:     aspirin 81 mg chewable tablet, Chew 1 tablet (81 mg) once daily. HOLD IF GIVEN IN LAST 24 HOURS OR IF HISTORY OF HYPERSENSITIVITY., Disp: , Rfl:     atorvastatin (Lipitor) 40 mg tablet, Take 1 tablet (40 mg) by mouth once daily., Disp: 90 tablet, Rfl: 2    buPROPion SR (Wellbutrin SR) 100 mg 12 hr tablet, Take 1 tablet (100 mg) by mouth once daily. Do not crush, chew, or split., Disp: 90 tablet, Rfl: 3    cyclobenzaprine (Flexeril) 10 mg tablet, Take 1 tablet (10 mg) by mouth 2 times a day as needed for muscle spasms., Disp: 60 tablet, Rfl: 2    dapagliflozin propanediol (Farxiga) 10 mg, Take 1 tablet (10 mg) by mouth once daily., Disp: 90 tablet, Rfl: 3    docusate sodium (Dulcolax Stool Softener, dss,) 100 mg capsule, Take 1 capsule (100 mg) by mouth 2 times a day as needed., Disp: , Rfl:     furosemide (Lasix) 40 mg tablet, Take 1  tablet (40 mg) by mouth once daily., Disp: 90 tablet, Rfl: 3    gabapentin (Neurontin) 300 mg capsule, Take 3 capsules (900 mg) by mouth once daily at bedtime., Disp: , Rfl:     gabapentin (Neurontin) 300 mg capsule, Take 1 capsule (300 mg) by mouth 2 times a day., Disp: 60 capsule, Rfl: 5    golimumab (Simponi) 50 mg/0.5 mL pen injector, Inject under the skin once daily., Disp: , Rfl:     HYDROmorphone (Dilaudid) 2 mg tablet, Take 1 tablet (2 mg) by mouth 3 times a day as needed for severe pain (7 - 10). Do not start before November 7, 2023., Disp: 84 tablet, Rfl: 0    [START ON 1/2/2024] HYDROmorphone (Dilaudid) 2 mg tablet, Take 1 tablet (2 mg) by mouth 3 times a day as needed for moderate pain (4 - 6). Do not start before January 2, 2024., Disp: 84 tablet, Rfl: 0    leflunomide (Arava) 10 mg tablet, Take 1 tablet (10 mg) by mouth once daily., Disp: 90 tablet, Rfl: 3    levothyroxine (Synthroid, Levoxyl) 75 mcg tablet, Take 1 tablet (75 mcg) by mouth once daily in the morning. Take before meals., Disp: 90 tablet, Rfl: 3    lisinopril 20 mg tablet, Take 1 tablet (20 mg) by mouth once daily., Disp: 90 tablet, Rfl: 3    multivit-minerals/folic acid (CENTRUM ADULTS ORAL), Take by mouth once daily., Disp: , Rfl:     naloxone (Narcan) 4 mg/0.1 mL nasal spray, USE AS DIRECTED, Disp: , Rfl:     OneTouch Ultra Test strip, once daily., Disp: , Rfl:     potassium gluconate 595 mg (99 mg) tablet, Take 1 tablet by mouth once daily., Disp: , Rfl:     Tresiba FlexTouch U-100 100 unit/mL (3 mL) injection, Inject 8 Units under the skin once daily in the morning., Disp: , Rfl:       Physical Exam  Constitutional:       Appearance: She is normal weight.   HENT:      Head: Normocephalic.      Nose: Nose normal.      Mouth/Throat:      Mouth: Mucous membranes are moist.   Eyes:      Pupils: Pupils are equal, round, and reactive to light.   Cardiovascular:      Rate and Rhythm: Normal rate and regular rhythm.      Pulses: Normal  pulses.      Heart sounds: Normal heart sounds.   Pulmonary:      Effort: Pulmonary effort is normal.      Breath sounds: Decreased air movement present. Examination of the right-middle field reveals decreased breath sounds. Examination of the left-middle field reveals decreased breath sounds. Examination of the right-lower field reveals decreased breath sounds. Examination of the left-lower field reveals decreased breath sounds. Decreased breath sounds present.   Abdominal:      General: Bowel sounds are normal.      Palpations: Abdomen is soft.   Musculoskeletal:         General: Tenderness present.      Cervical back: Normal range of motion and neck supple.   Skin:     General: Skin is warm and dry.      Capillary Refill: Capillary refill takes 2 to 3 seconds.   Neurological:      General: No focal deficit present.      Mental Status: She is alert and oriented to person, place, and time.   Psychiatric:         Mood and Affect: Mood normal.         Behavior: Behavior normal.         Thought Content: Thought content normal.         Judgment: Judgment normal.       Patient Active Problem List   Diagnosis    Psoriatic arthritis (CMS/HCC)    Overactive bladder    Myalgia    Soft tissue mass    Neurogenic claudication due to lumbar spinal stenosis    Acquired partial lipodystrophy    Acute cystitis without hematuria    Acute renal failure syndrome (CMS/HCC)    ALT (SGPT) level raised    Altered mental status    Anal sphincter incontinence    Anxiety    Arthritis    Arthritis of neck    Autoimmune thyroiditis    Blepharitis    Blurred vision, bilateral    Cataract, nuclear sclerotic, both eyes    Cellulitis    Cervical radicular pain    Pain, radicular, lumbar    Choroidal nevus, left eye    Chronic intractable pain    Chronic low back pain    Clouded consciousness    Colovaginal fistula    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    Consecutive exotropia    Change in bowel habits    Constipation    Contact dermatitis     Controlled insulin dependent type 1 diabetes mellitus (CMS/HCC)    Type 2 diabetes mellitus (CMS/HCC)    DDD (degenerative disc disease), thoracic    Dermatochalasis    Difficulty walking    Disorder of refraction    Disturbance, visual, subjective    Diverticulosis of colon    Dry eyes    Early cataract    Elevated creatine kinase    Essential (primary) hypertension    Fecal soiling due to fecal incontinence    Fistula involving female genital tract    Fracture of cervical vertebra (CMS/HCC)    Gastroesophageal reflux disease    Generalized osteoarthritis of multiple sites    Hemorrhoids without complication    Iron deficiency anemia    Leukopenia    Lumbar radiculitis    Mixed hyperlipidemia    Myofascial pain    Numbness    Obesity with body mass index 30 or greater    Palsy of conjugate gaze    Poor venous access    Incisional hernia    Postlaminectomy syndrome, lumbar region    Pseudophakia of both eyes    Psoriasis    Recurrent urinary tract infection    Retinopathy, background, nonproliferative, mild    Skin excoriation    Swelling of upper arm    Vaginal disorder    Weakness generalized     Preventative Medicine Counseling: Medication Education: Education: Current medication list reviewed and discussed, understanding, patient expressed understanding, adherence: No barriers to adherence identified.      Assessment/Plan   Diagnoses and all orders for this visit:  Difficulty walking  Comments:  continue ambulaitng [facility] rollator  Anxiety  Comments:  Continue Buproprion, increase to 100mg po daily  Orders:  -     buPROPion SR (Wellbutrin SR) 100 mg 12 hr tablet; Take 1 tablet (100 mg) by mouth once daily. Do not crush, chew, or split.  Constipation, unspecified constipation type  Comments:  Begin miralax, discussed adjusting dose based on bowel movements  Essential (primary) hypertension  Comments:  Continue lisinopril  Type 2 diabetes mellitus without complication, with long-term current use of insulin  (CMS/[facility])  Comments:  Continue blood sugars daily  Continue Tresiba and Farxiga  Diabetic diet  Chronic intractable pain  Comments:  Coninue medicaiton per pain management and follow ups     Angela Dyer, APRN-CNP

## 2023-11-14 ENCOUNTER — INFUSION (OUTPATIENT)
Dept: INFUSION THERAPY | Facility: CLINIC | Age: 68
End: 2023-11-14
Payer: MEDICARE

## 2023-11-14 VITALS
BODY MASS INDEX: 31.77 KG/M2 | DIASTOLIC BLOOD PRESSURE: 64 MMHG | OXYGEN SATURATION: 98 % | WEIGHT: 152 LBS | HEART RATE: 100 BPM | SYSTOLIC BLOOD PRESSURE: 91 MMHG | TEMPERATURE: 98.6 F | RESPIRATION RATE: 18 BRPM

## 2023-11-14 DIAGNOSIS — L40.50 PSORIATIC ARTHRITIS (MULTI): ICD-10-CM

## 2023-11-14 DIAGNOSIS — L40.50 PSORIATIC ARTHRITIS (MULTI): Primary | ICD-10-CM

## 2023-11-14 PROCEDURE — 96365 THER/PROPH/DIAG IV INF INIT: CPT | Mod: INF | Performed by: INTERNAL MEDICINE

## 2023-11-14 PROCEDURE — 2500000004 HC RX 250 GENERAL PHARMACY W/ HCPCS (ALT 636 FOR OP/ED): Performed by: INTERNAL MEDICINE

## 2023-11-14 RX ORDER — ALBUTEROL SULFATE 0.83 MG/ML
3 SOLUTION RESPIRATORY (INHALATION) AS NEEDED
Status: CANCELLED | OUTPATIENT
Start: 2023-12-12

## 2023-11-14 RX ORDER — FAMOTIDINE 10 MG/ML
20 INJECTION INTRAVENOUS ONCE AS NEEDED
Status: CANCELLED | OUTPATIENT
Start: 2023-11-14

## 2023-11-14 RX ORDER — DIPHENHYDRAMINE HYDROCHLORIDE 50 MG/ML
50 INJECTION INTRAMUSCULAR; INTRAVENOUS AS NEEDED
Status: CANCELLED | OUTPATIENT
Start: 2023-11-14

## 2023-11-14 RX ORDER — DIPHENHYDRAMINE HYDROCHLORIDE 50 MG/ML
50 INJECTION INTRAMUSCULAR; INTRAVENOUS AS NEEDED
Status: CANCELLED | OUTPATIENT
Start: 2023-12-12

## 2023-11-14 RX ORDER — FAMOTIDINE 10 MG/ML
20 INJECTION INTRAVENOUS ONCE AS NEEDED
Status: CANCELLED | OUTPATIENT
Start: 2023-12-12

## 2023-11-14 RX ORDER — EPINEPHRINE 0.3 MG/.3ML
0.3 INJECTION SUBCUTANEOUS EVERY 5 MIN PRN
Status: CANCELLED | OUTPATIENT
Start: 2023-12-12

## 2023-11-14 RX ORDER — EPINEPHRINE 0.3 MG/.3ML
0.3 INJECTION SUBCUTANEOUS EVERY 5 MIN PRN
Status: CANCELLED | OUTPATIENT
Start: 2023-11-14

## 2023-11-14 RX ORDER — ALBUTEROL SULFATE 0.83 MG/ML
3 SOLUTION RESPIRATORY (INHALATION) AS NEEDED
Status: CANCELLED | OUTPATIENT
Start: 2023-11-14

## 2023-11-14 RX ADMIN — GOLIMUMAB 137.5 MG: 50 SOLUTION INTRAVENOUS at 09:07

## 2023-11-14 NOTE — PROGRESS NOTES
[facility]   infusion Clinic Note   Date: 2023   Name: Nesha Mcleod  : 1955   MRN: [id number]         Reason for Visit:   OP Infusion (Golimumab)      Accompanied by:Self   Visit Type:: Infusion   Diagnosis: Psoriatic arthritis (CMS/[facility])    Allergies:   Allergies as of 2023 - Reviewed 10/30/2023   Allergen Reaction Noted    Citalopram Other 10/11/2023    Methotrexate Swelling 10/11/2023    Trazodone Other 10/11/2023    Adhesive tape-silicones Hives 10/11/2023    Bleach (sodium hypochlorite) Hives, Unknown, and Itching 2016    Chlorine Unknown and Other 10/11/2023    Ciprofloxacin Other and Unknown 2011    Codeine Hives and Rash 2011    Doxycycline Nausea And Vomiting and Unknown 10/11/2023    Latex Hives and Unknown 2011    Morphine Hives and Rash 2011    Penicillins Hives 10/11/2023    Soap Unknown and Hives 10/11/2023    Tetracycline Hives 10/11/2023    Amitriptyline Other 2011    Grass pollen Itching 2016    Infliximab Unknown 10/11/2023    Other Hives and Unknown 10/11/2023    Rubber, unspecified Unknown 10/11/2023    Trace metals Unknown 10/11/2023    Weed pollen Itching 2016    Methocarbamol Rash and Unknown 10/11/2023    Nickel Rash 10/11/2023    Sulfa (sulfonamide antibiotics) Nausea/vomiting and GI Upset 2011      Current Meds has a current medication list which includes the following prescription(s): acetaminophen, aspirin, atorvastatin, bupropion sr, cyclobenzaprine, dapagliflozin propanediol, docusate sodium, furosemide, gabapentin, gabapentin, golimumab, hydromorphone, [START ON 2024] hydromorphone, leflunomide, levothyroxine, lisinopril, multivit-minerals/folic acid, naloxone, onetouch ultra test, potassium gluconate, and tresiba flextouch u-100, and the following Facility-Administered Medications: golimumab (Simponi Aria) 137.5 mg in sodium chloride 0.9% 100 mL IV.        Vitals:  Vitals:     11/14/23 0857   BP: 90/58   Pulse: 69   Resp: 18   Temp: 37 °C (98.6 °F)   SpO2: 96%   Weight: 68.9 kg (152 lb)      Infusion Pre-procedure Checklist   Allergies reviewed: yes   Medications reviewed: yes   Contraindications to treatment: no   Previous reaction to current treatment:no   Current Health Issues: None   Pain: '    Is the pain different from normal: no   Is the pain tolerable: yes   Is your Doctor aware: n/a   Contraindications based on patient history: no   Provider notified: Not applicable   Labs: None   Fall Risk Screening:      Review of Systems - Oncology   Negative for complaint: [] all other systems have been reviewed and are negative for complaint   Infusion Readiness:   Assessment Concerns Related to Infusion: No  Provider notified: n/a  Assess patient for the concerns below. Document provider notification as appropriate:  - Does not meet criteria to treat N/A  - Has an active or recent infection with/without current antibiotic use N/A  - Has recent/planned dental work N/A  - Has recent/planned surgeries N/A  - Has recently received or plans to receive vaccinations N/A  - Has treatment related toxicities N/A  - Is pregnant (unless noted otherwise) N/A    Initiated By: Gunjan Arias RN   Time: 9:11 AM     We administered golimumab (Simponi Aria) 137.5 mg in sodium chloride 0.9% 100 mL IV.

## 2023-11-14 NOTE — PATIENT INSTRUCTIONS
Today you received: ( free text drug name and dose, including premedication's)  Golimumab    For:   1. Psoriatic arthritis (CMS/[facility])          Please read the  Medication Guide that was given to you and reviewed during todays visit.     (Tell all doctors including dentists that you are taking this medication)     Go to the emergency room or call 911 if:  -You have signs of allergic reaction:   o         Rash, hives, itching.   o         Swollen, blistered, peeling skin.   o         Swelling of face, lips, mouth, tongue or throat.   o         Tightness of chest, trouble breathing, swallowing or talking      Call your doctor:     - If IV / injection site gets red, warm, swollen, itchy or leaks fluid or pus.     (Leave dressing on your IV site for at least 2 hours and keep area clean and dry  - If you get sick or have symptoms of infection or are not feeling well for any reason.    (Wash your hands often, stay away from people who are sick)  - If you have side effects from your medication that do not go away or are bothersome.     (Refer to the teaching your nurse gave you for side effects to call your doctor about)     Common side effects may include:  stuffy nose, headache, feeling tired, muscle aches, upset stomach  - Before receiving any vaccines, Call [facility] at   if:  - You get sick, are on antibiotics, have had a recent vaccine, have surgery or dental work and your doctor wants your visit rescheduled.  - You need to cancel and reschedule your visit for any reason. Call at least 2 days before your visit if you need to cancel.   - Your insurance changes before your next visit.    (We will need to get approval from your new insurance. This can take up to two weeks.)     The [facility] is opened Monday thru Friday. We are closed on weekends and holidays.     Voice mail will take your call if the center is closed. If you leave a message please allow 24 hours for a call back  during weekdays. If you leave a message on a weekend/holiday, we will call you back the next business day.

## 2023-12-11 ENCOUNTER — TELEPHONE (OUTPATIENT)
Dept: PRIMARY CARE | Facility: CLINIC | Age: 68
End: 2023-12-11
Payer: MEDICARE

## 2023-12-11 DIAGNOSIS — N30.00 ACUTE CYSTITIS WITHOUT HEMATURIA: Primary | ICD-10-CM

## 2023-12-11 RX ORDER — NITROFURANTOIN 25; 75 MG/1; MG/1
100 CAPSULE ORAL 2 TIMES DAILY
Qty: 10 CAPSULE | Refills: 0 | Status: SHIPPED | OUTPATIENT
Start: 2023-12-11 | End: 2023-12-16

## 2023-12-11 NOTE — TELEPHONE ENCOUNTER
Pt feels she as a UTI. Pt states she has odor to her urine and increased frequency with little output. Pt states she is unable to get sample to lab.

## 2023-12-18 ENCOUNTER — TELEPHONE (OUTPATIENT)
Dept: PRIMARY CARE | Facility: CLINIC | Age: 68
End: 2023-12-18
Payer: MEDICARE

## 2023-12-18 DIAGNOSIS — N30.00 ACUTE CYSTITIS WITHOUT HEMATURIA: Primary | ICD-10-CM

## 2023-12-18 RX ORDER — NITROFURANTOIN 25; 75 MG/1; MG/1
100 CAPSULE ORAL 2 TIMES DAILY
Qty: 14 CAPSULE | Refills: 0 | Status: SHIPPED | OUTPATIENT
Start: 2023-12-18 | End: 2023-12-25

## 2023-12-18 NOTE — TELEPHONE ENCOUNTER
Call placed to patient number as listed, no  answer, no option to leave message.  Will attempt again.

## 2023-12-18 NOTE — TELEPHONE ENCOUNTER
Patient calls to state she continues to have urinary frequency although she finished her antibiotics on Friday, feels like she still has UTI.  Please Advise.

## 2024-01-09 ENCOUNTER — INFUSION (OUTPATIENT)
Dept: INFUSION THERAPY | Facility: CLINIC | Age: 69
End: 2024-01-09
Payer: MEDICARE

## 2024-01-09 VITALS
DIASTOLIC BLOOD PRESSURE: 77 MMHG | SYSTOLIC BLOOD PRESSURE: 117 MMHG | HEART RATE: 91 BPM | OXYGEN SATURATION: 98 % | RESPIRATION RATE: 18 BRPM | TEMPERATURE: 97.3 F

## 2024-01-09 DIAGNOSIS — L40.50 PSORIATIC ARTHRITIS (MULTI): ICD-10-CM

## 2024-01-09 PROCEDURE — 2500000004 HC RX 250 GENERAL PHARMACY W/ HCPCS (ALT 636 FOR OP/ED): Mod: JZ | Performed by: INTERNAL MEDICINE

## 2024-01-09 PROCEDURE — 96365 THER/PROPH/DIAG IV INF INIT: CPT | Mod: INF | Performed by: INTERNAL MEDICINE

## 2024-01-09 RX ORDER — FAMOTIDINE 10 MG/ML
20 INJECTION INTRAVENOUS ONCE AS NEEDED
Status: CANCELLED | OUTPATIENT
Start: 2024-02-06

## 2024-01-09 RX ORDER — EPINEPHRINE 0.3 MG/.3ML
0.3 INJECTION SUBCUTANEOUS EVERY 5 MIN PRN
Status: CANCELLED | OUTPATIENT
Start: 2024-02-06

## 2024-01-09 RX ORDER — DIPHENHYDRAMINE HYDROCHLORIDE 50 MG/ML
50 INJECTION INTRAMUSCULAR; INTRAVENOUS AS NEEDED
Status: CANCELLED | OUTPATIENT
Start: 2024-02-06

## 2024-01-09 RX ORDER — ALBUTEROL SULFATE 0.83 MG/ML
3 SOLUTION RESPIRATORY (INHALATION) AS NEEDED
Status: CANCELLED | OUTPATIENT
Start: 2024-02-06

## 2024-01-09 RX ADMIN — GOLIMUMAB 137.5 MG: 50 SOLUTION INTRAVENOUS at 09:18

## 2024-01-09 NOTE — PATIENT INSTRUCTIONS
Today :We administered golimumab (Simponi Aria) 137.5 mg in sodium chloride 0.9% 100 mL IV.     For:   1. Psoriatic arthritis (CMS/[facility])         Your next appointment is due in:  March 5/ 2024        Please read the  Medication Guide that was given to you and reviewed during todays visit.     (Tell all doctors including dentists that you are taking this medication)     Go to the emergency room or call 911 if:  -You have signs of allergic reaction:   -Rash, hives, itching.   -Swollen, blistered, peeling skin.   -Swelling of face, lips, mouth, tongue or throat.   -Tightness of chest, trouble breathing, swallowing or talking     Call your doctor:  - If IV / injection site gets red, warm, swollen, itchy or leaks fluid or pus.     (Leave dressing on your IV site for at least 2 hours and keep area clean and dry  - If you get sick or have symptoms of infection or are not feeling well for any reason.    (Wash your hands often, stay away from people who are sick)  - If you have side effects from your medication that do not go away or are bothersome.     (Refer to the teaching your nurse gave you for side effects to call your doctor about)    - Common side effects may include:  stuffy nose, headache, feeling tired, muscle aches, upset stomach  - Before receiving any vaccines     - Call the [facility] at   If:  - You get sick, are on antibiotics, have had a recent vaccine, have surgery or dental work and your doctor wants your visit rescheduled.  - You need to cancel and reschedule your visit for any reason. Call at least 2 days before your visit if you need to cancel.   - Your insurance changes before your next visit.    (We will need to get approval from your new insurance. This can take up to two weeks.)     [facility] is opened Monday thru Friday. We are closed on weekends and holidays.   Voice mail will take your call if the center is closed. If you leave a message please allow 24  hours for a call back during weekdays. If you leave a message on a weekend/holiday, we will call you back the next business day.

## 2024-01-09 NOTE — PROGRESS NOTES
[facility]   infusion Clinic Note   Date: 2024   Name: Nesha Mcleod  : 1955   MRN: [id number]         Reason for Visit: OP Infusion (Golimumb)         Visit Type: INFUSION      Ordered By: Lucio      Accompanied by:Self      Diagnosis: Psoriatic arthritis (CMS/[facility])       Allergies:   Allergies as of 2024 - Reviewed 10/30/2023   Allergen Reaction Noted    Citalopram Other 10/11/2023    Methotrexate Swelling 10/11/2023    Trazodone Other 10/11/2023    Adhesive tape-silicones Hives 10/11/2023    Bleach (sodium hypochlorite) Hives, Unknown, and Itching 2016    Chlorine Unknown and Other 10/11/2023    Ciprofloxacin Other and Unknown 2011    Codeine Hives and Rash 2011    Doxycycline Nausea And Vomiting and Unknown 10/11/2023    Latex Hives and Unknown 2011    Morphine Hives and Rash 2011    Penicillins Hives 10/11/2023    Soap Unknown and Hives 10/11/2023    Tetracycline Hives 10/11/2023    Amitriptyline Other 2011    Grass pollen Itching 2016    Infliximab Unknown 10/11/2023    Other Hives and Unknown 10/11/2023    Rubber, unspecified Unknown 10/11/2023    Trace metals Unknown 10/11/2023    Weed pollen Itching 2016    Methocarbamol Rash and Unknown 10/11/2023    Nickel Rash 10/11/2023    Sulfa (sulfonamide antibiotics) Nausea/vomiting and GI Upset 2011         Current Medications has a current medication list which includes the following prescription(s): acetaminophen, aspirin, atorvastatin, bupropion sr, cyclobenzaprine, dapagliflozin propanediol, docusate sodium, furosemide, gabapentin, gabapentin, golimumab, hydromorphone, hydromorphone, leflunomide, levothyroxine, lisinopril, multivit-minerals/folic acid, naloxone, onetouch ultra test, potassium gluconate, and tresiba flextouch u-100, and the following Facility-Administered Medications: methylprednisolone sod succinate.       Vitals:  Vitals:    24 0903  "01/09/24 0955   BP: 109/67 117/77   Pulse: 96 91   Resp: 18 18   Temp: 36.7 °C (98.1 °F) 36.3 °C (97.3 °F)   SpO2: 93% 98%             Infusion Pre-procedure Checklist:   - Allergies reviewed: yes   - Medications reviewed: yes       - Previous reaction to current treatment: no      Assess patient for the concerns below. Document provider notification as appropriate.  - Active or recent infection with/without current antibiotic use: no  - Recent or planned invasive dental work: no  - Recent or planned surgeries: no  - Recently received or plans to receive vaccinations: no  - Has treatment related toxicities: no  - Is pregnant:  no      Pain: 0   - Is the pain different from normal: no   - Is the pain tolerable: no   - Is your Doctor aware:  no      Labs: N/A         Fall Risk Screening:         Review Of Systems:  Review of Systems   All other systems reviewed and are negative.        Infusion Readiness:   - Assessment Concerns Related to Infusion: No  - Provider notified: n/a      Document Below Only If Indicated:   New Patient Education:    N/A (returning patient for continuation of therapy. Ongoing education provided as needed.)        Treatment Conditions & Drug Specific Questions:    Golimumab  (SIMPONI)    (Unless otherwise specified on patient specific therapy plan):     TREATMENT CONDITIONS:  Unless otherwise specified on patient specific thearpy plan HOLD and notify provider prior to proceeding with today's infusion if patient with:  o Positive T-Spot  o Positive Hepatitis B Surface Ag    Lab Results   Component Value Date    TBSIN Negative 07/20/2022    QFG NEGATIVE 07/30/2018      No results found for: \"HAGCN\", \"HEPBSURABI\", \"HBSAG\", \"XHAGF\", \"HEPBSAG\", \"EXTHEPBSAG\", \"NONUHSWGH\"   No results found for: \"NONUHFIRE\", \"NONUHSWGH\", \"NONUHFISH\", \"EXTHEPBSAG\"  No results found for: \"HEPATOT\", \"HEPAIGM\", \"HEPBCIGM\", \"HEPBCAB\", \"HBEAG\", \"HEPCAB\"   No results found for: \"HEPCAB\"  No results found for: \"HBCTI\", " "\"HEPBCAB\"    Labs reviewed and patient meets treatment conditions? Yes    DRUG SPECIFIC QUESTIONS:   - Up to date on all immunizations? Yes    Immunization History   Administered Date(s) Administered    Flu vaccine (IIV4), preservative free *Check age/dose* 09/28/2013, 09/14/2016, 10/06/2020    Influenza, High Dose Seasonal, Preservative Free 09/04/2019, 09/09/2023    Influenza, seasonal, injectable 10/09/2014, 10/26/2015    Novel influenza-H1N1-09, preservative-free 12/23/2009    Pfizer Gray Cap SARS-CoV-2 04/25/2022    Pfizer Purple Cap SARS-CoV-2 01/17/2021, 03/27/2021, 08/20/2021    Pneumococcal conjugate vaccine, 20-valent (PREVNAR 20) 05/27/2022    Pneumococcal polysaccharide vaccine, 23-valent, age 2 years and older (PNEUMOVAX 23) 10/13/2014, 01/13/2017    Tdap vaccine, age 7 year and older (BOOSTRIX) 05/27/2022    Zoster, live 11/03/2012         - Any history of heart failure? No     ( Simponi may cause exacerbation of heart failure)     - Any history of cancer? No    (Box Warning: Malignancy)      REMINDER:  WEIGHT BASED DRUG    Recommended Vitals/Observation:  Vitals: Take vital signs prior to starting infusion, at infusion conclusion and as needed.   Observation: No observation.        Weight Based Drug Calculations:    WEIGHT BASED DRUGS: NOT APPLICABLE / FLAT DOSE          Initiated By: Gunjan Arias RN   Time: 10:12 AM     We administered golimumab (Simponi Aria) 137.5 mg in sodium chloride 0.9% 100 mL IV.      "

## 2024-01-11 ENCOUNTER — TELEPHONE (OUTPATIENT)
Dept: PRIMARY CARE | Facility: CLINIC | Age: 69
End: 2024-01-11
Payer: MEDICARE

## 2024-01-12 ENCOUNTER — OFFICE VISIT (OUTPATIENT)
Dept: PRIMARY CARE | Facility: CLINIC | Age: 69
End: 2024-01-12
Payer: MEDICARE

## 2024-01-12 VITALS
DIASTOLIC BLOOD PRESSURE: 64 MMHG | OXYGEN SATURATION: 94 % | HEART RATE: 110 BPM | SYSTOLIC BLOOD PRESSURE: 98 MMHG | BODY MASS INDEX: 31.77 KG/M2 | WEIGHT: 152 LBS | TEMPERATURE: 97.7 F | RESPIRATION RATE: 16 BRPM

## 2024-01-12 DIAGNOSIS — Z79.4 TYPE 2 DIABETES MELLITUS WITHOUT COMPLICATION, WITH LONG-TERM CURRENT USE OF INSULIN (MULTI): ICD-10-CM

## 2024-01-12 DIAGNOSIS — E78.2 MIXED HYPERLIPIDEMIA: Primary | ICD-10-CM

## 2024-01-12 DIAGNOSIS — E06.3 AUTOIMMUNE THYROIDITIS: ICD-10-CM

## 2024-01-12 DIAGNOSIS — L40.50 PSORIATIC ARTHRITIS (MULTI): ICD-10-CM

## 2024-01-12 DIAGNOSIS — E11.9 TYPE 2 DIABETES MELLITUS WITHOUT COMPLICATION, WITH LONG-TERM CURRENT USE OF INSULIN (MULTI): ICD-10-CM

## 2024-01-12 DIAGNOSIS — I10 ESSENTIAL (PRIMARY) HYPERTENSION: ICD-10-CM

## 2024-01-12 PROCEDURE — 3078F DIAST BP <80 MM HG: CPT | Performed by: NURSE PRACTITIONER

## 2024-01-12 PROCEDURE — 1036F TOBACCO NON-USER: CPT | Performed by: NURSE PRACTITIONER

## 2024-01-12 PROCEDURE — 4010F ACE/ARB THERAPY RXD/TAKEN: CPT | Performed by: NURSE PRACTITIONER

## 2024-01-12 PROCEDURE — 99349 HOME/RES VST EST MOD MDM 40: CPT | Performed by: NURSE PRACTITIONER

## 2024-01-12 PROCEDURE — 3074F SYST BP LT 130 MM HG: CPT | Performed by: NURSE PRACTITIONER

## 2024-01-12 PROCEDURE — 1125F AMNT PAIN NOTED PAIN PRSNT: CPT | Performed by: NURSE PRACTITIONER

## 2024-01-12 RX ORDER — PREDNISONE 10 MG/1
10 TABLET ORAL DAILY PRN
COMMUNITY
End: 2024-03-08 | Stop reason: SDUPTHER

## 2024-01-12 ASSESSMENT — ENCOUNTER SYMPTOMS
ALLERGIC/IMMUNOLOGIC NEGATIVE: 1
MYALGIAS: 1
FATIGUE: 1
ARTHRALGIAS: 1
CONSTIPATION: 1
BACK PAIN: 1
HEMATOLOGIC/LYMPHATIC NEGATIVE: 1
CARDIOVASCULAR NEGATIVE: 1
WEAKNESS: 1
EYES NEGATIVE: 1
JOINT SWELLING: 1
PSYCHIATRIC NEGATIVE: 1
RESPIRATORY NEGATIVE: 1

## 2024-01-12 ASSESSMENT — PAIN SCALES - GENERAL: PAINLEVEL: 10-WORST PAIN EVER

## 2024-01-12 NOTE — PROGRESS NOTES
Subjective   Patient ID: Nesha Mcleod is a 68 y.o. female  who is being seen for 2 month routine house calls follow up. .    HPI Pt seen in private apartment unaccompanied. PMHx: HTN, HLD, autoimmune thyroiditis, GERD, anxiety, Psoriatic arthritis, DJD. Pt seen sitting in chair with feet dependent. Pt with c/o pain in back 10/10 at present.Pt reports she takes her prn Dilaudid, Flexeril and gabapentin with mild relief.  Pt continues to live alone and is independent with ADL's Pt states holidays were hard as it was that it was the first [place] without her . Pt has been staying in the house lately due to fear of getting sick. Pt received Simponi infusion 1//9 and receives them every 2 months for her psoriatic arthritis. Pt report a fall second week of December when she was walking with rollator in home and her left leg gave out causing her to fall to the floor, no injuries reported. Pt having a hard time reaching high things in her apartment due to her back pain. Pt taking prn Prednisone for back pain and took one yesterday. Pt bought second microwave because she couldn't safely use the one above her stove. Pt reports blood sugars have been running high because she has been taking Prednisone more often.  this morning, pt was 236 last night. Pt denies fever, chills, night sweats, headaches, or dizziness. Denies vision or hearing problems. Denies problems chewing or swallowing, reports just got new dentures and has to get top one readjusted. Denied SOB, cough, CP or palpitations. Pt denies N/V/D, reports occasional constipation, that is releived with Docusate.  Denies dysuria, frequency or hematuria, reports urgency that recently caused an episode of incontinence. Pt states sleep is good, appetite reported as good. Pt tries to follow diabetic diet but states it's hard at times. Pt with no other complaints or concerns at this time. Home Visit medically necessary due to: pt has chronic condition that  makes access to a traditional office visit very difficult, illness or condition that results in activity limitation or restriction that impacts the ability to leave home such as; unsteady gait/ poor condition.        Review of Systems   Constitutional:  Positive for fatigue.   HENT:  Positive for congestion.    Eyes: Negative.    Respiratory: Negative.     Cardiovascular: Negative.    Gastrointestinal:  Positive for constipation.   Endocrine: Positive for cold intolerance.   Genitourinary:  Positive for urgency.   Musculoskeletal:  Positive for arthralgias, back pain, gait problem, joint swelling and myalgias.   Skin:  Positive for pallor.   Allergic/Immunologic: Negative.    Neurological:  Positive for weakness.   Hematological: Negative.    Psychiatric/Behavioral: Negative.         Objective   BP 98/64 (BP Location: Right arm, Patient Position: Sitting)   Pulse 110   Temp 36.5 °C (97.7 °F) (Temporal)   Resp 16   Wt 68.9 kg (152 lb)   SpO2 94%   BMI 31.77 kg/m²     Current Outpatient Medications:     acetaminophen (Tylenol) 325 mg tablet, Take 2 tablets (650 mg) by mouth every 4 hours if needed for mild pain (1 - 3), headaches or fever (temp greater than 38.0 C). THIS ORDER IS SUPERSEDED BY ANY OTHER MEDICATION ORDER FOR MILD PAIN (1-3). MAY BE USED FOR A HIGHER PAIN SCORE IF PATIENT REQUESTS, Disp: , Rfl:     aspirin 81 mg chewable tablet, Chew 1 tablet (81 mg) once daily. HOLD IF GIVEN IN LAST 24 HOURS OR IF HISTORY OF HYPERSENSITIVITY., Disp: , Rfl:     atorvastatin (Lipitor) 40 mg tablet, Take 1 tablet (40 mg) by mouth once daily., Disp: 90 tablet, Rfl: 2    buPROPion SR (Wellbutrin SR) 100 mg 12 hr tablet, Take 1 tablet (100 mg) by mouth once daily. Do not crush, chew, or split., Disp: 90 tablet, Rfl: 3    cyclobenzaprine (Flexeril) 10 mg tablet, Take 1 tablet (10 mg) by mouth 2 times a day as needed for muscle spasms., Disp: 60 tablet, Rfl: 2    dapagliflozin propanediol (Farxiga) 10 mg, Take 1 tablet  (10 mg) by mouth once daily., Disp: 90 tablet, Rfl: 3    docusate sodium (Dulcolax Stool Softener, dss,) 100 mg capsule, Take 1 capsule (100 mg) by mouth 2 times a day as needed., Disp: , Rfl:     furosemide (Lasix) 40 mg tablet, Take 1 tablet (40 mg) by mouth once daily., Disp: 90 tablet, Rfl: 3    gabapentin (Neurontin) 300 mg capsule, Take 3 capsules (900 mg) by mouth once daily at bedtime., Disp: , Rfl:     gabapentin (Neurontin) 300 mg capsule, Take 1 capsule (300 mg) by mouth 2 times a day., Disp: 60 capsule, Rfl: 5    golimumab (Simponi) 50 mg/0.5 mL pen injector, Inject under the skin once daily., Disp: , Rfl:     HYDROmorphone (Dilaudid) 2 mg tablet, Take 1 tablet (2 mg) by mouth 3 times a day as needed for moderate pain (4 - 6). Do not start before January 2, 2024., Disp: 84 tablet, Rfl: 0    leflunomide (Arava) 10 mg tablet, Take 1 tablet (10 mg) by mouth once daily., Disp: 90 tablet, Rfl: 3    levothyroxine (Synthroid, Levoxyl) 75 mcg tablet, Take 1 tablet (75 mcg) by mouth once daily in the morning. Take before meals., Disp: 90 tablet, Rfl: 3    lisinopril 20 mg tablet, Take 1 tablet (20 mg) by mouth once daily., Disp: 90 tablet, Rfl: 3    multivit-minerals/folic acid (CENTRUM ADULTS ORAL), Take by mouth once daily., Disp: , Rfl:     naloxone (Narcan) 4 mg/0.1 mL nasal spray, USE AS DIRECTED, Disp: , Rfl:     OneTouch Ultra Test strip, once daily., Disp: , Rfl:     potassium gluconate 595 mg (99 mg) tablet, Take 1 tablet by mouth once daily., Disp: , Rfl:     predniSONE (Deltasone) 10 mg tablet, Take 1 tablet (10 mg) by mouth once daily as needed., Disp: , Rfl:     Tresiba FlexTouch U-100 100 unit/mL (3 mL) injection, Inject 8 Units under the skin once daily in the morning., Disp: , Rfl:     Physical Exam  Constitutional:       Appearance: Normal appearance. She is normal weight.   HENT:      Head: Normocephalic and atraumatic.      Nose: Nose normal.      Mouth/Throat:      Mouth: Mucous membranes are  moist.      Pharynx: Oropharynx is clear.   Eyes:      Extraocular Movements: Extraocular movements intact.      Conjunctiva/sclera: Conjunctivae normal.      Pupils: Pupils are equal, round, and reactive to light.   Cardiovascular:      Rate and Rhythm: Normal rate and regular rhythm.      Pulses: Normal pulses.      Heart sounds: Normal heart sounds.   Pulmonary:      Breath sounds: Examination of the right-upper field reveals decreased breath sounds. Examination of the right-middle field reveals decreased breath sounds. Examination of the right-lower field reveals decreased breath sounds. Decreased breath sounds present.   Abdominal:      General: Bowel sounds are normal.      Palpations: Abdomen is soft.   Musculoskeletal:         General: Tenderness (back, knees) present. Normal range of motion.      Cervical back: Normal range of motion and neck supple.   Skin:     General: Skin is warm.      Capillary Refill: Capillary refill takes less than 2 seconds.   Neurological:      Mental Status: She is alert and oriented to person, place, and time.      Motor: Weakness present.      Gait: Gait abnormal.   Psychiatric:         Mood and Affect: Mood normal.         Behavior: Behavior normal.         Thought Content: Thought content normal.         Judgment: Judgment normal.       Patient Active Problem List   Diagnosis    Psoriatic arthritis (CMS/HCC)    Overactive bladder    Myalgia    Soft tissue mass    Neurogenic claudication due to lumbar spinal stenosis    Acquired partial lipodystrophy    Acute cystitis without hematuria    Acute renal failure syndrome (CMS/HCC)    ALT (SGPT) level raised    Altered mental status    Anal sphincter incontinence    Anxiety    Arthritis    Arthritis of neck    Autoimmune thyroiditis    Blepharitis    Blurred vision, bilateral    Cataract, nuclear sclerotic, both eyes    Cellulitis    Cervical radicular pain    Pain, radicular, lumbar    Choroidal nevus, left eye    Chronic  intractable pain    Chronic low back pain    Clouded consciousness    Colovaginal fistula    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    Consecutive exotropia    Change in bowel habits    Constipation    Contact dermatitis    Controlled insulin dependent type 1 diabetes mellitus (CMS/HCC)    Type 2 diabetes mellitus (CMS/HCC)    DDD (degenerative disc disease), thoracic    Dermatochalasis    Difficulty walking    Disorder of refraction    Disturbance, visual, subjective    Diverticulosis of colon    Dry eyes    Early cataract    Elevated creatine kinase    Essential (primary) hypertension    Fecal soiling due to fecal incontinence    Fistula involving female genital tract    Fracture of cervical vertebra (CMS/HCC)    Gastroesophageal reflux disease    Generalized osteoarthritis of multiple sites    Hemorrhoids without complication    Iron deficiency anemia    Leukopenia    Lumbar radiculitis    Mixed hyperlipidemia    Myofascial pain    Numbness    Obesity with body mass index 30 or greater    Palsy of conjugate gaze    Poor venous access    Incisional hernia    Postlaminectomy syndrome, lumbar region    Pseudophakia of both eyes    Psoriasis    Recurrent urinary tract infection    Retinopathy, background, nonproliferative, mild    Skin excoriation    Swelling of upper arm    Vaginal disorder    Weakness generalized       Preventative Medicine Counseling: Medication Education: Education: Current medication list reviewed and discussed, understanding, patient expressed understanding, adherence: No barriers to adherence identified.    Assessment/Plan   Diagnoses and all orders for this visit:  Mixed hyperlipidemia  Comments:  Continue Lipitor  Essential (primary) hypertension  Comments:  Continue Lisinopril  Type 2 diabetes mellitus without complication, with long-term current use of insulin (CMS/[facility])  Comments:  Continue Tresiba and Farxiga  Autoimmune thyroiditis  Comments:  Continue Levothyroxine  Psoriatic arthritis  (CMS/HCC)  Comments:  Continue Simponi  Follow up in 2 months with labs  ANYA Fierro-CNP

## 2024-01-15 ENCOUNTER — APPOINTMENT (OUTPATIENT)
Dept: RHEUMATOLOGY | Facility: CLINIC | Age: 69
End: 2024-01-15
Payer: MEDICARE

## 2024-01-24 ENCOUNTER — APPOINTMENT (OUTPATIENT)
Dept: PAIN MEDICINE | Facility: CLINIC | Age: 69
End: 2024-01-24
Payer: MEDICARE

## 2024-02-26 ENCOUNTER — OFFICE VISIT (OUTPATIENT)
Dept: PAIN MEDICINE | Facility: CLINIC | Age: 69
End: 2024-02-26
Payer: MEDICARE

## 2024-02-26 VITALS
BODY MASS INDEX: 31.91 KG/M2 | HEART RATE: 89 BPM | HEIGHT: 58 IN | WEIGHT: 152 LBS | SYSTOLIC BLOOD PRESSURE: 103 MMHG | DIASTOLIC BLOOD PRESSURE: 60 MMHG | RESPIRATION RATE: 16 BRPM

## 2024-02-26 DIAGNOSIS — M47.812 ARTHRITIS OF NECK: ICD-10-CM

## 2024-02-26 DIAGNOSIS — M48.061 SPINAL STENOSIS, LUMBAR REGION, WITHOUT NEUROGENIC CLAUDICATION: ICD-10-CM

## 2024-02-26 DIAGNOSIS — M96.1 POSTLAMINECTOMY SYNDROME, LUMBAR REGION: Primary | ICD-10-CM

## 2024-02-26 DIAGNOSIS — M79.18 CERVICAL MYOFASCIAL PAIN SYNDROME: ICD-10-CM

## 2024-02-26 PROCEDURE — 1159F MED LIST DOCD IN RCRD: CPT | Performed by: NURSE PRACTITIONER

## 2024-02-26 PROCEDURE — 4010F ACE/ARB THERAPY RXD/TAKEN: CPT | Performed by: NURSE PRACTITIONER

## 2024-02-26 PROCEDURE — 3078F DIAST BP <80 MM HG: CPT | Performed by: NURSE PRACTITIONER

## 2024-02-26 PROCEDURE — 1125F AMNT PAIN NOTED PAIN PRSNT: CPT | Performed by: NURSE PRACTITIONER

## 2024-02-26 PROCEDURE — 3074F SYST BP LT 130 MM HG: CPT | Performed by: NURSE PRACTITIONER

## 2024-02-26 PROCEDURE — 99213 OFFICE O/P EST LOW 20 MIN: CPT | Performed by: NURSE PRACTITIONER

## 2024-02-26 PROCEDURE — 1160F RVW MEDS BY RX/DR IN RCRD: CPT | Performed by: NURSE PRACTITIONER

## 2024-02-26 PROCEDURE — 1036F TOBACCO NON-USER: CPT | Performed by: NURSE PRACTITIONER

## 2024-02-26 RX ORDER — HYDROMORPHONE HYDROCHLORIDE 2 MG/1
2 TABLET ORAL 3 TIMES DAILY PRN
Qty: 84 TABLET | Refills: 0 | Status: SHIPPED | OUTPATIENT
Start: 2024-02-26 | End: 2024-05-15 | Stop reason: SDUPTHER

## 2024-02-26 RX ORDER — HYDROMORPHONE HYDROCHLORIDE 2 MG/1
2 TABLET ORAL 3 TIMES DAILY PRN
Qty: 84 TABLET | Refills: 0 | Status: SHIPPED | OUTPATIENT
Start: 2024-04-22 | End: 2024-05-15 | Stop reason: SDUPTHER

## 2024-02-26 RX ORDER — HYDROMORPHONE HYDROCHLORIDE 2 MG/1
2 TABLET ORAL 3 TIMES DAILY PRN
Qty: 84 TABLET | Refills: 0 | Status: SHIPPED | OUTPATIENT
Start: 2024-03-25 | End: 2024-05-15 | Stop reason: SDUPTHER

## 2024-02-26 RX ORDER — GABAPENTIN 300 MG/1
300 CAPSULE ORAL 2 TIMES DAILY
Qty: 60 CAPSULE | Refills: 2 | Status: SHIPPED | OUTPATIENT
Start: 2024-02-26 | End: 2024-05-15 | Stop reason: SDUPTHER

## 2024-02-26 RX ORDER — CYCLOBENZAPRINE HCL 10 MG
10 TABLET ORAL 2 TIMES DAILY PRN
Qty: 60 TABLET | Refills: 2 | Status: SHIPPED | OUTPATIENT
Start: 2024-02-26 | End: 2024-05-15 | Stop reason: SDUPTHER

## 2024-02-26 ASSESSMENT — ENCOUNTER SYMPTOMS
ALLERGIC/IMMUNOLOGIC NEGATIVE: 1
ENDOCRINE NEGATIVE: 1
LIGHT-HEADEDNESS: 0
RESPIRATORY NEGATIVE: 1
SPEECH DIFFICULTY: 0
LOSS OF SENSATION IN FEET: 0
EYES NEGATIVE: 1
DEPRESSION: 0
HEADACHES: 0
PSYCHIATRIC NEGATIVE: 1
JOINT SWELLING: 1
CONSTITUTIONAL NEGATIVE: 1
SEIZURES: 0
NECK PAIN: 1
OCCASIONAL FEELINGS OF UNSTEADINESS: 1
DIZZINESS: 0
MYALGIAS: 1
ARTHRALGIAS: 1
WEAKNESS: 1
BACK PAIN: 1
FACIAL ASYMMETRY: 0
NECK STIFFNESS: 0
NUMBNESS: 1
GASTROINTESTINAL NEGATIVE: 1
HEMATOLOGIC/LYMPHATIC NEGATIVE: 1
CARDIOVASCULAR NEGATIVE: 1
TREMORS: 0

## 2024-02-26 ASSESSMENT — PAIN - FUNCTIONAL ASSESSMENT: PAIN_FUNCTIONAL_ASSESSMENT: 0-10

## 2024-02-26 ASSESSMENT — PAIN SCALES - GENERAL
PAINLEVEL_OUTOF10: 5 - MODERATE PAIN
PAINLEVEL: 5

## 2024-02-26 ASSESSMENT — PAIN DESCRIPTION - DESCRIPTORS: DESCRIPTORS: STABBING;DULL

## 2024-02-26 NOTE — PROGRESS NOTES
Subjective   Patient ID: Nesha Mcleod is a 69 y.o. female presents for chronic pain management.       Nesha follows up for interval reevaluation for chronic low back pain from lumbar post laminectomy syndrome and mid back pain from thoracic disc herniations.  Right hip pain from arthritis.  Does follow rheumatology and receives DMARD infusions.    Hydromorphone 2 MG up to 3 times daily as needed, gabapentin 300 MG to a total of twice daily due to renal impairment, once a day and Flexeril 10 MG twice a day as she needs reduces pain and improves function up to 85%. Average pain score is 5 out of 10 with medication. Better able to manage ADLs with improved function since injection therapy in combination with medicinal therapy. Has an average quality family and social life with current condition and treatment.      Toxicology consistent May 17, 2023.  Annual controlled substance agreement and opioid risk tool are completed and scanned into the chart May 17, 2023.     Serena is with laminectomy changes at T11, L2, L3 and L5 levels is with a grade 1 anterolisthesis of L5 on S1. At the L2-L3 level laminectomy changes are also noted with scoliosis and disc degeneration. Is also with moderate facet joint arthropathy. The combination of these changes causes moderate to severe central canal stenosis, severe left lateral recess and moderate right lateral recess stenosis with severe left neural foramina and moderate to severe right neuroforaminal narrowing. MRI was from April 20, 2021 ordered by primary care doctor from [facility].    For continuity:   Given the patient's report of reduced pain and improved functional ability without adverse effects, it is reasonable to treat with narcotic medications. The terms of the opioid agreement as well as the potential risks and adverse effects of the patient's medication regimen were discussed in detail. This includes if applicable due to dosage of medication permission to discuss and  coordinate care with other treatment providers relevant to the patients condition. The patient verbalized understanding.     Risks and side effects of chronic opioid therapy including but not limited to tolerance, dependence, constipation, hyperalgesia, cognitive side effects, addiction and possible death due to overuse and or misuse were discussed. I also discussed that such medications when co-administered with other sedative agents including but not limited to alcohol, benzodiazepines, sedative hypnotics and illegal drugs could pose life threatening consequences including death. I also explained the impact that the administration of such medication has on a patient with obstructive sleep apnea and continued recommendations for use of apnea devices if ordered are prescribed by other physicians. In order to effectively and safely treat the pain, I also emphasized the importance of compliance with the treatment plan, as well as compliance with the terms of the opioid agreement, which was reviewed in detail. I explained the importance of being responsible with the medications and to take these only as prescribed, never in excess and never for reasons other than pain reduction. The patient was counseled on keeping the medications safe and locked away from children and other adults as well as disposal methods and options. The patient understood the risks and instructions.     I also discussed with the patient in detail that based on the clinical response to the opioid medications and improvements of activities of daily living, sleep, and work performance in light of compliance with the treatment plan we can continue this form of therapy for the above chronic pain. The goal and rationale used for current treatment with chronic opioid medication is to control the pain and alleviate disability induced by the chronic pain condition noted above after failures of other non-opioid and nonpharmacological modalities to treat  the chronic pain and the symptoms associated with have failed. The patient understood the goals in terms of the above treatment plan and had no further questions prior to leaving the office today.     Of note, the above-mentioned diagnoses/conditions and expected fluctuating nature of pain, and pain characteristic changes may lead to prolonged functional impairment requiring frequent and multiple reassessments with continued high level medical decision making. As noted, medication and medication management may require opiate therapy in excess of a routine less than 30 day medication requirement. The patient may require daily opiate therapy necessitating month-long prescription medication as noted above in order to perform activities of daily living and achieve acceptable quality of life with respect to their chronic pain condition for the foreseeable future. We monitor our patient's carefully through drug monitoring, medication counts, urine drug testing specific to their medication as well as a myriad of other substances and with frequent follow-ups with interval reassement of the chronic pain condition, its pathophysiology and prognosis.     The level of clinical decision making at this office visit is high due to high risks and complications including mortality and morbidity related to acute and chronic pain with respects to life, bodily function, and treatment. Risks and clinical decisions with respect to under treatment, failure to maintain adequate treatment, and/or overtreatment complications and outcomes were discussed with the patient with respect to their chronic pain conditions, interventional therapies, as well as the use of various medications including possible controlled/dangerous medications. The amount and complexity of reviewed data at this in subsequent office visits is high given patient's fluctuating clinical presentation, laboratory and radiographic reports, prescription monitoring program  data, and medication history as well as other relevant data as noted above. Pertinent negatives and positives data was used in consideration for the above-mentioned high complexity.      Given the patient's total MED, general use of daily opiates, or other coadministered medications in various classes the patient was offered a prescription for Narcan. I instructed the patient that it is important that patient fill this medication in order to demonstrate understanding of the gravity of possible side effects including respiratory depression and risk of overdose of this opiate load or medication combination. As such patient will be required to bring Narcan prescription to follow-up appointments as part of compliance with continued opiate care.     With respect to opiate induced constipation I discussed multiple ways to combat this problem including staying hydrated and taking over-the-counter medications such as Dulcolax, Miralax and Senna. If these treatments are not effective we could consider such medications as Amitiza, Linzess and Movantik.     Disclaimer: This note was transcribed using an audio transcription device. As such, minor errors may be present with regard to spelling, punctuation, and inadvertent word insertion. Please disregard such errors.    XR hip right 2 or 3 views  Status: Final result     Signed by    Signed Time Phone Pager   Love Maots MD 9/05/2023 11:03 [phone]      Exam Information    Status Exam Begun Exam Ended   Final 9/05/2023 10:55      Study Result    Narrative & Impression   PROCEDURE:         HIP RT 2 VIEW W OR WO AP PELVIS - IXR  0057  REASON FOR EXAM: hip pain     RESULT: MRN: [id number]  Patient Name: LIZZIE PHILLIPS     STUDY:  HIP RT 2 VIEW W OR WO AP PELVIS 9/5/2023 10:55 am     INDICATION:  Right hip pain with history of fall 1 week ago     COMPARISON:  None available.     ACCESSION NUMBER(S):  [id number]     ORDERING CLINICIAN:  KATHY LOBO     TECHNIQUE:  AP view of the  pelvis with AP and lateral views of the right hip.     FINDINGS:  No fracture or dislocation of the right hip is demonstrated. There is no  acute bony abnormality of the pelvis. There is chondrocalcinosis of the hips  bilaterally. Arterial calcification is also present. There are pelvic  phleboliths identified.     IMPRESSION:  No fracture or dislocation of the right hip with no pelvic fracture  identified.     Chondrocalcinosis of the hips bilaterally.  Dictation workstation:   [id number]     Original Interpreting Physician:   LATOSHA FRANCOIS M.D.  Original Transcribed by/Date: MMODAL Sep  5 2023  9:48A  Original Electronically Signed by/Date: LATOSHA FRANCOIS M.D. Sep  5 2023 11:03A     Addendum Interpreting Physician:  Addendum Transcribed by/Date: NO ADDENDUM  Addendum Electronically Signed by/Date:     Narrative & Impression   PROCEDURE:         HIP RT WO CONTRAST - ICT  3170  REASON FOR EXAM: Hip trauma, fracture suspected, xray done     RESULT: MRN: [id number]  Patient Name: LIZZIE PHILLIPS     STUDY:  HIP RT WO CONTRAST 9/5/2023 11:50 am     INDICATION:  Pain and fall     COMPARISON:  None available.     ACCESSION NUMBER(S):  [id number]     ORDERING CLINICIAN:  PATTI WASHINGTON     TECHNIQUE:  Unenhanced axial images through the right hip are performed with sagittal  and coronal reformations completed by the technologist at the acquisition  scanner.     FINDINGS:  There is no fracture or dislocation of the right hip. No fracture of the  right hemipelvis is observed. There is chondrocalcinosis of the right hip as  well as osteoarthritis. Small osteophytes project from the right femoral  head.     No intramuscular hematoma is seen.     There is vacuum disc at the L4-5 and L5-S1 levels with a grade 1  spondylolisthesis of L5 on S1 with unilateral spondylolysis on the left at  L5 identified. There is also considerable facet arthrosis bilaterally at  L4-5 and L5-S1 levels.     The urinary bladder is moderately distended.         IMPRESSION:  No fracture or dislocation of the right hip with no fracture of right  hemipelvis identified.     Grade 1 spondylolisthesis of L5 on S1 with pars defect on the left at L5.     Moderate distention of urinary bladder.     Mild osteoarthritis of the right hip with chondrocalcinosis.  Dictation workstation:   [id number]  This exam is available in DICOM format to non-affiliated healthcare  facilities on a secure media free searchable basis with prior patient  authorization.  The patient exposure is reported to a radiation dose index  registry.  All CT examinations are performed with one or more of the  following dose reduction techniques: Automated Exposure Control, Adjustment  of mA and/or KV according to patient size, or use of iterative  reconstruction techniques.     Original Interpreting Physician:   LATOSHA FRANCOIS M.D.  Original Transcribed by/Date: MMODAL Sep  5 2023 11:13A  Original Electronically Signed by/Date: LATOSHA FRANCOIS M.D. Sep  5 2023 12:04P     Narrative & Impression   PROCEDURE:         SPINE LUMBAR WO CONTRAST - CMR  2010  REASON FOR EXAM: SPINAL STENOSIS OF LUMBAR REGION WITH NEUROGENIC  CLAUDICATION     RESULT: MRN: [id number]  Patient Name: LIZZIE PHILLIPS     STUDY:  SPINE LUMBAR WO CONTRAST; 5/23/2023 1:16 pm     INDICATION:  SPINAL STENOSIS OF LUMBAR REGION WITH NEUROGENIC CLAUDICATION. 68-year-old  woman with chronic lower back pain and bilateral leg pain     COMPARISON:  Lumbar spine MRI 04/20/2021.     ACCESSION NUMBER(S):  [id number]     ORDERING CLINICIAN:  APRIL FLORES     TECHNIQUE:  Multiecho and multiplanar imaging of the lumbar spine was performed without  IV contrast.     FINDINGS:  Redemonstrated levoscoliosis of the lumbar spine. Interbody fusion of the L3  and L4 vertebral bodies again noted with laminectomy changes at L2, L3, and  L5 again seen.     Moderate to severe disc space narrowing is again noted at L1-L2, and L2-L3.        Alignment: There is grade 1 anterolisthesis of  L5 on S1. There is grade 1  retrolisthesis of L2 on L3.        Bone marrow signal: Redemonstrated heterogeneous bone marrow signal, which  could be related to marrow reconversion process. Redemonstrated slightly  increased STIR signal within the L2 vertebral body, which may reflect  reactive bone marrow edema in the setting of degenerative change.        Conus: The conus medullaris terminates at the level of L1.        Paraspinal Soft tissues: Postsurgical changes in the posterior lumbar soft  tissues again noted.        Imaged Abdomen: Millimetric T2 hyperintense, T1 hypointense right and left  renal cysts are seen.        T12-L1: Moderate left paracentral disc bulge again noted causing effacement  of the left side of the ventral thecal sac. No significant spinal canal or  neural foraminal narrowing.        L1-2: Mild disc bulge with facet arthropathy is again noted, resulting in  mild spinal canal narrowing. There is also mild bilateral neural foraminal  narrowing, more pronounced on the right.        L2-3: Laminectomy changes again noted. There is broad-based disc osteophyte  with facet hypertrophy resulting in severe spinal canal stenosis. Severe  bilateral neural foraminal narrowing is identified with likely contact and  impingement upon the exiting bilateral nerve roots.        L3-4: Redemonstrated interbody fusion/ankylosis of the L3-L4 vertebral  bodies with laminectomy changes noted. Mild facet joint arthropathy. No  significant spinal canal narrowing. Mild right neural foraminal narrowing.        L4-5: Spondylolisthesis with broad-based disc protrusion, facet hypertrophy,  and mild ligamentum flavum hypertrophy results in mild spinal canal  narrowing. There is also moderate right and mild-to-moderate left neural  foraminal narrowing.        L5-S1: Laminectomy changes again noted. No sign of spinal canal narrowing.  There is moderate facet joint hypertrophy. There is mild-to-moderate left  and mild right  neural foraminal narrowing.     IMPRESSION:  1. Redemonstrated postsurgical and degenerative changes of the lumbar spine,  with multilevel neural foraminal narrowing and spinal canal narrowing.     2. At L2-L3, there is severe bilateral neural foraminal narrowing and severe  spinal canal stenosis.     3. Please see above for full description of findings at each individual disc  level.     4. Redemonstrated heterogeneous bone marrow signal, as can be seen with  marrow reconversion process. Please correlate with appropriate clinical and  laboratory data.        Dictation workstation:   [id number]     Original Interpreting Physician:   SHAQ SOLO MD  Original Transcribed by/Date: MMODAL May 23 2023 12:15P  Original Electronically Signed by/Date: SHAQ SOLO MD May 23 2023  2:52P       Results/Data  Xray Cervical Spine 2 or 3 View 02Aug2022 08:15AM Shayy Foreman      Test Name Result Flag Reference   Xray Cervical Spine 2 or 3 View (Report)       FINAL REPORT  Interpreted by: DICKSON MCNEIL   08/03/22 11:15  MRN: [id number]  Patient Name: LIZZIE PHILLIPS     STUDY:  SPINE, CERVICAL, 2 OR 3 VIEWS; ; 8/2/2022 8:15 am     INDICATION:  CERVICAL PAIN M54.2: Cervical pain.     COMPARISON:  06/27/2022     ACCESSION NUMBER(S):  [id number]     ORDERING CLINICIAN:  SHAYY FOREMAN     FINDINGS:  Stable right central venous catheter. Postoperative changes C5-C6  ACDF. Atlantoaxial interval is maintained. Irregularity along the C2  odontoid base compatible better evaluated on the CT from 06/27/2022.  No fracture or traumatic malalignment. Mild multilevel degenerative  changes of the cervical spine. Precervical soft tissue planes are  maintained. Imaged lung fields are clear.     IMPRESSION:  No acute fracture or traumatic malalignment.  Stable CT odontoid irregularity likely secondary to history of  rheumatoid arthritis.  Stable postoperative changes C5-C6 ACDF.  Mild multilevel degenerative changes of the cervical  spine.        Electronically signed by: EWA  08/03/22 11:15      Xray Cervical Spine 2 or 3 View 27Jun2022 11:35PM Gaye Skelton      Test Name Result Flag Reference   Xray Cervical Spine 2 or 3 View (Report)       FINAL REPORT  Interpreted by: LOBITO PIKE   06/28/22 00:04  MRN: [id number]  Patient Name: LIZZIE PHILLIPS     STUDY:  Cervical spine x-rays     INDICATION:  fx     COMPARISON:  X-ray cervical spine 08/24/2021  MRI cervical spine 05/28/2020     ACCESSION NUMBER(S):  [id number]     ORDERING CLINICIAN:  GAYE SKELTON     TECHNIQUE:  Four views of the cervical spine     FINDINGS:  Limited exam given nonvisualization of the lower cervical spine on  lateral view.     Alignment: Minimal anterior subluxation of C3-4, degenerative.     Bones: Minimal multilevel loss of vertebral body height. ACDF of  C5-C6.     Disc spaces: Moderate spondylosis.     Soft tissues: Right-sided chest port. Shoulder arthroplasty.     IMPRESSION:  1. Limited exam.  2. No acute finding.     Electronically signed by: SHAHZAD  06/28/22 00:04      Xray Knee 1 or 2 View [id number] 03:30PM Toussaint, Karl      Test Name Result Flag Reference   Xray Knee 1 or 2 View (Report)       FINAL REPORT  Interpreted by: BRITTANY FAYE HANAUER, MD   02/14/22 15:52  MRN: [id number]  Patient Name: LIZZIE PHILLIPS     STUDY:  KNEE; 1 OR 2 VIEWS; 2/14/2022 3:30 pm     INDICATION:  Status post total knee replacement in pacu .     COMPARISON:  None     ACCESSION NUMBER(S):  [id number]     ORDERING CLINICIAN:  KARL TOUSSAINT     TECHNIQUE:  AP and lateral portable postoperative views of the right knee were  obtained.     FINDINGS:  Immediately status post right knee arthroplasty. Femoral and tibial  components appear well seated and in good alignment. Patellar  component is radiolucent. There is postsurgical soft tissue air and  also edema anteriorly in the soft tissues and in the suprapatellar  bursa. No lytic or blastic destructive bone lesion. No  acute  fracture or dislocation.     IMPRESSION:  Immediately status post right knee arthroplasty. Postsurgical changes  as described. No acute fracture.     Electronically signed by: BRITTANY FAYE  02/14/22 15:52      MRI L Spine without Contrast 20Apr2021 09:05AM Non Ambulatory, Provider   Ordering Provider: MARIAMA LIN [id number]      Test Name Result Flag Reference   MRI L Spine without Contrast (Report)       Interpreted by: MARCIA PADILLA  04/20/21 10:12  MRN: [id number]  Patient Name: LIZZIE PHILLIPS     STUDY:  MRI L-SPINE WO; ; 4/20/2021 9:05 am     INDICATION:  M48.062 Spinal stenosis, lumbar region with neurogenic claudication.  M48.062 Spinal stenosis, lumbar region with neurogenic claudication.     COMPARISON:  12/16/2020, 04/16/2020     ACCESSION NUMBER(S):  [id number]     ORDERING CLINICIAN:  MARIAMA LIN     TECHNIQUE:  Multiplanar, multisequence imaging of the lumbar spine was performed  without intravenous contrast administration.     FINDINGS:  Moderate levoscoliosis noted. Conus medullaris terminates at the  level of L1 vertebral body. Visualized portions of the conus and the  spinal cord shows normal signal intensity and caliber.     Heterogeneous bone marrow signal intensity noted, likely related to  osteopenia/osteoporosis. Ankylosis of the L3 and L4 vertebral bodies  noted. Laminectomy changes at T11, L2, L3 and L5 levels are again  noted. Grade 1 anterolisthesis of L5 on S1, trace retrolisthesis of  L1 on L2.     Again note is made of the prompting of the nerve roots at L4-L5 level.     At T11-T12 there is moderate left paracentral disc bulge noted  causing moderate indentation of the left side of the thecal sac with  mild flattening and posterior displacement of the cord. No cord  edema. Laminectomy changes noted. Asymmetric right-sided facet joint  arthropathy, ligamentum flavum hypertrophy indenting the  posterolateral aspect of the thecal sac, there is moderate central  spinal canal  stenosis. Mild encroachment of the bilateral neural  foramina.     At L1-L2 mild disc bulge, facet joint arthropathy noted causing  minimal encroachment of the spinal canal. Mild encroachment of the  bilateral neural foramina noted.     At L1-L2 scoliosis, broad-based disc osteophyte, mild facet joint  arthropathy in combination noted causing mild-to-moderate central  spinal canal stenosis, and right neural foramina narrowing. Mild  encroachment of the left neural foramina.     At L2-L3 laminectomy changes noted, scoliosis, broad-based disc  osteophyte, moderate facet joint arthropathy in combination noted  causing moderate to severe central spinal canal, severe left lateral  recess, moderate right lateral recess stenosis, severe left neural  foramina, moderate to severe right neural foramina narrowing.     At L3-L4 there is ankylosis of the L3-L4 vertebral bodies noted.  Laminectomy changes noted. Mild facet joint arthropathy. No  significant central spinal canal or neural foramina stenosis.     At L4-L5 broad-based central disc protrusion noted, moderate facet  joint arthropathy noted causing mild encroachment of the spinal  canal, mild right neuroforaminal and mild-to-moderate left neural  narrowing.     At L5-S1 again note is made of the laminectomy changes. Moderate  facet joint arthropathy, asymmetric on the right side indenting the  posterolateral aspect of the right side of the thecal sac. No  significant central spinal canal stenosis, mild encroachment of the  bilateral neural foramina, left greater than the right.     IMPRESSION:  Postoperative, multilevel degenerative spinal canal or neural  foramina stenosis as described above, predominantly in the lower  thoracic and upper lumbar spine, worse changes are noted at L2-L3.  There is no significant change from prior.        Electronically signed by: MARCIA PADILLA  04/20/21 10:12      MRI Cervical without Contrast [id number] 11:56AM Regine Le   [Justo 2,  2020 3:40PM Rosalio Webb]  AMA Intake Activity Log Entry by Rosalio Webb ([email or web address]) on 6/2/2020 3:37 PM  Status Change: To Confirmed - N/A   [Jun 2, 2020 3:40PM Rosalio Webb]  AMA Intake updated by Rosalio Webb ([email or web address]) on 6/2/2020 3:37 PM  New Recipient: Regine Le Appointment Date: May 28 2020 11:56AM   [May 27, 2020 12:33PM Regine Le]  Reason: Unspecified for MRI Cervical without Contrast      Test Name Result Flag Reference   MRI Cervical without Contrast (Report)       Interpreted by: IVETTE RAPHAEL  05/28/20 13:01  MRN: [id number]  Patient Name: LIZZIE PHILLIPS     STUDY:  MRI CERVICAL WO; 5/28/2020 11:56 am     INDICATION:  M96.1 Postlaminectomy syndrome, not elsewhere classified  M54.12  Radiculopathy, cervical region   neck pain.     COMPARISON:  March 4, 2015     ACCESSION NUMBER(S):  [id number]     ORDERING CLINICIAN:  REGINE LE     TECHNIQUE:  Sagittal T1, T2, STIR, axial T1 and axial T2 weighted images were  acquired through the cervical spine.     FINDINGS:  Alignment: There is new, grade 1 anterolisthesis of C3 on C4 and to a  lesser extent of C2 on C3. There is subtle, grade 1 anterolisthesis  of C7 on T1, T1 on T2, and T2 on T3.     Vertebrae/Intervertebral Discs: There is artifact due to anterior  fusion hardware at C5 and C6. The marrow signal is otherwise  inhomogeneous throughout on T1 and T2 weighted imaging and there are  multilevel degenerative endplate signal changes. There is new  ill-defined abnormal diminished signal on T1 and increased signal on  STIR imaging within the odontoid process and extending into the  superior aspect of the vertebral body. There are degenerative changes  of the craniocervical articulations and at the atlantodental interval.     Cord: The cervical cord is degraded by artifact particularly from  C4-5 through C6-7.     C1-C2: There is ligamentous thickening surrounding the odontoid.  There is no central canal stenosis.     C2-C3: There is no  posterior disc contour abnormality. Left greater  than right degenerative facet changes do not produce significant  central canal or neural foraminal stenosis.     C3-C4: There is diffuse disc bulging, more pronounced than seen  previously. There has been marked interval progression of  degenerative facet arthropathy on the left. Abnormal signal is noted  within the left facet on STIR and T2 weighted imaging. There is also  left greater than right uncovertebral joint hypertrophy. Overall  there is severe left and mild right-sided neural foraminal stenosis.  There is no significant narrowing of the central canal.     C4-C5: Evaluation is limited due to artifact from the surgical  hardware. Left greater than right degenerative facet arthropathy is  suspected. There is a posterior disc/osteophyte complex producing at  least partial effacement of the ventral subarachnoid space. There is  likely mild central canal stenosis and left greater than right neural  foraminal narrowing.     C5-C6: Evaluation is markedly limited due to artifact from the  surgical hardware.     C6-C7: Evaluation is markedly limited due to artifact from the  surgical hardware. There does appear to be mild disc bulging and  endplate spurring without appreciable central canal stenosis.     C7-T1: There is minimal disc bulging without central canal stenosis.  Facet and uncovertebral joint hypertrophy produce mild neural  foraminal stenosis.     T1-2: There is mild narrowing of the central canal due to disc  protrusion.     The sagittal images demonstrate disc protrusions at T2-3 and T3-4  with partial effacement of the ventral subarachnoid space.     The prevertebral and posterior paraspinous soft tissues are within  normal limits.     IMPRESSION:  1. Postoperative changes at C5 and C6. Associated artifact limits the  examination.  2. New abnormal signal within the odontoid process extending into the  superior aspect of the C2 vertebral body is  nonspecific. A subacute  fracture could give this appearance. Alternatively, the signal  changes could be related to an erosive arthritis. CT of the cervical  spine may be of benefit for additional characterization.  3. Grade 1 anterolisthesis of C3 on C4 is new from 2015. Abnormal  signal within the left C3-4 facet is thought to most likely be  degenerative in etiology. An infectious or inflammatory arthritis  could also give this appearance, however.  4. Multilevel degenerative changes of the cervical and upper thoracic  spine.     An Orange alert message was sent to the referring physician Dr. ANH PENA through the [employer] system at 1:01 pm on 5/28/2020 by  Dr.Jennifer Muñoz     Electronically signed by: IVETTE MUÑOZ  05/28/20 13:01       Review of Systems   Constitutional: Negative.    HENT: Negative.     Eyes: Negative.    Respiratory: Negative.     Cardiovascular: Negative.    Gastrointestinal: Negative.    Endocrine: Negative.    Genitourinary: Negative.    Musculoskeletal:  Positive for arthralgias, back pain, gait problem, joint swelling, myalgias and neck pain. Negative for neck stiffness.   Skin: Negative.    Allergic/Immunologic: Negative.    Neurological:  Positive for weakness and numbness. Negative for dizziness, tremors, seizures, syncope, facial asymmetry, speech difficulty, light-headedness and headaches.   Hematological: Negative.    Psychiatric/Behavioral: Negative.         Objective   Physical Exam  Vitals and nursing note reviewed.   Constitutional:       Appearance: Normal appearance.   HENT:      Head: Normocephalic and atraumatic.   Eyes:      Conjunctiva/sclera: Conjunctivae normal.   Cardiovascular:      Pulses: Normal pulses.   Pulmonary:      Effort: Pulmonary effort is normal. No respiratory distress.   Musculoskeletal:      Right lower leg: No edema.      Left lower leg: No edema.      Comments: Point tenderness over L4-L5 well-healed laminectomy scar and to the right of  L4-L5.    Posture stooped.  Unable to stand erect due to pain and habitus.    Ambulates with guarded gait and rollator walker.   Skin:     General: Skin is warm and dry.      Capillary Refill: Capillary refill takes 2 to 3 seconds.   Neurological:      General: No focal deficit present.      Mental Status: She is alert and oriented to person, place, and time.      Cranial Nerves: No cranial nerve deficit.      Sensory: Sensory deficit present.      Motor: Weakness present.      Gait: Gait normal.      Comments: Allodynia to light touch L5 dermatomal distribution right posterior lateral hip and leg.    Weakness with resisted hip flexion, ankle dorsiflexion and plantarflexion 4 out of 5 both extremities.    Positive hip impingement to the right.    Negative straight leg raise.   Psychiatric:         Mood and Affect: Mood normal.         Behavior: Behavior normal.         Assessment/Plan   Problem List Items Addressed This Visit    None  Follow-up 12 weeks.    Reviewed and approved by JURGEN BEAVERS on 2/26/24 at 8:59 AM.

## 2024-03-05 ENCOUNTER — INFUSION (OUTPATIENT)
Dept: INFUSION THERAPY | Facility: CLINIC | Age: 69
End: 2024-03-05
Payer: MEDICARE

## 2024-03-05 VITALS
SYSTOLIC BLOOD PRESSURE: 109 MMHG | HEART RATE: 85 BPM | TEMPERATURE: 97.5 F | DIASTOLIC BLOOD PRESSURE: 64 MMHG | OXYGEN SATURATION: 98 % | RESPIRATION RATE: 18 BRPM

## 2024-03-05 DIAGNOSIS — L40.50 PSORIATIC ARTHRITIS (MULTI): ICD-10-CM

## 2024-03-05 PROCEDURE — 96365 THER/PROPH/DIAG IV INF INIT: CPT | Mod: INF | Performed by: INTERNAL MEDICINE

## 2024-03-05 PROCEDURE — 2500000004 HC RX 250 GENERAL PHARMACY W/ HCPCS (ALT 636 FOR OP/ED): Performed by: INTERNAL MEDICINE

## 2024-03-05 RX ORDER — FAMOTIDINE 10 MG/ML
20 INJECTION INTRAVENOUS ONCE AS NEEDED
Status: CANCELLED | OUTPATIENT
Start: 2024-04-02

## 2024-03-05 RX ORDER — HEPARIN SODIUM,PORCINE/PF 10 UNIT/ML
50 SYRINGE (ML) INTRAVENOUS AS NEEDED
OUTPATIENT
Start: 2024-03-05

## 2024-03-05 RX ORDER — ALBUTEROL SULFATE 0.83 MG/ML
3 SOLUTION RESPIRATORY (INHALATION) AS NEEDED
Status: CANCELLED | OUTPATIENT
Start: 2024-04-02

## 2024-03-05 RX ORDER — HEPARIN 100 UNIT/ML
500 SYRINGE INTRAVENOUS AS NEEDED
Status: DISCONTINUED | OUTPATIENT
Start: 2024-03-05 | End: 2024-03-05 | Stop reason: HOSPADM

## 2024-03-05 RX ORDER — DIPHENHYDRAMINE HYDROCHLORIDE 50 MG/ML
50 INJECTION INTRAMUSCULAR; INTRAVENOUS AS NEEDED
Status: CANCELLED | OUTPATIENT
Start: 2024-04-02

## 2024-03-05 RX ORDER — HEPARIN 100 UNIT/ML
500 SYRINGE INTRAVENOUS AS NEEDED
OUTPATIENT
Start: 2024-03-05

## 2024-03-05 RX ORDER — EPINEPHRINE 0.3 MG/.3ML
0.3 INJECTION SUBCUTANEOUS EVERY 5 MIN PRN
Status: CANCELLED | OUTPATIENT
Start: 2024-04-02

## 2024-03-05 RX ADMIN — GOLIMUMAB 137.5 MG: 50 SOLUTION INTRAVENOUS at 09:07

## 2024-03-05 RX ADMIN — Medication 500 UNITS: at 09:55

## 2024-03-05 NOTE — PATIENT INSTRUCTIONS
Today :We administered golimumab (Simponi Aria) 137.5 mg in sodium chloride 0.9% 100 mL IV and heparin flush.     For:   1. Psoriatic arthritis (CMS/[facility])         Your next appointment is due in:  4/30/24        Please read the  Medication Guide that was given to you and reviewed during todays visit.     (Tell all doctors including dentists that you are taking this medication)     Go to the emergency room or call 911 if:  -You have signs of allergic reaction:   -Rash, hives, itching.   -Swollen, blistered, peeling skin.   -Swelling of face, lips, mouth, tongue or throat.   -Tightness of chest, trouble breathing, swallowing or talking     Call your doctor:  - If IV / injection site gets red, warm, swollen, itchy or leaks fluid or pus.     (Leave dressing on your IV site for at least 2 hours and keep area clean and dry  - If you get sick or have symptoms of infection or are not feeling well for any reason.    (Wash your hands often, stay away from people who are sick)  - If you have side effects from your medication that do not go away or are bothersome.     (Refer to the teaching your nurse gave you for side effects to call your doctor about)    - Common side effects may include:  stuffy nose, headache, feeling tired, muscle aches, upset stomach  - Before receiving any vaccines     - Call the [facility] at   If:  - You get sick, are on antibiotics, have had a recent vaccine, have surgery or dental work and your doctor wants your visit rescheduled.  - You need to cancel and reschedule your visit for any reason. Call at least 2 days before your visit if you need to cancel.   - Your insurance changes before your next visit.    (We will need to get approval from your new insurance. This can take up to two weeks.)     The [facility] is opened Monday thru Friday. We are closed on weekends and holidays.   Voice mail will take your call if the center is closed. If you leave a message please  allow 24 hours for a call back during weekdays. If you leave a message on a weekend/holiday, we will call you back the next business day.

## 2024-03-05 NOTE — PROGRESS NOTES
[facility]   infusion Clinic Note   Date: 2024   Name: eNsha Mcleod  : 1955   MRN: [id number]         Reason for Visit: No chief complaint on file.         Visit Type: INFUSION       Ordered By: Skyler      Accompanied by:Self      Diagnosis: Psoriatic arthritis (CMS/[facility])       Allergies:   Allergies as of 2024 - Reviewed 2024   Allergen Reaction Noted   • Citalopram Other 10/11/2023   • Methotrexate Swelling 10/11/2023   • Trazodone Other 10/11/2023   • Adhesive tape-silicones Hives 10/11/2023   • Bleach (sodium hypochlorite) Hives, Unknown, and Itching 2016   • Chlorine Unknown and Other 10/11/2023   • Ciprofloxacin Other and Unknown 2011   • Codeine Hives and Rash 2011   • Doxycycline Nausea And Vomiting and Unknown 10/11/2023   • Latex Hives and Unknown 2011   • Morphine Hives and Rash 2011   • Penicillins Hives 10/11/2023   • Soap Unknown and Hives 10/11/2023   • Tetracycline Hives 10/11/2023   • Amitriptyline Other 2011   • Grass pollen Itching 2016   • Infliximab Unknown 10/11/2023   • Other Hives and Unknown 10/11/2023   • Rubber, unspecified Unknown 10/11/2023   • Trace metals Unknown 10/11/2023   • Weed pollen Itching 2016   • Methocarbamol Rash and Unknown 10/11/2023   • Nickel Rash 10/11/2023   • Sulfa (sulfonamide antibiotics) Nausea/vomiting and GI Upset 2011         Current Medications has a current medication list which includes the following prescription(s): acetaminophen, aspirin, atorvastatin, bupropion sr, cyclobenzaprine, dapagliflozin propanediol, docusate sodium, furosemide, gabapentin, golimumab, hydromorphone, [START ON 3/25/2024] hydromorphone, [START ON 2024] hydromorphone, leflunomide, levothyroxine, lisinopril, multivit-minerals/folic acid, naloxone, onetouch ultra test, potassium gluconate, prednisone, and tresiba flextouch u-100.       Vitals:   There were no vitals filed  "for this visit.          Infusion Pre-procedure Checklist:   - Allergies reviewed: yes   - Medications reviewed: yes       - Previous reaction to current treatment: no      Assess patient for the concerns below. Document provider notification as appropriate.  - Active or recent infection with/without current antibiotic use: no  - Recent or planned invasive dental work: no  - Recent or planned surgeries: no  - Recently received or plans to receive vaccinations: no  - Has treatment related toxicities: no  - Is pregnant:  no      Pain: 5   - Is the pain different from normal: no   - Is the pain tolerable: no   - Is your Doctor aware:  no      Labs: N/A         Fall Risk Screening:         Review Of Systems:  Review of Systems - Oncology      Infusion Readiness:   - Assessment Concerns Related to Infusion: No  - Provider notified: no      Document Below Only If Indicated:   New Patient Education:    N/A (returning patient for continuation of therapy. Ongoing education provided as needed.)        Treatment Conditions & Drug Specific Questions:    Golimumab  (SIMPONI)    (Unless otherwise specified on patient specific therapy plan):     TREATMENT CONDITIONS:  Unless otherwise specified on patient specific thearpy plan HOLD and notify provider prior to proceeding with today's infusion if patient with:  o Positive T-Spot  o Positive Hepatitis B Surface Ag    Lab Results   Component Value Date    TBSIN Negative 07/20/2022    QFG NEGATIVE 07/30/2018      No results found for: \"HAGCN\", \"HEPBSURABI\", \"HBSAG\", \"XHAGF\", \"HEPBSAG\", \"EXTHEPBSAG\", \"NONUHSWGH\"   No results found for: \"NONUHFIRE\", \"NONUHSWGH\", \"NONUHFISH\", \"EXTHEPBSAG\"  No results found for: \"HEPATOT\", \"HEPAIGM\", \"HEPBCIGM\", \"HEPBCAB\", \"HBEAG\", \"HEPCAB\"   No results found for: \"HEPCAB\"  No results found for: \"HBCTI\", \"HEPBCAB\"    Labs reviewed and patient meets treatment conditions? Yes    DRUG SPECIFIC QUESTIONS:   - Up to date on all immunizations? " Yes    Immunization History   Administered Date(s) Administered   • Flu vaccine (IIV4), preservative free *Check age/dose* 09/28/2013, 09/14/2016, 10/06/2020   • Influenza, High Dose Seasonal, Preservative Free 09/04/2019, 09/09/2023   • Influenza, seasonal, injectable 10/09/2014, 10/26/2015   • Novel influenza-H1N1-09, preservative-free 12/23/2009   • Pfizer Gray Cap SARS-CoV-2 04/25/2022   • Pfizer Purple Cap SARS-CoV-2 01/17/2021, 03/27/2021, 08/20/2021   • Pneumococcal conjugate vaccine, 20-valent (PREVNAR 20) 05/27/2022   • Pneumococcal polysaccharide vaccine, 23-valent, age 2 years and older (PNEUMOVAX 23) 10/13/2014, 01/13/2017   • Tdap vaccine, age 7 year and older (BOOSTRIX, ADACEL) 05/27/2022   • Zoster, live 11/03/2012         - Any history of heart failure? No     ( Simponi may cause exacerbation of heart failure)     - Any history of cancer? No    (Box Warning: Malignancy)      REMINDER:  WEIGHT BASED DRUG    Recommended Vitals/Observation:  Vitals: Take vital signs prior to starting infusion, at infusion conclusion and as needed.   Observation: No observation.        Weight Based Drug Calculations:    WEIGHT BASED DRUGS: NOT APPLICABLE / FLAT DOSE          Initiated By: Gunjan Arias RN   Time: 8:50 AM     We administered golimumab (Simponi Aria) 137.5 mg in sodium chloride 0.9% 100 mL IV.

## 2024-03-07 ENCOUNTER — TELEPHONE (OUTPATIENT)
Dept: PRIMARY CARE | Facility: CLINIC | Age: 69
End: 2024-03-07
Payer: MEDICARE

## 2024-03-07 NOTE — TELEPHONE ENCOUNTER
Per [facility] staff: Patient left message on voicemail stating, “I am confirming my visit with Angela for tomorrow”. Phoned patient in follow up, visit confirmed and covid screening completed.

## 2024-03-08 ENCOUNTER — OFFICE VISIT (OUTPATIENT)
Dept: PRIMARY CARE | Facility: CLINIC | Age: 69
End: 2024-03-08
Payer: MEDICARE

## 2024-03-08 VITALS
SYSTOLIC BLOOD PRESSURE: 100 MMHG | RESPIRATION RATE: 16 BRPM | OXYGEN SATURATION: 98 % | DIASTOLIC BLOOD PRESSURE: 58 MMHG | BODY MASS INDEX: 33.44 KG/M2 | TEMPERATURE: 99.1 F | WEIGHT: 160 LBS | HEART RATE: 82 BPM

## 2024-03-08 DIAGNOSIS — L40.50 PSORIATIC ARTHRITIS (MULTI): ICD-10-CM

## 2024-03-08 DIAGNOSIS — E11.9 TYPE 2 DIABETES MELLITUS WITHOUT COMPLICATION, WITH LONG-TERM CURRENT USE OF INSULIN (MULTI): Primary | ICD-10-CM

## 2024-03-08 DIAGNOSIS — E55.9 VITAMIN D DEFICIENCY: ICD-10-CM

## 2024-03-08 DIAGNOSIS — Z79.4 TYPE 2 DIABETES MELLITUS WITHOUT COMPLICATION, WITH LONG-TERM CURRENT USE OF INSULIN (MULTI): Primary | ICD-10-CM

## 2024-03-08 DIAGNOSIS — E78.2 MIXED HYPERLIPIDEMIA: ICD-10-CM

## 2024-03-08 DIAGNOSIS — G89.29 CHRONIC LOW BACK PAIN, UNSPECIFIED BACK PAIN LATERALITY, UNSPECIFIED WHETHER SCIATICA PRESENT: ICD-10-CM

## 2024-03-08 DIAGNOSIS — M54.50 CHRONIC LOW BACK PAIN, UNSPECIFIED BACK PAIN LATERALITY, UNSPECIFIED WHETHER SCIATICA PRESENT: ICD-10-CM

## 2024-03-08 DIAGNOSIS — E06.3 AUTOIMMUNE THYROIDITIS: ICD-10-CM

## 2024-03-08 LAB
25(OH)D3 SERPL-MCNC: 55 NG/ML (ref 31–100)
ALBUMIN SERPL-MCNC: 4.7 G/DL (ref 3.5–5)
ALP BLD-CCNC: 75 U/L (ref 35–125)
ALT SERPL-CCNC: 18 U/L (ref 5–40)
ANION GAP SERPL CALC-SCNC: 14 MMOL/L
AST SERPL-CCNC: 22 U/L (ref 5–40)
BILIRUB SERPL-MCNC: 0.3 MG/DL (ref 0.1–1.2)
BUN SERPL-MCNC: 63 MG/DL (ref 8–25)
CALCIUM SERPL-MCNC: 9.9 MG/DL (ref 8.5–10.4)
CHLORIDE SERPL-SCNC: 91 MMOL/L (ref 97–107)
CHOLEST SERPL-MCNC: 191 MG/DL (ref 133–200)
CHOLEST/HDLC SERPL: 2.1 {RATIO}
CO2 SERPL-SCNC: 27 MMOL/L (ref 24–31)
CREAT SERPL-MCNC: 1.4 MG/DL (ref 0.4–1.6)
EGFRCR SERPLBLD CKD-EPI 2021: 41 ML/MIN/1.73M*2
ERYTHROCYTE [DISTWIDTH] IN BLOOD BY AUTOMATED COUNT: 12.9 % (ref 11.5–14.5)
EST. AVERAGE GLUCOSE BLD GHB EST-MCNC: 120 MG/DL
GLUCOSE SERPL-MCNC: 116 MG/DL (ref 65–99)
HBA1C MFR BLD: 5.8 %
HCT VFR BLD AUTO: 38.3 % (ref 36–46)
HDLC SERPL-MCNC: 93 MG/DL
HGB BLD-MCNC: 12.3 G/DL (ref 12–16)
LDLC SERPL CALC-MCNC: 74 MG/DL (ref 65–130)
MCH RBC QN AUTO: 30.4 PG (ref 26–34)
MCHC RBC AUTO-ENTMCNC: 32.1 G/DL (ref 32–36)
MCV RBC AUTO: 95 FL (ref 80–100)
NRBC BLD-RTO: 0 /100 WBCS (ref 0–0)
PLATELET # BLD AUTO: 217 X10*3/UL (ref 150–450)
POTASSIUM SERPL-SCNC: 4.9 MMOL/L (ref 3.4–5.1)
PROT SERPL-MCNC: 7.1 G/DL (ref 5.9–7.9)
RBC # BLD AUTO: 4.04 X10*6/UL (ref 4–5.2)
SODIUM SERPL-SCNC: 132 MMOL/L (ref 133–145)
T4 FREE SERPL-MCNC: 1 NG/DL (ref 0.9–1.7)
TRIGL SERPL-MCNC: 122 MG/DL (ref 40–150)
TSH SERPL DL<=0.05 MIU/L-ACNC: 4.62 MIU/L (ref 0.27–4.2)
WBC # BLD AUTO: 5.1 X10*3/UL (ref 4.4–11.3)

## 2024-03-08 PROCEDURE — 1125F AMNT PAIN NOTED PAIN PRSNT: CPT | Performed by: NURSE PRACTITIONER

## 2024-03-08 PROCEDURE — 3074F SYST BP LT 130 MM HG: CPT | Performed by: NURSE PRACTITIONER

## 2024-03-08 PROCEDURE — 84443 ASSAY THYROID STIM HORMONE: CPT

## 2024-03-08 PROCEDURE — 85027 COMPLETE CBC AUTOMATED: CPT

## 2024-03-08 PROCEDURE — 1160F RVW MEDS BY RX/DR IN RCRD: CPT | Performed by: NURSE PRACTITIONER

## 2024-03-08 PROCEDURE — 84439 ASSAY OF FREE THYROXINE: CPT

## 2024-03-08 PROCEDURE — 83036 HEMOGLOBIN GLYCOSYLATED A1C: CPT

## 2024-03-08 PROCEDURE — 3078F DIAST BP <80 MM HG: CPT | Performed by: NURSE PRACTITIONER

## 2024-03-08 PROCEDURE — 1159F MED LIST DOCD IN RCRD: CPT | Performed by: NURSE PRACTITIONER

## 2024-03-08 PROCEDURE — 36415 COLL VENOUS BLD VENIPUNCTURE: CPT | Performed by: NURSE PRACTITIONER

## 2024-03-08 PROCEDURE — 36415 COLL VENOUS BLD VENIPUNCTURE: CPT

## 2024-03-08 PROCEDURE — 80053 COMPREHEN METABOLIC PANEL: CPT

## 2024-03-08 PROCEDURE — 1036F TOBACCO NON-USER: CPT | Performed by: NURSE PRACTITIONER

## 2024-03-08 PROCEDURE — 82306 VITAMIN D 25 HYDROXY: CPT

## 2024-03-08 PROCEDURE — 4010F ACE/ARB THERAPY RXD/TAKEN: CPT | Performed by: NURSE PRACTITIONER

## 2024-03-08 PROCEDURE — 99349 HOME/RES VST EST MOD MDM 40: CPT | Performed by: NURSE PRACTITIONER

## 2024-03-08 PROCEDURE — 80061 LIPID PANEL: CPT

## 2024-03-08 RX ORDER — PREDNISONE 10 MG/1
10 TABLET ORAL DAILY
Qty: 90 TABLET | Refills: 1 | Status: SHIPPED | OUTPATIENT
Start: 2024-03-08 | End: 2024-09-04

## 2024-03-08 ASSESSMENT — ENCOUNTER SYMPTOMS
JOINT SWELLING: 1
RESPIRATORY NEGATIVE: 1
ALLERGIC/IMMUNOLOGIC NEGATIVE: 1
HEADACHES: 1
HEMATOLOGIC/LYMPHATIC NEGATIVE: 1
NECK PAIN: 1
ARTHRALGIAS: 1
PSYCHIATRIC NEGATIVE: 1
NAUSEA: 1
EYES NEGATIVE: 1
WEAKNESS: 1
MYALGIAS: 1
FATIGUE: 1
ENDOCRINE NEGATIVE: 1
NECK STIFFNESS: 1
BACK PAIN: 1

## 2024-03-08 ASSESSMENT — PAIN SCALES - GENERAL: PAINLEVEL: 10-WORST PAIN EVER

## 2024-03-08 NOTE — PROGRESS NOTES
Subjective   Patient ID: Nesha Mcleod is a 69 y.o. female who is being seen for routine 2 month hose calls visit.     HPI Pt seen in single family apartment unaccompanied. PMHx: HTN, HLD, DM, GERD, Anemia, Anxiety, OA, Psoriatic artritis, DDD, Altered mobilty. Pt seen sitting in winged back chair [place] legs dependent. Pt with c/o severe hip and back pain. Has an appointment tomas [facility] but can't get in until April 10th. Pt saw pain management on 2/26 and was started on Dilaudid for chronic pain as nothing else was working. Pt having a flare up of hip and back pain which the Diluadid is not helping for the pain. Pt rating pain 10/10 at present. Pain limiting mobility. Pt ambulates with Rolator. Admits to fall a week and a half ago where she was ambulating with Rolator and leg gave out and she slid to the floor. Pt denied injury with fall.     Blood sugars ranging in the 180's mainly when not taking prednisone and 250-275 when taking Prednisone. Pt wanting to try something different from Farxiga due to cost. Pt carb counting for meals and follows diabetic diet.     Pt received Simponi infusion on 3/5/24 and had no complications. She receives infusion every 8 weeks.     Pt looking to move when lease is up due to cost of apartment and the fact that they are probably raising the rent. Pt called [place] [facility] who provided pt with a list of apartments but none seemed to be candidates for patient.     Pt denies fevers, chills, night sweats. Or dizziness. Complains of occasional headaches. Talha problems chewing or swallowing. Pt denies Sob. Cough, CP or palpitations. Pt denies V/D or constipation. Admits to nausea today. Reports bowel movement every couple days. Denies urinary symptoms. Pt with c/o of all over joint, back and neck pain. Pt with c/o of extreme fatigue. Pt reports appetite as okay and sleep as fair. Pt endorses napping during the day. Pt with no other complaints or concerns.   Home Visit  medically necessary due to: pt has chronic condition that makes access to a traditional office visit very difficult, illness or condition that results in activity limitation or restriction that impacts the ability to leave home such as; unsteady gait/ poor condition.    Review of Systems   Constitutional:  Positive for fatigue.   HENT: Negative.     Eyes: Negative.    Respiratory: Negative.     Cardiovascular:  Positive for leg swelling.   Gastrointestinal:  Positive for nausea.   Endocrine: Negative.    Genitourinary: Negative.    Musculoskeletal:  Positive for arthralgias, back pain, gait problem, joint swelling, myalgias, neck pain and neck stiffness.   Skin: Negative.    Allergic/Immunologic: Negative.    Neurological:  Positive for weakness and headaches.   Hematological: Negative.    Psychiatric/Behavioral: Negative.         Objective   /58 (BP Location: Left arm, Patient Position: Sitting)   Pulse 82   Temp 37.3 °C (99.1 °F) (Temporal)   Resp 16   Wt 72.6 kg (160 lb)   SpO2 98%   BMI 33.44 kg/m²     Current Outpatient Medications:     acetaminophen (Tylenol) 325 mg tablet, Take 2 tablets (650 mg) by mouth every 4 hours if needed for mild pain (1 - 3), headaches or fever (temp greater than 38.0 C). THIS ORDER IS SUPERSEDED BY ANY OTHER MEDICATION ORDER FOR MILD PAIN (1-3). MAY BE USED FOR A HIGHER PAIN SCORE IF PATIENT REQUESTS, Disp: , Rfl:     aspirin 81 mg chewable tablet, Chew 1 tablet (81 mg) once daily. HOLD IF GIVEN IN LAST 24 HOURS OR IF HISTORY OF HYPERSENSITIVITY., Disp: , Rfl:     atorvastatin (Lipitor) 40 mg tablet, Take 1 tablet (40 mg) by mouth once daily., Disp: 90 tablet, Rfl: 2    buPROPion SR (Wellbutrin SR) 100 mg 12 hr tablet, Take 1 tablet (100 mg) by mouth once daily. Do not crush, chew, or split., Disp: 90 tablet, Rfl: 3    cyclobenzaprine (Flexeril) 10 mg tablet, Take 1 tablet (10 mg) by mouth 2 times a day as needed for muscle spasms., Disp: 60 tablet, Rfl: 2     dapagliflozin propanediol (Farxiga) 10 mg, Take 1 tablet (10 mg) by mouth once daily., Disp: 90 tablet, Rfl: 3    docusate sodium (Dulcolax Stool Softener, dss,) 100 mg capsule, Take 1 capsule (100 mg) by mouth 2 times a day as needed., Disp: , Rfl:     furosemide (Lasix) 40 mg tablet, Take 1 tablet (40 mg) by mouth once daily., Disp: 90 tablet, Rfl: 3    gabapentin (Neurontin) 300 mg capsule, Take 1 capsule (300 mg) by mouth 2 times a day., Disp: 60 capsule, Rfl: 2    golimumab (Simponi) 50 mg/0.5 mL pen injector, Inject under the skin once daily., Disp: , Rfl:     HYDROmorphone (Dilaudid) 2 mg tablet, Take 1 tablet (2 mg) by mouth 3 times a day as needed for moderate pain (4 - 6) or severe pain (7 - 10) for up to 28 days., Disp: 84 tablet, Rfl: 0    leflunomide (Arava) 10 mg tablet, Take 1 tablet (10 mg) by mouth once daily., Disp: 90 tablet, Rfl: 3    levothyroxine (Synthroid, Levoxyl) 75 mcg tablet, Take 1 tablet (75 mcg) by mouth once daily in the morning. Take before meals., Disp: 90 tablet, Rfl: 3    lisinopril 20 mg tablet, Take 1 tablet (20 mg) by mouth once daily., Disp: 90 tablet, Rfl: 3    multivit-minerals/folic acid (CENTRUM ADULTS ORAL), Take by mouth once daily., Disp: , Rfl:     naloxone (Narcan) 4 mg/0.1 mL nasal spray, USE AS DIRECTED, Disp: , Rfl:     OneTouch Ultra Test strip, once daily., Disp: , Rfl:     potassium gluconate 595 mg (99 mg) tablet, Take 1 tablet by mouth once daily., Disp: , Rfl:     predniSONE (Deltasone) 10 mg tablet, Take 1 tablet (10 mg) by mouth once daily., Disp: 90 tablet, Rfl: 1    Tresiba FlexTouch U-100 100 unit/mL (3 mL) injection, Inject 8 Units under the skin once daily in the morning., Disp: , Rfl:     [START ON 3/25/2024] HYDROmorphone (Dilaudid) 2 mg tablet, Take 1 tablet (2 mg) by mouth 3 times a day as needed for severe pain (7 - 10) or moderate pain (4 - 6) for up to 28 days. Do not start before March 25, 2024., Disp: 84 tablet, Rfl: 0    [START ON 4/22/2024]  HYDROmorphone (Dilaudid) 2 mg tablet, Take 1 tablet (2 mg) by mouth 3 times a day as needed for severe pain (7 - 10) or moderate pain (4 - 6) for up to 28 days. Do not start before April 22, 2024., Disp: 84 tablet, Rfl: 0    Physical Exam  Constitutional:       Appearance: Normal appearance. She is normal weight.   HENT:      Head: Normocephalic and atraumatic.      Nose: Nose normal.      Mouth/Throat:      Mouth: Mucous membranes are moist.   Eyes:      Extraocular Movements: Extraocular movements intact.      Conjunctiva/sclera: Conjunctivae normal.      Pupils: Pupils are equal, round, and reactive to light.   Cardiovascular:      Rate and Rhythm: Normal rate and regular rhythm.   Pulmonary:      Effort: Pulmonary effort is normal.      Breath sounds: Normal breath sounds.   Abdominal:      General: Abdomen is flat. Bowel sounds are normal.      Palpations: Abdomen is soft.   Musculoskeletal:         General: Tenderness and deformity (back) present.      Cervical back: Normal range of motion and neck supple.   Skin:     General: Skin is warm and dry.      Capillary Refill: Capillary refill takes less than 2 seconds.   Neurological:      Mental Status: She is alert and oriented to person, place, and time.      Motor: Weakness present.      Gait: Gait abnormal.   Psychiatric:         Mood and Affect: Mood normal.         Behavior: Behavior normal.         Thought Content: Thought content normal.         Judgment: Judgment normal.       Patient Active Problem List   Diagnosis    Psoriatic arthritis (CMS/HCC)    Overactive bladder    Myalgia    Soft tissue mass    Neurogenic claudication due to lumbar spinal stenosis    Acquired partial lipodystrophy    Acute cystitis without hematuria    Acute renal failure syndrome (CMS/HCC)    ALT (SGPT) level raised    Altered mental status    Anal sphincter incontinence    Anxiety    Arthritis    Arthritis of neck    Autoimmune thyroiditis    Blepharitis    Blurred vision,  bilateral    Cataract, nuclear sclerotic, both eyes    Cellulitis    Cervical radicular pain    Pain, radicular, lumbar    Choroidal nevus, left eye    Chronic intractable pain    Chronic low back pain    Clouded consciousness    Colovaginal fistula    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    Consecutive exotropia    Change in bowel habits    Constipation    Contact dermatitis    Controlled insulin dependent type 1 diabetes mellitus (CMS/HCC)    Type 2 diabetes mellitus (CMS/[facility])    DDD (degenerative disc disease), thoracic    Dermatochalasis    Difficulty walking    Disorder of refraction    Disturbance, visual, subjective    Diverticulosis of colon    Dry eyes    Early cataract    Elevated creatine kinase    Essential (primary) hypertension    Fecal soiling due to fecal incontinence    Fistula involving female genital tract    Fracture of cervical vertebra (CMS/[facility])    Gastroesophageal reflux disease    Generalized osteoarthritis of multiple sites    Hemorrhoids without complication    Iron deficiency anemia    Leukopenia    Lumbar radiculitis    Mixed hyperlipidemia    Myofascial pain    Numbness    Obesity with body mass index 30 or greater    Palsy of conjugate gaze    Poor venous access    Incisional hernia    Postlaminectomy syndrome, lumbar region    Pseudophakia of both eyes    Psoriasis    Recurrent urinary tract infection    Retinopathy, background, nonproliferative, mild    Skin excoriation    Swelling of upper arm    Vaginal disorder    Weakness generalized     Preventative Medicine Counseling: Medication Education: Education: Current medication list reviewed and discussed, understanding, patient expressed understanding, adherence: No barriers to adherence identified.      Assessment/Plan   Diagnoses and all orders for this visit:  Type 2 diabetes mellitus without complication, with long-term current use of insulin (CMS/[facility])  Comments:  Continue to check blood sugars daily  Continue Farxiga and Tresiba,  will look into changing Farxiga to something more affordable  Check A1c  Orders:  -     Hemoglobin A1c; Future  -     CBC; Future  -     Comprehensive metabolic panel; Future  Psoriatic arthritis (CMS/HCC)  Comments:  refilled Prednisone 10 mg po daily as needed  Continue Simponi infusions.  Orders:  -     predniSONE (Deltasone) 10 mg tablet; Take 1 tablet (10 mg) by mouth once daily.  Mixed hyperlipidemia  Comments:  Check lipid panel  Continue atorvastatin  Orders:  -     Lipid panel; Future  Autoimmune thyroiditis  Comments:  Continue levothyroxine  Check TSH  Orders:  -     Tsh With Reflex To Free T4 If Abnormal; Future  Vitamin D deficiency  Comments:  Check Vitamin D level  Orders:  -     Vitamin D 25-Hydroxy,Total (for eval of Vitamin D levels); Future  Chronic low back pain, unspecified back pain laterality, unspecified whether sciatica present  Comments:  Continue to follow with pain management  Continue Diluadid for pain.    Labs drawn without complication.  Follow up in 2 months or prn  Angela Dyer, APRN-CNP

## 2024-03-11 ENCOUNTER — TELEPHONE (OUTPATIENT)
Dept: PRIMARY CARE | Facility: CLINIC | Age: 69
End: 2024-03-11
Payer: MEDICARE

## 2024-03-11 DIAGNOSIS — M25.551 RIGHT HIP PAIN: Primary | ICD-10-CM

## 2024-03-11 NOTE — TELEPHONE ENCOUNTER
Result Communication    Resulted Orders   Hemoglobin A1c   Result Value Ref Range    Hemoglobin A1C 5.8 (H) See below %    Estimated Average Glucose 120 Not Established mg/dL    Narrative    Diagnosis of Diabetes-Adults  Non-Diabetic: < or = 5.6%  Increased risk for developing diabetes: 5.7-6.4%  Diagnostic of diabetes: > or = 6.5%    Monitoring of Diabetes  Age (y)....................... Therapeutic Goal (%)  Adults: >18.........................<7.0  Pediatrics: 13-18...................<7.5  Pediatrics: 7-12....................<8.0  Pediatrics: 0-6..................... 7.5-8.5    American Diabetes Association. Diabetes Care 33(S1), Jan 2010       CBC   Result Value Ref Range    WBC 5.1 4.4 - 11.3 x10*3/uL    nRBC 0.0 0.0 - 0.0 /100 WBCs    RBC 4.04 4.00 - 5.20 x10*6/uL    Hemoglobin 12.3 12.0 - 16.0 g/dL    Hematocrit 38.3 36.0 - 46.0 %    MCV 95 80 - 100 fL    MCH 30.4 26.0 - 34.0 pg    MCHC 32.1 32.0 - 36.0 g/dL    RDW 12.9 11.5 - 14.5 %    Platelets 217 150 - 450 x10*3/uL   Comprehensive metabolic panel   Result Value Ref Range    Glucose 116 (H) 65 - 99 mg/dL    Sodium 132 (L) 133 - 145 mmol/L    Potassium 4.9 3.4 - 5.1 mmol/L    Chloride 91 (L) 97 - 107 mmol/L    Bicarbonate 27 24 - 31 mmol/L    Urea Nitrogen 63 (H) 8 - 25 mg/dL    Creatinine 1.40 0.40 - 1.60 mg/dL    eGFR 41 (L) >60 mL/min/1.73m*2      Comment:      Calculations of estimated GFR are performed using the 2021 CKD-EPI Study Refit equation without the race variable for the IDMS-Traceable creatinine methods.  https://jasn.asnjournals.org/content/early/2021/09/22/ASN.6414952980    Calcium 9.9 8.5 - 10.4 mg/dL    Albumin 4.7 3.5 - 5.0 g/dL    Alkaline Phosphatase 75 35 - 125 U/L    Total Protein 7.1 5.9 - 7.9 g/dL    AST 22 5 - 40 U/L    Bilirubin, Total 0.3 0.1 - 1.2 mg/dL    ALT 18 5 - 40 U/L    Anion Gap 14 <=19 mmol/L   Vitamin D 25-Hydroxy,Total (for eval of Vitamin D levels)   Result Value Ref Range    Vitamin D, 25-Hydroxy, Total 55 31 - 100  "ng/mL   Tsh With Reflex To Free T4 If Abnormal   Result Value Ref Range    Thyroid Stimulating Hormone 4.62 (H) 0.27 - 4.20 mIU/L    Narrative    TSH testing is performed using different testing methodology at [facility] than at other [facility]. Direct result comparisons should only be made within the same method.     Lipid panel   Result Value Ref Range    Cholesterol 191 133 - 200 mg/dL    HDL-Cholesterol 93.0 >50.0 mg/dL      Comment:      National Cholesterol Education Program (NCFP) guidelines:  <40 mg/dL: Low HDL-cholesterol (major risk factor for CHD)  >60 mg/dL: High HDL-cholesterol (\"negative\"risk factor for CHD)  HDL-cholesterol is affected by a number of factors (e.g., smoking, exercise, hormones, sex and age).      Cholesterol/HDL Ratio 2.1 SEE COMMENT      Comment:      According to the American Heart Association, the goal is to maintain the total Cholesterol/HDL ratio at 5-to-1, or lower, with an optimum ratio of 3.5-to-1.    LDL Calculated 74 65 - 130 mg/dL    Triglycerides 122 40 - 150 mg/dL   Thyroxine, Free   Result Value Ref Range    Thyroxine, Free 1.00 0.90 - 1.70 ng/dL       1:04 PM      Results were successfully communicated with the patient and they acknowledged their understanding.  Pt going to stop Farxiga when runs out and we will monitor pts A1c and increase Tresiba if necessary. Pt instructed to slightly decrease fluid intake to balance hyponatremia. Pt agreeable with plan.    "

## 2024-03-11 NOTE — TELEPHONE ENCOUNTER
Patient calls to report pain in right hip not getting any better, been 2 weeks today.  Requesting order for xrays be entered so her family member can take her for xrays.  Please Advise.

## 2024-03-12 ENCOUNTER — HOSPITAL ENCOUNTER (OUTPATIENT)
Dept: RADIOLOGY | Facility: CLINIC | Age: 69
Discharge: HOME | End: 2024-03-12
Payer: MEDICARE

## 2024-03-12 DIAGNOSIS — M25.551 RIGHT HIP PAIN: ICD-10-CM

## 2024-03-12 PROCEDURE — 73502 X-RAY EXAM HIP UNI 2-3 VIEWS: CPT | Mod: RIGHT SIDE | Performed by: RADIOLOGY

## 2024-03-12 PROCEDURE — 73502 X-RAY EXAM HIP UNI 2-3 VIEWS: CPT | Mod: RT

## 2024-03-13 ENCOUNTER — TELEPHONE (OUTPATIENT)
Dept: PRIMARY CARE | Facility: CLINIC | Age: 69
End: 2024-03-13
Payer: MEDICARE

## 2024-03-13 NOTE — TELEPHONE ENCOUNTER
Spoke with pt regarding Xray results. No acute findings. Pt verbalizes understanding.   Angela Dyer, APRN-CNP

## 2024-04-02 ENCOUNTER — TELEPHONE (OUTPATIENT)
Dept: PRIMARY CARE | Facility: CLINIC | Age: 69
End: 2024-04-02
Payer: MEDICARE

## 2024-04-02 DIAGNOSIS — Z79.4 TYPE 2 DIABETES MELLITUS WITHOUT COMPLICATION, WITH LONG-TERM CURRENT USE OF INSULIN (MULTI): Primary | ICD-10-CM

## 2024-04-02 DIAGNOSIS — E11.9 TYPE 2 DIABETES MELLITUS WITHOUT COMPLICATION, WITH LONG-TERM CURRENT USE OF INSULIN (MULTI): Primary | ICD-10-CM

## 2024-04-02 RX ORDER — METFORMIN HYDROCHLORIDE 500 MG/1
500 TABLET ORAL
Qty: 180 TABLET | Refills: 3 | Status: SHIPPED | OUTPATIENT
Start: 2024-04-02 | End: 2025-03-28

## 2024-04-02 RX ORDER — METFORMIN HYDROCHLORIDE 500 MG/1
500 TABLET ORAL
Qty: 28 TABLET | Refills: 0 | Status: SHIPPED | OUTPATIENT
Start: 2024-04-02 | End: 2024-05-10

## 2024-04-02 NOTE — TELEPHONE ENCOUNTER
500 mg twice, metformin.  90 days [facility] with short cycle to local [place], [place] as she will be out of medication in 5 days.  Patient states this medication has never been discontinued by any provider and she takes it daily.  Please Advise regarding refill.

## 2024-04-19 DIAGNOSIS — Z79.4 TYPE 2 DIABETES MELLITUS WITHOUT COMPLICATION, WITH LONG-TERM CURRENT USE OF INSULIN (MULTI): ICD-10-CM

## 2024-04-19 DIAGNOSIS — E11.9 TYPE 2 DIABETES MELLITUS WITHOUT COMPLICATION, WITH LONG-TERM CURRENT USE OF INSULIN (MULTI): ICD-10-CM

## 2024-04-19 RX ORDER — INSULIN DEGLUDEC 100 U/ML
8 INJECTION, SOLUTION SUBCUTANEOUS EVERY MORNING
Qty: 9 ML | Refills: 3 | Status: SHIPPED | OUTPATIENT
Start: 2024-04-19

## 2024-04-30 ENCOUNTER — INFUSION (OUTPATIENT)
Dept: INFUSION THERAPY | Facility: CLINIC | Age: 69
End: 2024-04-30
Payer: MEDICARE

## 2024-04-30 VITALS
BODY MASS INDEX: 33.44 KG/M2 | DIASTOLIC BLOOD PRESSURE: 58 MMHG | HEART RATE: 85 BPM | RESPIRATION RATE: 18 BRPM | SYSTOLIC BLOOD PRESSURE: 85 MMHG | TEMPERATURE: 96.4 F | WEIGHT: 160 LBS | OXYGEN SATURATION: 97 %

## 2024-04-30 DIAGNOSIS — L40.50 PSORIATIC ARTHRITIS (MULTI): ICD-10-CM

## 2024-04-30 LAB
ALBUMIN SERPL BCP-MCNC: 4.3 G/DL (ref 3.4–5)
ALP SERPL-CCNC: 58 U/L (ref 33–136)
ALT SERPL W P-5'-P-CCNC: 17 U/L (ref 7–45)
ANION GAP SERPL CALC-SCNC: 14 MMOL/L (ref 10–20)
AST SERPL W P-5'-P-CCNC: 20 U/L (ref 9–39)
BASOPHILS # BLD AUTO: 0.06 X10*3/UL (ref 0–0.1)
BASOPHILS NFR BLD AUTO: 1.2 %
BILIRUB SERPL-MCNC: 0.4 MG/DL (ref 0–1.2)
BUN SERPL-MCNC: 49 MG/DL (ref 6–23)
CALCIUM SERPL-MCNC: 9.5 MG/DL (ref 8.6–10.3)
CHLORIDE SERPL-SCNC: 100 MMOL/L (ref 98–107)
CO2 SERPL-SCNC: 29 MMOL/L (ref 21–32)
CREAT SERPL-MCNC: 1.29 MG/DL (ref 0.5–1.05)
EGFRCR SERPLBLD CKD-EPI 2021: 45 ML/MIN/1.73M*2
EOSINOPHIL # BLD AUTO: 0.48 X10*3/UL (ref 0–0.7)
EOSINOPHIL NFR BLD AUTO: 9.6 %
ERYTHROCYTE [DISTWIDTH] IN BLOOD BY AUTOMATED COUNT: 13.5 % (ref 11.5–14.5)
GLUCOSE SERPL-MCNC: 152 MG/DL (ref 74–99)
HCT VFR BLD AUTO: 36.6 % (ref 36–46)
HGB BLD-MCNC: 11.4 G/DL (ref 12–16)
IMM GRANULOCYTES # BLD AUTO: 0.01 X10*3/UL (ref 0–0.7)
IMM GRANULOCYTES NFR BLD AUTO: 0.2 % (ref 0–0.9)
LYMPHOCYTES # BLD AUTO: 1.29 X10*3/UL (ref 1.2–4.8)
LYMPHOCYTES NFR BLD AUTO: 25.7 %
MCH RBC QN AUTO: 30.7 PG (ref 26–34)
MCHC RBC AUTO-ENTMCNC: 31.1 G/DL (ref 32–36)
MCV RBC AUTO: 99 FL (ref 80–100)
MONOCYTES # BLD AUTO: 0.64 X10*3/UL (ref 0.1–1)
MONOCYTES NFR BLD AUTO: 12.7 %
NEUTROPHILS # BLD AUTO: 2.54 X10*3/UL (ref 1.2–7.7)
NEUTROPHILS NFR BLD AUTO: 50.6 %
NRBC BLD-RTO: 0 /100 WBCS (ref 0–0)
PLATELET # BLD AUTO: 197 X10*3/UL (ref 150–450)
POTASSIUM SERPL-SCNC: 4.5 MMOL/L (ref 3.5–5.3)
PROT SERPL-MCNC: 6.5 G/DL (ref 6.4–8.2)
RBC # BLD AUTO: 3.71 X10*6/UL (ref 4–5.2)
SODIUM SERPL-SCNC: 138 MMOL/L (ref 136–145)
WBC # BLD AUTO: 5 X10*3/UL (ref 4.4–11.3)

## 2024-04-30 PROCEDURE — 36415 COLL VENOUS BLD VENIPUNCTURE: CPT

## 2024-04-30 PROCEDURE — 2500000004 HC RX 250 GENERAL PHARMACY W/ HCPCS (ALT 636 FOR OP/ED): Performed by: INTERNAL MEDICINE

## 2024-04-30 PROCEDURE — 84075 ASSAY ALKALINE PHOSPHATASE: CPT | Performed by: INTERNAL MEDICINE

## 2024-04-30 PROCEDURE — 85025 COMPLETE CBC W/AUTO DIFF WBC: CPT | Performed by: INTERNAL MEDICINE

## 2024-04-30 PROCEDURE — 96365 THER/PROPH/DIAG IV INF INIT: CPT | Mod: INF | Performed by: INTERNAL MEDICINE

## 2024-04-30 RX ORDER — DIPHENHYDRAMINE HYDROCHLORIDE 50 MG/ML
50 INJECTION INTRAMUSCULAR; INTRAVENOUS AS NEEDED
Status: CANCELLED | OUTPATIENT
Start: 2024-05-12

## 2024-04-30 RX ORDER — FAMOTIDINE 10 MG/ML
20 INJECTION INTRAVENOUS ONCE AS NEEDED
Status: CANCELLED | OUTPATIENT
Start: 2024-05-12

## 2024-04-30 RX ORDER — EPINEPHRINE 0.3 MG/.3ML
0.3 INJECTION SUBCUTANEOUS EVERY 5 MIN PRN
Status: CANCELLED | OUTPATIENT
Start: 2024-05-12

## 2024-04-30 RX ORDER — FAMOTIDINE 10 MG/ML
20 INJECTION INTRAVENOUS ONCE AS NEEDED
OUTPATIENT
Start: 2024-05-12

## 2024-04-30 RX ORDER — DIPHENHYDRAMINE HYDROCHLORIDE 50 MG/ML
50 INJECTION INTRAMUSCULAR; INTRAVENOUS AS NEEDED
OUTPATIENT
Start: 2024-05-12

## 2024-04-30 RX ORDER — ALBUTEROL SULFATE 0.83 MG/ML
3 SOLUTION RESPIRATORY (INHALATION) AS NEEDED
OUTPATIENT
Start: 2024-05-12

## 2024-04-30 RX ORDER — EPINEPHRINE 0.3 MG/.3ML
0.3 INJECTION SUBCUTANEOUS EVERY 5 MIN PRN
OUTPATIENT
Start: 2024-05-12

## 2024-04-30 RX ORDER — ALBUTEROL SULFATE 0.83 MG/ML
3 SOLUTION RESPIRATORY (INHALATION) AS NEEDED
Status: CANCELLED | OUTPATIENT
Start: 2024-05-12

## 2024-04-30 RX ADMIN — GOLIMUMAB 137.5 MG: 50 SOLUTION INTRAVENOUS at 09:17

## 2024-04-30 NOTE — PATIENT INSTRUCTIONS
Today :We administered golimumab (Simponi Aria) 137.5 mg in sodium chloride 0.9% 100 mL IV.     For:   1. Psoriatic arthritis (Multi)         Your next appointment is due in:  6/25/24        Please read the  Medication Guide that was given to you and reviewed during todays visit.     (Tell all doctors including dentists that you are taking this medication)     Go to the emergency room or call 911 if:  -You have signs of allergic reaction:   -Rash, hives, itching.   -Swollen, blistered, peeling skin.   -Swelling of face, lips, mouth, tongue or throat.   -Tightness of chest, trouble breathing, swallowing or talking     Call your doctor:  - If IV / injection site gets red, warm, swollen, itchy or leaks fluid or pus.     (Leave dressing on your IV site for at least 2 hours and keep area clean and dry  - If you get sick or have symptoms of infection or are not feeling well for any reason.    (Wash your hands often, stay away from people who are sick)  - If you have side effects from your medication that do not go away or are bothersome.     (Refer to the teaching your nurse gave you for side effects to call your doctor about)    - Common side effects may include:  stuffy nose, headache, feeling tired, muscle aches, upset stomach  - Before receiving any vaccines     - Call the [facility] at   If:  - You get sick, are on antibiotics, have had a recent vaccine, have surgery or dental work and your doctor wants your visit rescheduled.  - You need to cancel and reschedule your visit for any reason. Call at least 2 days before your visit if you need to cancel.   - Your insurance changes before your next visit.    (We will need to get approval from your new insurance. This can take up to two weeks.)     The [facility] is opened Monday thru Friday. We are closed on weekends and holidays.   Voice mail will take your call if the center is closed. If you leave a message please allow 24 hours for a  call back during weekdays. If you leave a message on a weekend/holiday, we will call you back the next business day.

## 2024-04-30 NOTE — PROGRESS NOTES
[facility]   Infusion Clinic Note   Date: 2024   Name: Nesha Mcleod  : 1955   MRN: [id number]         Reason for Visit: No chief complaint on file.         Visit Type: INFUSION       Ordered By: Lumapas      Accompanied by:Self      Diagnosis: Psoriatic arthritis (Multi)       Allergies:   Allergies as of 2024 - Reviewed 2024   Allergen Reaction Noted   • Citalopram Other 10/11/2023   • Methotrexate Swelling 10/11/2023   • Trazodone Other 10/11/2023   • Adhesive tape-silicones Hives 10/11/2023   • Bleach (sodium hypochlorite) Hives, Unknown, and Itching 2016   • Chlorine Unknown and Other 10/11/2023   • Ciprofloxacin Other and Unknown 2011   • Codeine Hives and Rash 2011   • Doxycycline Nausea And Vomiting and Unknown 10/11/2023   • Latex Hives and Unknown 2011   • Morphine Hives and Rash 2011   • Penicillins Hives 10/11/2023   • Soap Unknown and Hives 10/11/2023   • Tetracycline Hives 10/11/2023   • Amitriptyline Other 2011   • Grass pollen Itching 2016   • Infliximab Unknown 10/11/2023   • Other Hives and Unknown 10/11/2023   • Rubber, unspecified Unknown 10/11/2023   • Trace metals Unknown 10/11/2023   • Weed pollen Itching 2016   • Methocarbamol Rash and Unknown 10/11/2023   • Nickel Rash 10/11/2023   • Sulfa (sulfonamide antibiotics) Nausea/vomiting and GI Upset 2011         Current Medications has a current medication list which includes the following prescription(s): acetaminophen, aspirin, atorvastatin, bupropion sr, cyclobenzaprine, dapagliflozin propanediol, docusate sodium, furosemide, gabapentin, golimumab, hydromorphone, hydromorphone, hydromorphone, leflunomide, levothyroxine, lisinopril, metformin, metformin, multivit-minerals/folic acid, naloxone, onetouch ultra test, potassium gluconate, prednisone, and tresiba flextouch u-100.       Vitals:   There were no vitals filed for this visit.         "  Infusion Pre-procedure Checklist:   - Allergies reviewed: yes   - Medications reviewed: yes       - Previous reaction to current treatment: no      Assess patient for the concerns below. Document provider notification as appropriate.  - Active or recent infection with/without current antibiotic use: no  - Recent or planned invasive dental work: no  - Recent or planned surgeries: no  - Recently received or plans to receive vaccinations: no  - Has treatment related toxicities: no  - Is pregnant:  no      Pain: 3   - Is the pain different from normal: no   - Is the pain tolerable: yes   - Is your Doctor aware:  yes      Labs: Labs drawn and sent per order         Fall Risk Screening:         Review Of Systems:  Review of Systems   All other systems reviewed and are negative.        Infusion Readiness:   - Assessment Concerns Related to Infusion: No  - Provider notified: no      Document Below Only If Indicated:   New Patient Education:    N/A (returning patient for continuation of therapy. Ongoing education provided as needed.)        Treatment Conditions & Drug Specific Questions:    Golimumab  (SIMPONI)    (Unless otherwise specified on patient specific therapy plan):     TREATMENT CONDITIONS:  Unless otherwise specified on patient specific thearpy plan HOLD and notify provider prior to proceeding with today's infusion if patient with:  o Positive T-Spot  o Positive Hepatitis B Surface Ag    Lab Results   Component Value Date    TBSIN Negative 07/20/2022    QFG NEGATIVE 07/30/2018      No results found for: \"HAGCN\", \"HEPBSURABI\", \"HBSAG\", \"XHAGF\", \"HEPBSAG\", \"EXTHEPBSAG\", \"NONUHSWGH\"   No results found for: \"NONUHFIRE\", \"NONUHSWGH\", \"NONUHFISH\", \"EXTHEPBSAG\"  No results found for: \"HEPATOT\", \"HEPAIGM\", \"HEPBCIGM\", \"HEPBCAB\", \"HBEAG\", \"HEPCAB\"   No results found for: \"HEPCAB\"  No results found for: \"HBCTI\", \"HEPBCAB\"    Lab Results   Component Value Date    WBC 5.1 03/08/2024    HGB 12.3 03/08/2024    HCT 38.3 " 03/08/2024    MCV 95 03/08/2024     03/08/2024        Labs reviewed and patient meets treatment conditions? Yes    DRUG SPECIFIC QUESTIONS:   - Up to date on all immunizations? Yes    Immunization History   Administered Date(s) Administered   • Flu vaccine (IIV4), preservative free *Check age/dose* 09/28/2013, 09/14/2016, 10/06/2020   • Influenza, High Dose Seasonal, Preservative Free 09/04/2019, 09/09/2023   • Influenza, seasonal, injectable 10/09/2014, 10/26/2015   • Novel influenza-H1N1-09, preservative-free 12/23/2009   • Pfizer Gray Cap SARS-CoV-2 04/25/2022   • Pfizer Purple Cap SARS-CoV-2 01/17/2021, 03/27/2021, 08/20/2021   • Pneumococcal conjugate vaccine, 20-valent (PREVNAR 20) 05/27/2022   • Pneumococcal polysaccharide vaccine, 23-valent, age 2 years and older (PNEUMOVAX 23) 10/13/2014, 01/13/2017   • Tdap vaccine, age 7 year and older (BOOSTRIX, ADACEL) 05/27/2022   • Zoster, live 11/03/2012         - Any history of or new or worsening s/s of heart failure? No  (If worsening s/s notify provider prior to proceeding. Simponi may cause   exacerbation of heart failure)     - Any history of cancer, especially lymphomas? No    (Box Warning: Malignancy)      REMINDER:  WEIGHT BASED DRUG    Recommended Vitals/Observation:  Vitals: Take vital signs prior to starting infusion, at infusion conclusion and as needed.   Observation: No observation.        Weight Based Drug Calculations:    WEIGHT BASED DRUGS: NOT APPLICABLE / FLAT DOSE          Initiated By: Gunjan Arias RN   Time: 8:51 AM     We administered golimumab (Simponi Aria) 137.5 mg in sodium chloride 0.9% 100 mL IV.

## 2024-05-01 ENCOUNTER — TELEPHONE (OUTPATIENT)
Dept: PRIMARY CARE | Facility: CLINIC | Age: 69
End: 2024-05-01

## 2024-05-01 DIAGNOSIS — M25.522 LEFT ELBOW PAIN: Primary | ICD-10-CM

## 2024-05-01 DIAGNOSIS — M25.512 ACUTE PAIN OF LEFT SHOULDER: ICD-10-CM

## 2024-05-01 RX ORDER — METHYLPREDNISOLONE 4 MG/1
TABLET ORAL
Qty: 21 TABLET | Refills: 0 | Status: SHIPPED | OUTPATIENT
Start: 2024-05-01 | End: 2024-05-10 | Stop reason: ALTCHOICE

## 2024-05-01 NOTE — TELEPHONE ENCOUNTER
Pt c/o left shoulder/upper arm pain and swelling. Pt denies trauma but states she fell at the end of last year. Pt states pain and swelling is from left elbow to left shoulder.

## 2024-05-06 ENCOUNTER — HOSPITAL ENCOUNTER (OUTPATIENT)
Dept: RADIOLOGY | Facility: CLINIC | Age: 69
Discharge: HOME | End: 2024-05-06
Payer: MEDICARE

## 2024-05-06 DIAGNOSIS — M25.512 ACUTE PAIN OF LEFT SHOULDER: ICD-10-CM

## 2024-05-06 DIAGNOSIS — M25.522 LEFT ELBOW PAIN: ICD-10-CM

## 2024-05-06 PROCEDURE — 73080 X-RAY EXAM OF ELBOW: CPT | Mod: LT

## 2024-05-06 PROCEDURE — 73030 X-RAY EXAM OF SHOULDER: CPT | Mod: LT

## 2024-05-06 PROCEDURE — 73030 X-RAY EXAM OF SHOULDER: CPT | Mod: LEFT SIDE | Performed by: RADIOLOGY

## 2024-05-06 PROCEDURE — 73080 X-RAY EXAM OF ELBOW: CPT | Mod: LEFT SIDE | Performed by: RADIOLOGY

## 2024-05-07 ENCOUNTER — TELEPHONE (OUTPATIENT)
Dept: PRIMARY CARE | Facility: CLINIC | Age: 69
End: 2024-05-07
Payer: MEDICARE

## 2024-05-07 NOTE — TELEPHONE ENCOUNTER
Pt states she got bite by a spider last night. She states it itches really bad and she was scratching last night so she has red marks on her left upper arm.

## 2024-05-09 ENCOUNTER — TELEPHONE (OUTPATIENT)
Dept: PRIMARY CARE | Facility: CLINIC | Age: 69
End: 2024-05-09
Payer: MEDICARE

## 2024-05-09 PROBLEM — S09.90XA INJURY OF HEAD: Status: RESOLVED | Noted: 2024-05-09 | Resolved: 2024-05-09

## 2024-05-09 RX ORDER — MELATONIN 2.5MG/10ML
LIQUID (ML) ORAL
COMMUNITY

## 2024-05-09 RX ORDER — TALC
250 POWDER (GRAM) TOPICAL DAILY
COMMUNITY

## 2024-05-10 ENCOUNTER — OFFICE VISIT (OUTPATIENT)
Dept: PRIMARY CARE | Facility: CLINIC | Age: 69
End: 2024-05-10
Payer: MEDICARE

## 2024-05-10 VITALS
DIASTOLIC BLOOD PRESSURE: 54 MMHG | HEART RATE: 101 BPM | TEMPERATURE: 99.8 F | OXYGEN SATURATION: 95 % | WEIGHT: 160 LBS | BODY MASS INDEX: 33.44 KG/M2 | SYSTOLIC BLOOD PRESSURE: 96 MMHG | RESPIRATION RATE: 16 BRPM

## 2024-05-10 DIAGNOSIS — E11.9 TYPE 2 DIABETES MELLITUS WITHOUT COMPLICATION, WITH LONG-TERM CURRENT USE OF INSULIN (MULTI): Primary | ICD-10-CM

## 2024-05-10 DIAGNOSIS — E78.2 MIXED HYPERLIPIDEMIA: ICD-10-CM

## 2024-05-10 DIAGNOSIS — E06.3 AUTOIMMUNE THYROIDITIS: ICD-10-CM

## 2024-05-10 DIAGNOSIS — I10 ESSENTIAL (PRIMARY) HYPERTENSION: ICD-10-CM

## 2024-05-10 DIAGNOSIS — Z79.4 TYPE 2 DIABETES MELLITUS WITHOUT COMPLICATION, WITH LONG-TERM CURRENT USE OF INSULIN (MULTI): Primary | ICD-10-CM

## 2024-05-10 DIAGNOSIS — L40.50 PSORIATIC ARTHRITIS (MULTI): ICD-10-CM

## 2024-05-10 PROCEDURE — 1125F AMNT PAIN NOTED PAIN PRSNT: CPT | Performed by: NURSE PRACTITIONER

## 2024-05-10 PROCEDURE — 3044F HG A1C LEVEL LT 7.0%: CPT | Performed by: NURSE PRACTITIONER

## 2024-05-10 PROCEDURE — 4010F ACE/ARB THERAPY RXD/TAKEN: CPT | Performed by: NURSE PRACTITIONER

## 2024-05-10 PROCEDURE — 1160F RVW MEDS BY RX/DR IN RCRD: CPT | Performed by: NURSE PRACTITIONER

## 2024-05-10 PROCEDURE — 3074F SYST BP LT 130 MM HG: CPT | Performed by: NURSE PRACTITIONER

## 2024-05-10 PROCEDURE — 1159F MED LIST DOCD IN RCRD: CPT | Performed by: NURSE PRACTITIONER

## 2024-05-10 PROCEDURE — 99349 HOME/RES VST EST MOD MDM 40: CPT | Performed by: NURSE PRACTITIONER

## 2024-05-10 PROCEDURE — 3048F LDL-C <100 MG/DL: CPT | Performed by: NURSE PRACTITIONER

## 2024-05-10 PROCEDURE — 3078F DIAST BP <80 MM HG: CPT | Performed by: NURSE PRACTITIONER

## 2024-05-10 RX ORDER — BLOOD SUGAR DIAGNOSTIC
1 STRIP MISCELLANEOUS DAILY
Qty: 100 STRIP | Refills: 3 | Status: SHIPPED | OUTPATIENT
Start: 2024-05-10 | End: 2025-05-10

## 2024-05-10 ASSESSMENT — ENCOUNTER SYMPTOMS
WEAKNESS: 1
EYES NEGATIVE: 1
ARTHRALGIAS: 1
FATIGUE: 1
FREQUENCY: 1
BRUISES/BLEEDS EASILY: 1
ALLERGIC/IMMUNOLOGIC NEGATIVE: 1
PSYCHIATRIC NEGATIVE: 1
BACK PAIN: 1
CONSTIPATION: 1
ACTIVITY CHANGE: 1
APPETITE CHANGE: 1
JOINT SWELLING: 1
CARDIOVASCULAR NEGATIVE: 1
RESPIRATORY NEGATIVE: 1

## 2024-05-10 ASSESSMENT — PAIN SCALES - GENERAL: PAINLEVEL: 10-WORST PAIN EVER

## 2024-05-10 NOTE — PROGRESS NOTES
"Subjective Nesha Mcleod is a 68 y/o female being seen for a rountine 2 month house calls follow up.    HPI Pt seen in in private single family apartment unaccompanied. PMHx: Chronic pain, DDD, HTN, HLD, DM, Hypothyroid, Psoriatic arthritis, GERD. Pt seen sitting in chair with legs dependent. Pt alert and oriented and cooperative with examination. Pt with reports of pain 10/10 in shoulder, hips, knees, and elbow. Pt sees pain management on 5/15/24. Pt had cortisone injection in to right hip three weeks ago without relief. Reviewed Xray results pt had or shoulder and elbow this week. Pt reports blood sugars have not been the best running 150-185. Admits to not eating the greatest, states she is is eating a lot of frozen meals because she can no longer stand long enough to cook. Pt reports an 8 pound weight gain over winter that she hasn't been able to lose. States appetite is \"super good\". Denies fever, chills, night sweats, headaches, or dizziness. Ambulates with rolator and denies recent falls. Denies SOB, cough CP or palpitations. Denies N/V/D admits to constipations with a bowel movement every couple days. Tried metamucil with fiber and it caused intense diarrhea so she will not take it again. Pt received Simponi infusion last week where she states she had a very low blood pressure because she hadn't eaten, she said once she ate the numbers came up. Pt with blood pressure cuff she is looking for to keep a log. Pt reports sleep is fair and energy okay. Pt with no other complaints or concerns.   Home Visit medically necessary due to: pt has chronic condition that makes access to a traditional office visit very difficult, illness or condition that results in activity limitation or restriction that impacts the ability to leave home such as; unsteady gait/ poor condition.    Review of Systems   Constitutional:  Positive for activity change, appetite change and fatigue.   HENT: Negative.     Eyes: Negative.  "   Respiratory: Negative.     Cardiovascular: Negative.    Gastrointestinal:  Positive for constipation.   Endocrine: Positive for cold intolerance.   Genitourinary:  Positive for frequency.   Musculoskeletal:  Positive for arthralgias, back pain, gait problem and joint swelling.   Skin: Negative.    Allergic/Immunologic: Negative.    Neurological:  Positive for weakness.   Hematological:  Bruises/bleeds easily.   Psychiatric/Behavioral: Negative.         Objective   BP 96/54 (BP Location: Right arm, Patient Position: Sitting)   Pulse 101   Temp 37.7 °C (99.8 °F) (Temporal)   Resp 16   Wt 72.6 kg (160 lb)   SpO2 95%   BMI 33.44 kg/m²     Physical Exam  Constitutional:       Appearance: She is normal weight.   HENT:      Head: Normocephalic and atraumatic.      Nose: Nose normal.      Mouth/Throat:      Mouth: Mucous membranes are moist.      Pharynx: Oropharynx is clear.   Eyes:      Extraocular Movements: Extraocular movements intact.      Conjunctiva/sclera: Conjunctivae normal.      Pupils: Pupils are equal, round, and reactive to light.   Cardiovascular:      Rate and Rhythm: Normal rate and regular rhythm.      Pulses: Normal pulses.      Heart sounds: Normal heart sounds.   Pulmonary:      Effort: Pulmonary effort is normal.      Breath sounds: Examination of the right-upper field reveals decreased breath sounds. Examination of the left-upper field reveals decreased breath sounds. Examination of the right-middle field reveals decreased breath sounds. Examination of the left-middle field reveals decreased breath sounds. Examination of the right-lower field reveals decreased breath sounds. Examination of the left-lower field reveals decreased breath sounds. Decreased breath sounds present.   Abdominal:      General: Bowel sounds are normal.      Palpations: Abdomen is soft.   Musculoskeletal:         General: Tenderness present.      Cervical back: Normal range of motion.   Skin:     General: Skin is warm  and dry.      Capillary Refill: Capillary refill takes less than 2 seconds.   Neurological:      Mental Status: She is alert and oriented to person, place, and time.      Motor: Weakness present.      Gait: Gait abnormal.   Psychiatric:         Mood and Affect: Mood normal.         Behavior: Behavior normal.         Thought Content: Thought content normal.         Judgment: Judgment normal.       Patient Active Problem List   Diagnosis    Psoriatic arthritis (Multi)    Overactive bladder    Myalgia    Soft tissue mass    Neurogenic claudication due to lumbar spinal stenosis    Acquired partial lipodystrophy    Acute cystitis without hematuria    Acute renal failure syndrome (CMS-HCC)    ALT (SGPT) level raised    Altered mental status    Anal sphincter incontinence    Anxiety    Arthritis    Arthritis of neck    Autoimmune thyroiditis    Blepharitis    Blurred vision, bilateral    Cataract, nuclear sclerotic, both eyes    Cellulitis    Cervical radicular pain    Pain, radicular, lumbar    Choroidal nevus, left eye    Chronic intractable pain    Chronic low back pain    Clouded consciousness    Colovaginal fistula    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    Consecutive exotropia    Change in bowel habits    Constipation    Contact dermatitis    Controlled insulin dependent type 1 diabetes mellitus (Multi)    Type 2 diabetes mellitus (Multi)    DDD (degenerative disc disease), thoracic    Dermatochalasis    Difficulty walking    Disorder of refraction    Disturbance, visual, subjective    Diverticulosis of colon    Dry eyes    Early cataract    Elevated creatine kinase    Essential (primary) hypertension    Fecal soiling due to fecal incontinence    Fistula involving female genital tract    Fracture of cervical vertebra (Multi)    Gastroesophageal reflux disease    Generalized osteoarthritis of multiple sites    Hemorrhoids without complication    Iron deficiency anemia    Leukopenia    Lumbar radiculitis    Mixed  hyperlipidemia    Myofascial pain    Numbness    Obesity with body mass index 30 or greater    Palsy of conjugate gaze    Poor venous access    Incisional hernia    Postlaminectomy syndrome, lumbar region    Pseudophakia of both eyes    Psoriasis    Recurrent urinary tract infection    Retinopathy, background, nonproliferative, mild    Skin excoriation    Swelling of upper arm    Vaginal disorder    Weakness generalized         Assessment/Plan   Diagnoses and all orders for this visit:  Type 2 diabetes mellitus without complication, with long-term current use of insulin (Multi)  Comments:  Continue Blood sugar readings  Continue Tresiba, Farxiga, and Metformin  Orders:  -     OneTouch Ultra Test strip; Apply 1 strip topically once daily.  Mixed hyperlipidemia  Comments:  Continue Lipitor  Essential (primary) hypertension  Comments:  Continue Lisinopril  Autoimmune thyroiditis  Comments:  Continue Levothyroxine  Psoriatic arthritis (Multi)  Comments:  Continue Simponi infusions  Follow up in 2 months or prn  Angela Dyer, APRN-CNP

## 2024-05-15 ENCOUNTER — OFFICE VISIT (OUTPATIENT)
Dept: PAIN MEDICINE | Facility: CLINIC | Age: 69
End: 2024-05-15
Payer: MEDICARE

## 2024-05-15 ENCOUNTER — OFFICE VISIT (OUTPATIENT)
Dept: RHEUMATOLOGY | Facility: CLINIC | Age: 69
End: 2024-05-15
Payer: MEDICARE

## 2024-05-15 VITALS
DIASTOLIC BLOOD PRESSURE: 69 MMHG | HEART RATE: 99 BPM | SYSTOLIC BLOOD PRESSURE: 100 MMHG | RESPIRATION RATE: 16 BRPM | OXYGEN SATURATION: 98 %

## 2024-05-15 VITALS
DIASTOLIC BLOOD PRESSURE: 76 MMHG | SYSTOLIC BLOOD PRESSURE: 111 MMHG | WEIGHT: 162 LBS | HEART RATE: 102 BPM | HEIGHT: 58 IN | BODY MASS INDEX: 34 KG/M2

## 2024-05-15 DIAGNOSIS — Z79.899 ENCOUNTER FOR LONG-TERM (CURRENT) USE OF MEDICATIONS: ICD-10-CM

## 2024-05-15 DIAGNOSIS — M48.061 SPINAL STENOSIS, LUMBAR REGION, WITHOUT NEUROGENIC CLAUDICATION: ICD-10-CM

## 2024-05-15 DIAGNOSIS — L40.50 PSORIATIC ARTHRITIS (MULTI): Primary | ICD-10-CM

## 2024-05-15 DIAGNOSIS — M96.1 POSTLAMINECTOMY SYNDROME, LUMBAR REGION: Primary | ICD-10-CM

## 2024-05-15 DIAGNOSIS — M16.11 PRIMARY OSTEOARTHRITIS OF RIGHT HIP: ICD-10-CM

## 2024-05-15 DIAGNOSIS — M79.18 CERVICAL MYOFASCIAL PAIN SYNDROME: ICD-10-CM

## 2024-05-15 PROCEDURE — 3048F LDL-C <100 MG/DL: CPT | Performed by: NURSE PRACTITIONER

## 2024-05-15 PROCEDURE — 3044F HG A1C LEVEL LT 7.0%: CPT | Performed by: INTERNAL MEDICINE

## 2024-05-15 PROCEDURE — 1159F MED LIST DOCD IN RCRD: CPT | Performed by: INTERNAL MEDICINE

## 2024-05-15 PROCEDURE — 3078F DIAST BP <80 MM HG: CPT | Performed by: INTERNAL MEDICINE

## 2024-05-15 PROCEDURE — 1160F RVW MEDS BY RX/DR IN RCRD: CPT | Performed by: INTERNAL MEDICINE

## 2024-05-15 PROCEDURE — 99213 OFFICE O/P EST LOW 20 MIN: CPT | Performed by: NURSE PRACTITIONER

## 2024-05-15 PROCEDURE — 3078F DIAST BP <80 MM HG: CPT | Performed by: NURSE PRACTITIONER

## 2024-05-15 PROCEDURE — 1160F RVW MEDS BY RX/DR IN RCRD: CPT | Performed by: NURSE PRACTITIONER

## 2024-05-15 PROCEDURE — 1036F TOBACCO NON-USER: CPT | Performed by: INTERNAL MEDICINE

## 2024-05-15 PROCEDURE — 3074F SYST BP LT 130 MM HG: CPT | Performed by: NURSE PRACTITIONER

## 2024-05-15 PROCEDURE — 4010F ACE/ARB THERAPY RXD/TAKEN: CPT | Performed by: NURSE PRACTITIONER

## 2024-05-15 PROCEDURE — 1125F AMNT PAIN NOTED PAIN PRSNT: CPT | Performed by: NURSE PRACTITIONER

## 2024-05-15 PROCEDURE — 99213 OFFICE O/P EST LOW 20 MIN: CPT | Performed by: INTERNAL MEDICINE

## 2024-05-15 PROCEDURE — 3074F SYST BP LT 130 MM HG: CPT | Performed by: INTERNAL MEDICINE

## 2024-05-15 PROCEDURE — 3044F HG A1C LEVEL LT 7.0%: CPT | Performed by: NURSE PRACTITIONER

## 2024-05-15 PROCEDURE — 4010F ACE/ARB THERAPY RXD/TAKEN: CPT | Performed by: INTERNAL MEDICINE

## 2024-05-15 PROCEDURE — 1036F TOBACCO NON-USER: CPT | Performed by: NURSE PRACTITIONER

## 2024-05-15 PROCEDURE — 1159F MED LIST DOCD IN RCRD: CPT | Performed by: NURSE PRACTITIONER

## 2024-05-15 PROCEDURE — 3048F LDL-C <100 MG/DL: CPT | Performed by: INTERNAL MEDICINE

## 2024-05-15 RX ORDER — GABAPENTIN 300 MG/1
300 CAPSULE ORAL 2 TIMES DAILY
Qty: 60 CAPSULE | Refills: 5 | Status: SHIPPED | OUTPATIENT
Start: 2024-05-15

## 2024-05-15 RX ORDER — HYDROMORPHONE HYDROCHLORIDE 2 MG/1
2 TABLET ORAL 3 TIMES DAILY PRN
Qty: 84 TABLET | Refills: 0 | Status: SHIPPED | OUTPATIENT
Start: 2024-05-22 | End: 2024-06-19

## 2024-05-15 RX ORDER — HYDROMORPHONE HYDROCHLORIDE 2 MG/1
2 TABLET ORAL 3 TIMES DAILY PRN
Qty: 84 TABLET | Refills: 0 | Status: SHIPPED | OUTPATIENT
Start: 2024-06-19 | End: 2024-07-17

## 2024-05-15 RX ORDER — CYCLOBENZAPRINE HCL 10 MG
10 TABLET ORAL 2 TIMES DAILY PRN
Qty: 60 TABLET | Refills: 5 | Status: SHIPPED | OUTPATIENT
Start: 2024-05-15

## 2024-05-15 RX ORDER — HYDROMORPHONE HYDROCHLORIDE 2 MG/1
2 TABLET ORAL 3 TIMES DAILY PRN
Qty: 84 TABLET | Refills: 0 | Status: SHIPPED | OUTPATIENT
Start: 2024-07-17 | End: 2024-08-14

## 2024-05-15 ASSESSMENT — ENCOUNTER SYMPTOMS
ENDOCRINE NEGATIVE: 1
LOSS OF SENSATION IN FEET: 0
HEADACHES: 0
MYALGIAS: 1
FATIGUE: 1
CARDIOVASCULAR NEGATIVE: 1
SEIZURES: 0
OCCASIONAL FEELINGS OF UNSTEADINESS: 1
DIZZINESS: 0
JOINT SWELLING: 1
DEPRESSION: 1
CONSTITUTIONAL NEGATIVE: 1
NECK PAIN: 1
BACK PAIN: 1
LIGHT-HEADEDNESS: 0
EYES NEGATIVE: 1
ARTHRALGIAS: 1
NUMBNESS: 1
WEAKNESS: 1
SPEECH DIFFICULTY: 0
PSYCHIATRIC NEGATIVE: 1
TREMORS: 0
DIARRHEA: 1
RESPIRATORY NEGATIVE: 1
ALLERGIC/IMMUNOLOGIC NEGATIVE: 1
FACIAL ASYMMETRY: 0
ROS SKIN COMMENTS: PSORIASIS
ARTHRALGIAS: 1
NECK STIFFNESS: 0
CONSTIPATION: 1
BACK PAIN: 1
HEMATOLOGIC/LYMPHATIC NEGATIVE: 1
GASTROINTESTINAL NEGATIVE: 1

## 2024-05-15 ASSESSMENT — PAIN SCALES - GENERAL: PAINLEVEL: 8

## 2024-05-15 NOTE — PROGRESS NOTES
Subjective   Patient ID: Nesha Mcleod is a 69 y.o. female who presents for Follow-up (Right hip pain~ Patient did have ortho inject cortisone about a month ago ).  HPI  Patient with history of psoriatic arthritis.  Previously had been on methotrexate, Remicade, Cimzia.    When I last saw her in July we she had been off of Simponi Aria since August 2022 due to issues with her left elbow.  She continued on leflunomide.    Patient finally was able to return to Simponi Aria in the fall 2023.  She was having a lot of pain and could barely move.  She does notice some decrease in efficacy the week before her infusion.  She does get loose stool at thinks it is from her chronic narcotics.  She alternates between constipation and diarrhea.  A few months ago she had slid off the toilet and hit her left elbow and has been hurting.  She had her PCP get an x-ray and it did show arthritis as well as some fluid.  She will be seeing her orthopedic  She only has a few dry spots on her skin.    Overall she feels that her psoriatic arthritis is doing better now that she is back on Simponi Aria.  Review of Systems   Constitutional:  Positive for fatigue.   Gastrointestinal:  Positive for constipation and diarrhea.   Musculoskeletal:  Positive for arthralgias, back pain and gait problem.   Skin:         Psoriasis       Objective   Physical Exam  GEN: NAD A&O x3 appropriate affect Rollator  HENTno enlarged glands or thyroid  EYES: no conjunctival redness, eyelids normal  SKIN: no rashes seen on exposed skin  MSK:  Hypertrophic changes seen in the DIP and PIP but no tenderness squaring the CMC's no warmth in the left elbow  Bilateral knee replacements  Kyphosis scoliosis of the thoracic lumbar spine    Assessment/Plan     Psoriatic arthritis previous medications have included methotrexate, Remicade, Cimzia  ` -Will have her continue with Simponi Aria infusions.  Her last infusion was April 30.  I feel is appropriate.  She is much more  mobile and had been off a year and could barely move.  No serious infections.  She did have a fall off the toilet and will be evaluated on her left elbow by orthopedics.  No serious infections.  She did have a fall off the toilet and will be evaluated on her left elbow by orthopedics.  Reviewed her recent blood work    Will have her come back again in 6 months or as needed  Sheila Valdes MD 05/15/24 11:48 AM

## 2024-05-15 NOTE — PROGRESS NOTES
Subjective   Patient ID: Nesha Mcleod is a 69 y.o. female presents for chronic pain management.       Nesha follows up for interval reevaluation for chronic low back pain from lumbar post laminectomy syndrome and mid back pain from thoracic disc herniations.  Right hip pain from arthritis.  Does follow rheumatology and receives DMARD infusions.    Hydromorphone 2 MG up to 3 times daily as needed, gabapentin 300 MG to a total of twice daily due to renal impairment, once a day and Flexeril 10 MG twice a day as she needs reduces pain and improves function up to 75%. Average pain score is 5 out of 10 with medication.  Today it is a 9 out of 10 due to right hip pain.  Better able to manage ADLs with improved function since injection therapy in combination with medicinal therapy. Has an average quality family and social life with current condition and treatment.      Toxicology consistent May 17, 2023.  Annual controlled substance agreement and opioid risk tool are completed and scanned into the chart May 17, 2023.     Serena is with laminectomy changes at T11, L2, L3 and L5 levels is with a grade 1 anterolisthesis of L5 on S1. At the L2-L3 level laminectomy changes are also noted with scoliosis and disc degeneration. Is also with moderate facet joint arthropathy. The combination of these changes causes moderate to severe central canal stenosis, severe left lateral recess and moderate right lateral recess stenosis with severe left neural foramina and moderate to severe right neuroforaminal narrowing. MRI was from April 20, 2021 ordered by primary care doctor from [facility].    Did fall last office visit.  This was March 11, 2024.  Primary care ordered imaging of shoulder and hip with pelvis x-rays that showed mild arthritis.  Went to [facility] and Dr. Sosa injected steroid into right greater trochanteric bursa for hip relief from the fall.  Denies any improvement of right lateral hip pain from injection  therapy.  Offered physical therapy for her right hip pain from arthritis in the form of aqua therapy or land therapy.  She declines due to transportation and cost.  Continues to utilize rollator walker for ambulation.    For continuity:   Given the patient's report of reduced pain and improved functional ability without adverse effects, it is reasonable to treat with narcotic medications. The terms of the opioid agreement as well as the potential risks and adverse effects of the patient's medication regimen were discussed in detail. This includes if applicable due to dosage of medication permission to discuss and coordinate care with other treatment providers relevant to the patients condition. The patient verbalized understanding.     Risks and side effects of chronic opioid therapy including but not limited to tolerance, dependence, constipation, hyperalgesia, cognitive side effects, addiction and possible death due to overuse and or misuse were discussed. I also discussed that such medications when co-administered with other sedative agents including but not limited to alcohol, benzodiazepines, sedative hypnotics and illegal drugs could pose life threatening consequences including death. I also explained the impact that the administration of such medication has on a patient with obstructive sleep apnea and continued recommendations for use of apnea devices if ordered are prescribed by other physicians. In order to effectively and safely treat the pain, I also emphasized the importance of compliance with the treatment plan, as well as compliance with the terms of the opioid agreement, which was reviewed in detail. I explained the importance of being responsible with the medications and to take these only as prescribed, never in excess and never for reasons other than pain reduction. The patient was counseled on keeping the medications safe and locked away from children and other adults as well as disposal methods  and options. The patient understood the risks and instructions.     I also discussed with the patient in detail that based on the clinical response to the opioid medications and improvements of activities of daily living, sleep, and work performance in light of compliance with the treatment plan we can continue this form of therapy for the above chronic pain. The goal and rationale used for current treatment with chronic opioid medication is to control the pain and alleviate disability induced by the chronic pain condition noted above after failures of other non-opioid and nonpharmacological modalities to treat the chronic pain and the symptoms associated with have failed. The patient understood the goals in terms of the above treatment plan and had no further questions prior to leaving the office today.     Of note, the above-mentioned diagnoses/conditions and expected fluctuating nature of pain, and pain characteristic changes may lead to prolonged functional impairment requiring frequent and multiple reassessments with continued high level medical decision making. As noted, medication and medication management may require opiate therapy in excess of a routine less than 30 day medication requirement. The patient may require daily opiate therapy necessitating month-long prescription medication as noted above in order to perform activities of daily living and achieve acceptable quality of life with respect to their chronic pain condition for the foreseeable future. We monitor our patient's carefully through drug monitoring, medication counts, urine drug testing specific to their medication as well as a myriad of other substances and with frequent follow-ups with interval reassement of the chronic pain condition, its pathophysiology and prognosis.     The level of clinical decision making at this office visit is high due to high risks and complications including mortality and morbidity related to acute and chronic  pain with respects to life, bodily function, and treatment. Risks and clinical decisions with respect to under treatment, failure to maintain adequate treatment, and/or overtreatment complications and outcomes were discussed with the patient with respect to their chronic pain conditions, interventional therapies, as well as the use of various medications including possible controlled/dangerous medications. The amount and complexity of reviewed data at this in subsequent office visits is high given patient's fluctuating clinical presentation, laboratory and radiographic reports, prescription monitoring program data, and medication history as well as other relevant data as noted above. Pertinent negatives and positives data was used in consideration for the above-mentioned high complexity.      Given the patient's total MED, general use of daily opiates, or other coadministered medications in various classes the patient was offered a prescription for Narcan. I instructed the patient that it is important that patient fill this medication in order to demonstrate understanding of the gravity of possible side effects including respiratory depression and risk of overdose of this opiate load or medication combination. As such patient will be required to bring Narcan prescription to follow-up appointments as part of compliance with continued opiate care.     With respect to opiate induced constipation I discussed multiple ways to combat this problem including staying hydrated and taking over-the-counter medications such as Dulcolax, Miralax and Senna. If these treatments are not effective we could consider such medications as Amitiza, Linzess and Movantik.     Disclaimer: This note was transcribed using an audio transcription device. As such, minor errors may be present with regard to spelling, punctuation, and inadvertent word insertion. Please disregard such errors.    Narrative & Impression   Interpreted By:   Schoenberger, Joseph,   STUDY:  XR HIP RIGHT WITH PELVIS WHEN PERFORMED 2 OR 3 VIEWS; ;  3/12/2024  8:47 am      INDICATION:  Signs/Symptoms:pain.      COMPARISON:  None.      ACCESSION NUMBER(S):  [id number]      ORDERING CLINICIAN:  DORIS SANTOS      FINDINGS:  There are calcifications of the superolateral acetabular labrum. Mild  degenerative changes in the hip with some narrowing and mild  spurring. No fracture or dislocation.      IMPRESSION:  No definite acute findings. See discussion above.          MACRO:  None      Signed by: Joseph Schoenberger 3/13/2024 9:09 AM  Dictation workstation:   [id number]     Narrative & Impression   Interpreted By:  Kathie Montaño,   STUDY:  XR SHOULDER LEFT 2+ VIEWS;  5/6/2024 8:50 am      INDICATION:  Signs/Symptoms:pain and swelling.      COMPARISON:  07/14/2021      ACCESSION NUMBER(S):  [id number]      ORDERING CLINICIAN:  DORIS SANTOS      FINDINGS:  Three views of the left shoulder obtained.      Artifact from overlying clothing noted. Reverse shoulder arthroplasty  in stable alignment. Likely unchanged mild separation between the  articular surface of the proximal humeral component and proximal  humerus and mild cortical irregularity adjacent to the stem of the  humeral prosthesis allowing for differences in projection. Smooth  irregular ununited fracture of the scapula and smooth  calcifications/ossifications adjacent to the acromion similar to the  prior exam.      IMPRESSION:  Status post reverse shoulder arthroplasty with intact hardware and  mild productive changes similar to 07/14/2021. Remote ununited  scapular fracture again noted.      MACRO:  None.      Signed by: Kathie Montaño 5/9/2024 8:33 AM  Dictation workstation:   [id number]   Narrative & Impression  Interpreted By:  Kathie Montaño,   STUDY:  XR ELBOW LEFT 3+ VIEWS;  5/6/2024 8:50 am      INDICATION:  Signs/Symptoms:pain and swelling.      COMPARISON:  06/28/2008      ACCESSION  NUMBER(S):  [id number]      ORDERING CLINICIAN:  DORIS SANTOS      FINDINGS:  Five views of the left elbow obtained.      No acute fracture or osseous displacement. Radial humeral joint space  narrowing. Radial humeral and radioulnar joint spurring. Faint  periarticular calcifications. Mild spurring/productive change along  the lateral distal humeral epicondyle. Small joint effusion. No  erosive changes.      IMPRESSION:  Moderate productive/osteoarthritic changes of the left elbow with  small joint effusion.      MACRO:  None.      Signed by: Kathie Montaño 5/9/2024 8:29 AM  Dictation workstation:   [id number]    Signed Time Phone Pager   Love Matos MD 9/05/2023 11:03 [phone]          Narrative & Impression   PROCEDURE:         HIP RT 2 VIEW W OR WO AP PELVIS - IXR  0057  REASON FOR EXAM: hip pain     RESULT: MRN: [id number]  Patient Name: LIZZIE PHILLIPS     STUDY:  HIP RT 2 VIEW W OR WO AP PELVIS 9/5/2023 10:55 am     INDICATION:  Right hip pain with history of fall 1 week ago     COMPARISON:  None available.     ACCESSION NUMBER(S):  [id number]     ORDERING CLINICIAN:  KATHY LOBO     TECHNIQUE:  AP view of the pelvis with AP and lateral views of the right hip.     FINDINGS:  No fracture or dislocation of the right hip is demonstrated. There is no  acute bony abnormality of the pelvis. There is chondrocalcinosis of the hips  bilaterally. Arterial calcification is also present. There are pelvic  phleboliths identified.     IMPRESSION:  No fracture or dislocation of the right hip with no pelvic fracture  identified.     Chondrocalcinosis of the hips bilaterally.  Dictation workstation:   [id number]     Original Interpreting Physician:   LOVE MATOS M.D.  Original Transcribed by/Date: MMODAL Sep  5 2023  9:48A  Original Electronically Signed by/Date: OLVE MATOS M.D. Sep  5 2023 11:03A     Addendum Interpreting Physician:  Addendum Transcribed by/Date: NO ADDENDUM  Addendum Electronically Signed by/Date:      Narrative & Impression   PROCEDURE:         HIP RT WO CONTRAST - ICT  3170  REASON FOR EXAM: Hip trauma, fracture suspected, xray done     RESULT: MRN: [id number]  Patient Name: LIZZIE PHILLIPS     STUDY:  HIP RT WO CONTRAST 9/5/2023 11:50 am     INDICATION:  Pain and fall     COMPARISON:  None available.     ACCESSION NUMBER(S):  [id number]     ORDERING CLINICIAN:  PATTI WASHINGTON     TECHNIQUE:  Unenhanced axial images through the right hip are performed with sagittal  and coronal reformations completed by the technologist at the acquisition  scanner.     FINDINGS:  There is no fracture or dislocation of the right hip. No fracture of the  right hemipelvis is observed. There is chondrocalcinosis of the right hip as  well as osteoarthritis. Small osteophytes project from the right femoral  head.     No intramuscular hematoma is seen.     There is vacuum disc at the L4-5 and L5-S1 levels with a grade 1  spondylolisthesis of L5 on S1 with unilateral spondylolysis on the left at  L5 identified. There is also considerable facet arthrosis bilaterally at  L4-5 and L5-S1 levels.     The urinary bladder is moderately distended.        IMPRESSION:  No fracture or dislocation of the right hip with no fracture of right  hemipelvis identified.     Grade 1 spondylolisthesis of L5 on S1 with pars defect on the left at L5.     Moderate distention of urinary bladder.     Mild osteoarthritis of the right hip with chondrocalcinosis.  Dictation workstation:   [id number]  This exam is available in DICOM format to non-affiliated healthcare  facilities on a secure media free searchable basis with prior patient  authorization.  The patient exposure is reported to a radiation dose index  registry.  All CT examinations are performed with one or more of the  following dose reduction techniques: Automated Exposure Control, Adjustment  of mA and/or KV according to patient size, or use of iterative  reconstruction techniques.     Original  Interpreting Physician:   LATOSHA FRANCOIS M.D.  Original Transcribed by/Date: MMODAL Sep  5 2023 11:13A  Original Electronically Signed by/Date: LATOSHA FRANCOIS M.D. Sep  5 2023 12:04P     Narrative & Impression   PROCEDURE:         SPINE LUMBAR WO CONTRAST - CMR  2010  REASON FOR EXAM: SPINAL STENOSIS OF LUMBAR REGION WITH NEUROGENIC  CLAUDICATION     RESULT: MRN: [id number]  Patient Name: LIZZIE PHILLIPS     STUDY:  SPINE LUMBAR WO CONTRAST; 5/23/2023 1:16 pm     INDICATION:  SPINAL STENOSIS OF LUMBAR REGION WITH NEUROGENIC CLAUDICATION. 68-year-old  woman with chronic lower back pain and bilateral leg pain     COMPARISON:  Lumbar spine MRI 04/20/2021.     ACCESSION NUMBER(S):  [id number]     ORDERING CLINICIAN:  APRIL FLORES     TECHNIQUE:  Multiecho and multiplanar imaging of the lumbar spine was performed without  IV contrast.     FINDINGS:  Redemonstrated levoscoliosis of the lumbar spine. Interbody fusion of the L3  and L4 vertebral bodies again noted with laminectomy changes at L2, L3, and  L5 again seen.     Moderate to severe disc space narrowing is again noted at L1-L2, and L2-L3.        Alignment: There is grade 1 anterolisthesis of L5 on S1. There is grade 1  retrolisthesis of L2 on L3.        Bone marrow signal: Redemonstrated heterogeneous bone marrow signal, which  could be related to marrow reconversion process. Redemonstrated slightly  increased STIR signal within the L2 vertebral body, which may reflect  reactive bone marrow edema in the setting of degenerative change.        Conus: The conus medullaris terminates at the level of L1.        Paraspinal Soft tissues: Postsurgical changes in the posterior lumbar soft  tissues again noted.        Imaged Abdomen: Millimetric T2 hyperintense, T1 hypointense right and left  renal cysts are seen.        T12-L1: Moderate left paracentral disc bulge again noted causing effacement  of the left side of the ventral thecal sac. No significant spinal canal or  neural foraminal  narrowing.        L1-2: Mild disc bulge with facet arthropathy is again noted, resulting in  mild spinal canal narrowing. There is also mild bilateral neural foraminal  narrowing, more pronounced on the right.        L2-3: Laminectomy changes again noted. There is broad-based disc osteophyte  with facet hypertrophy resulting in severe spinal canal stenosis. Severe  bilateral neural foraminal narrowing is identified with likely contact and  impingement upon the exiting bilateral nerve roots.        L3-4: Redemonstrated interbody fusion/ankylosis of the L3-L4 vertebral  bodies with laminectomy changes noted. Mild facet joint arthropathy. No  significant spinal canal narrowing. Mild right neural foraminal narrowing.        L4-5: Spondylolisthesis with broad-based disc protrusion, facet hypertrophy,  and mild ligamentum flavum hypertrophy results in mild spinal canal  narrowing. There is also moderate right and mild-to-moderate left neural  foraminal narrowing.        L5-S1: Laminectomy changes again noted. No sign of spinal canal narrowing.  There is moderate facet joint hypertrophy. There is mild-to-moderate left  and mild right neural foraminal narrowing.     IMPRESSION:  1. Redemonstrated postsurgical and degenerative changes of the lumbar spine,  with multilevel neural foraminal narrowing and spinal canal narrowing.     2. At L2-L3, there is severe bilateral neural foraminal narrowing and severe  spinal canal stenosis.     3. Please see above for full description of findings at each individual disc  level.     4. Redemonstrated heterogeneous bone marrow signal, as can be seen with  marrow reconversion process. Please correlate with appropriate clinical and  laboratory data.        Dictation workstation:   [id number]     Original Interpreting Physician:   SHAQ SOLO MD  Original Transcribed by/Date: MMODAL May 23 2023 12:15P  Original Electronically Signed by/Date: SHAQ SOLO MD May 23 2023  2:52P        Results/Data  Xray Cervical Spine 2 or 3 View 02Aug2022 08:15AM AhsanJamalLeonila      Test Name Result Flag Reference   Xray Cervical Spine 2 or 3 View (Report)       FINAL REPORT  Interpreted by: DICKSON MCNEIL   08/03/22 11:15  MRN: [id number]  Patient Name: LIZZIE PHILLIPS     STUDY:  SPINE, CERVICAL, 2 OR 3 VIEWS; ; 8/2/2022 8:15 am     INDICATION:  CERVICAL PAIN M54.2: Cervical pain.     COMPARISON:  06/27/2022     ACCESSION NUMBER(S):  [id number]     ORDERING CLINICIAN:  LEONILA WHARTON     FINDINGS:  Stable right central venous catheter. Postoperative changes C5-C6  ACDF. Atlantoaxial interval is maintained. Irregularity along the C2  odontoid base compatible better evaluated on the CT from 06/27/2022.  No fracture or traumatic malalignment. Mild multilevel degenerative  changes of the cervical spine. Precervical soft tissue planes are  maintained. Imaged lung fields are clear.     IMPRESSION:  No acute fracture or traumatic malalignment.  Stable CT odontoid irregularity likely secondary to history of  rheumatoid arthritis.  Stable postoperative changes C5-C6 ACDF.  Mild multilevel degenerative changes of the cervical spine.        Electronically signed by: EWA  08/03/22 11:15      Xray Cervical Spine 2 or 3 View 27Jun2022 11:35PM Massimo Skelton      Test Name Result Flag Reference   Xray Cervical Spine 2 or 3 View (Report)       FINAL REPORT  Interpreted by: LOBITO PIKE   06/28/22 00:04  MRN: [id number]  Patient Name: LIZZIE PHILLIPS     STUDY:  Cervical spine x-rays     INDICATION:  fx     COMPARISON:  X-ray cervical spine 08/24/2021  MRI cervical spine 05/28/2020     ACCESSION NUMBER(S):  [id number]     ORDERING CLINICIAN:  MASSIMO SKELTON     TECHNIQUE:  Four views of the cervical spine     FINDINGS:  Limited exam given nonvisualization of the lower cervical spine on  lateral view.     Alignment: Minimal anterior subluxation of C3-4, degenerative.     Bones: Minimal multilevel loss  of vertebral body height. ACDF of  C5-C6.     Disc spaces: Moderate spondylosis.     Soft tissues: Right-sided chest port. Shoulder arthroplasty.     IMPRESSION:  1. Limited exam.  2. No acute finding.     Electronically signed by: SHAHZAD  06/28/22 00:04      Xray Knee 1 or 2 View [id number] 03:30PM Toussaint, Karl      Test Name Result Flag Reference   Xray Knee 1 or 2 View (Report)       FINAL REPORT  Interpreted by: BRITTANY FAYE HANAUER, MD   02/14/22 15:52  MRN: [id number]  Patient Name: LIZZIE PHILLIPS     STUDY:  KNEE; 1 OR 2 VIEWS; 2/14/2022 3:30 pm     INDICATION:  Status post total knee replacement in pacu .     COMPARISON:  None     ACCESSION NUMBER(S):  [id number]     ORDERING CLINICIAN:  KARL TOUSSAINT     TECHNIQUE:  AP and lateral portable postoperative views of the right knee were  obtained.     FINDINGS:  Immediately status post right knee arthroplasty. Femoral and tibial  components appear well seated and in good alignment. Patellar  component is radiolucent. There is postsurgical soft tissue air and  also edema anteriorly in the soft tissues and in the suprapatellar  bursa. No lytic or blastic destructive bone lesion. No acute  fracture or dislocation.     IMPRESSION:  Immediately status post right knee arthroplasty. Postsurgical changes  as described. No acute fracture.     Electronically signed by: BRITTANY FAYE  02/14/22 15:52      MRI L Spine without Contrast 20Apr2021 09:05AM Non Ambulatory, Provider   Ordering Provider: MARIAMA LIN [id number]      Test Name Result Flag Reference   MRI L Spine without Contrast (Report)       Interpreted by: MARCIA PADILLA  04/20/21 10:12  MRN: [id number]  Patient Name: LIZZIE PHILLIPS     STUDY:  MRI L-SPINE WO; ; 4/20/2021 9:05 am     INDICATION:  M48.062 Spinal stenosis, lumbar region with neurogenic claudication.  M48.062 Spinal stenosis, lumbar region with neurogenic claudication.     COMPARISON:  12/16/2020, 04/16/2020     ACCESSION NUMBER(S):  [id number]      ORDERING CLINICIAN:  MARIAMA LIN     TECHNIQUE:  Multiplanar, multisequence imaging of the lumbar spine was performed  without intravenous contrast administration.     FINDINGS:  Moderate levoscoliosis noted. Conus medullaris terminates at the  level of L1 vertebral body. Visualized portions of the conus and the  spinal cord shows normal signal intensity and caliber.     Heterogeneous bone marrow signal intensity noted, likely related to  osteopenia/osteoporosis. Ankylosis of the L3 and L4 vertebral bodies  noted. Laminectomy changes at T11, L2, L3 and L5 levels are again  noted. Grade 1 anterolisthesis of L5 on S1, trace retrolisthesis of  L1 on L2.     Again note is made of the prompting of the nerve roots at L4-L5 level.     At T11-T12 there is moderate left paracentral disc bulge noted  causing moderate indentation of the left side of the thecal sac with  mild flattening and posterior displacement of the cord. No cord  edema. Laminectomy changes noted. Asymmetric right-sided facet joint  arthropathy, ligamentum flavum hypertrophy indenting the  posterolateral aspect of the thecal sac, there is moderate central  spinal canal stenosis. Mild encroachment of the bilateral neural  foramina.     At L1-L2 mild disc bulge, facet joint arthropathy noted causing  minimal encroachment of the spinal canal. Mild encroachment of the  bilateral neural foramina noted.     At L1-L2 scoliosis, broad-based disc osteophyte, mild facet joint  arthropathy in combination noted causing mild-to-moderate central  spinal canal stenosis, and right neural foramina narrowing. Mild  encroachment of the left neural foramina.     At L2-L3 laminectomy changes noted, scoliosis, broad-based disc  osteophyte, moderate facet joint arthropathy in combination noted  causing moderate to severe central spinal canal, severe left lateral  recess, moderate right lateral recess stenosis, severe left neural  foramina, moderate to severe right neural  foramina narrowing.     At L3-L4 there is ankylosis of the L3-L4 vertebral bodies noted.  Laminectomy changes noted. Mild facet joint arthropathy. No  significant central spinal canal or neural foramina stenosis.     At L4-L5 broad-based central disc protrusion noted, moderate facet  joint arthropathy noted causing mild encroachment of the spinal  canal, mild right neuroforaminal and mild-to-moderate left neural  narrowing.     At L5-S1 again note is made of the laminectomy changes. Moderate  facet joint arthropathy, asymmetric on the right side indenting the  posterolateral aspect of the right side of the thecal sac. No  significant central spinal canal stenosis, mild encroachment of the  bilateral neural foramina, left greater than the right.     IMPRESSION:  Postoperative, multilevel degenerative spinal canal or neural  foramina stenosis as described above, predominantly in the lower  thoracic and upper lumbar spine, worse changes are noted at L2-L3.  There is no significant change from prior.        Electronically signed by: MARCIA PADILLA  04/20/21 10:12      MRI Cervical without Contrast [id number] 11:56AM Regine Le   [Jun 2, 2020 3:40PM Rosalio Webb]  [facility] Intake Activity Log Entry by Rosalio Webb ([email or web address]) on 6/2/2020 3:37 PM  Status Change: To Confirmed - N/A   [Jun 2, 2020 3:40PM Rosalio Webb]  AMA Intake updated by Rosalio Webb ([email or web address]) on 6/2/2020 3:37 PM  New Recipient: Regine Le  New Appointment Date: May 28 2020 11:56AM   [May 27, 2020 12:33PM Regine Le]  Reason: Unspecified for MRI Cervical without Contrast      Test Name Result Flag Reference   MRI Cervical without Contrast (Report)       Interpreted by: IVETTE RAPHAEL  05/28/20 13:01  MRN: [id number]  Patient Name: LIZZIE PHILLIPS     STUDY:  MRI CERVICAL WO; 5/28/2020 11:56 am     INDICATION:  M96.1 Postlaminectomy syndrome, not elsewhere classified  M54.12  Radiculopathy, cervical region   neck pain.     COMPARISON:  March 4,  2015     ACCESSION NUMBER(S):  [id number]     ORDERING CLINICIAN:  ANH PENA     TECHNIQUE:  Sagittal T1, T2, STIR, axial T1 and axial T2 weighted images were  acquired through the cervical spine.     FINDINGS:  Alignment: There is new, grade 1 anterolisthesis of C3 on C4 and to a  lesser extent of C2 on C3. There is subtle, grade 1 anterolisthesis  of C7 on T1, T1 on T2, and T2 on T3.     Vertebrae/Intervertebral Discs: There is artifact due to anterior  fusion hardware at C5 and C6. The marrow signal is otherwise  inhomogeneous throughout on T1 and T2 weighted imaging and there are  multilevel degenerative endplate signal changes. There is new  ill-defined abnormal diminished signal on T1 and increased signal on  STIR imaging within the odontoid process and extending into the  superior aspect of the vertebral body. There are degenerative changes  of the craniocervical articulations and at the atlantodental interval.     Cord: The cervical cord is degraded by artifact particularly from  C4-5 through C6-7.     C1-C2: There is ligamentous thickening surrounding the odontoid.  There is no central canal stenosis.     C2-C3: There is no posterior disc contour abnormality. Left greater  than right degenerative facet changes do not produce significant  central canal or neural foraminal stenosis.     C3-C4: There is diffuse disc bulging, more pronounced than seen  previously. There has been marked interval progression of  degenerative facet arthropathy on the left. Abnormal signal is noted  within the left facet on STIR and T2 weighted imaging. There is also  left greater than right uncovertebral joint hypertrophy. Overall  there is severe left and mild right-sided neural foraminal stenosis.  There is no significant narrowing of the central canal.     C4-C5: Evaluation is limited due to artifact from the surgical  hardware. Left greater than right degenerative facet arthropathy is  suspected. There is a posterior  disc/osteophyte complex producing at  least partial effacement of the ventral subarachnoid space. There is  likely mild central canal stenosis and left greater than right neural  foraminal narrowing.     C5-C6: Evaluation is markedly limited due to artifact from the  surgical hardware.     C6-C7: Evaluation is markedly limited due to artifact from the  surgical hardware. There does appear to be mild disc bulging and  endplate spurring without appreciable central canal stenosis.     C7-T1: There is minimal disc bulging without central canal stenosis.  Facet and uncovertebral joint hypertrophy produce mild neural  foraminal stenosis.     T1-2: There is mild narrowing of the central canal due to disc  protrusion.     The sagittal images demonstrate disc protrusions at T2-3 and T3-4  with partial effacement of the ventral subarachnoid space.     The prevertebral and posterior paraspinous soft tissues are within  normal limits.     IMPRESSION:  1. Postoperative changes at C5 and C6. Associated artifact limits the  examination.  2. New abnormal signal within the odontoid process extending into the  superior aspect of the C2 vertebral body is nonspecific. A subacute  fracture could give this appearance. Alternatively, the signal  changes could be related to an erosive arthritis. CT of the cervical  spine may be of benefit for additional characterization.  3. Grade 1 anterolisthesis of C3 on C4 is new from 2015. Abnormal  signal within the left C3-4 facet is thought to most likely be  degenerative in etiology. An infectious or inflammatory arthritis  could also give this appearance, however.  4. Multilevel degenerative changes of the cervical and upper thoracic  spine.     An Orange alert message was sent to the referring physician Dr. ANH PENA through the [employer] system at 1:01 pm on 5/28/2020 by  Dr.Jennifer Muñoz     Electronically signed by: IVETTE MUÑOZ  05/28/20 13:01       Review of Systems   Constitutional:  Negative.    HENT: Negative.     Eyes: Negative.    Respiratory: Negative.     Cardiovascular: Negative.    Gastrointestinal: Negative.    Endocrine: Negative.    Genitourinary: Negative.    Musculoskeletal:  Positive for arthralgias, back pain, gait problem, joint swelling, myalgias and neck pain. Negative for neck stiffness.   Skin: Negative.    Allergic/Immunologic: Negative.    Neurological:  Positive for weakness and numbness. Negative for dizziness, tremors, seizures, syncope, facial asymmetry, speech difficulty, light-headedness and headaches.   Hematological: Negative.    Psychiatric/Behavioral: Negative.         Objective   Physical Exam  Vitals and nursing note reviewed.   Constitutional:       Appearance: Normal appearance.   HENT:      Head: Normocephalic and atraumatic.   Eyes:      Conjunctiva/sclera: Conjunctivae normal.   Cardiovascular:      Pulses: Normal pulses.   Pulmonary:      Effort: Pulmonary effort is normal. No respiratory distress.   Musculoskeletal:      Right lower leg: No edema.      Left lower leg: No edema.      Comments: Point tenderness over L4-L5 well-healed laminectomy scar and to the right of L4-L5.    Posture stooped.  Unable to stand erect due to pain and habitus.    Ambulates with guarded gait and rollator walker.   Skin:     General: Skin is warm and dry.      Capillary Refill: Capillary refill takes 2 to 3 seconds.   Neurological:      General: No focal deficit present.      Mental Status: She is alert and oriented to person, place, and time.      Cranial Nerves: No cranial nerve deficit.      Sensory: Sensory deficit present.      Motor: Weakness present.      Gait: Gait normal.      Comments: Allodynia to light touch L5 dermatomal distribution right posterior lateral hip and leg.    Weakness with resisted hip flexion, ankle dorsiflexion and plantarflexion 4 out of 5 both extremities.    Positive hip impingement to the right.    Negative straight leg raise.   Psychiatric:          Mood and Affect: Mood normal.         Behavior: Behavior normal.       Assessment/Plan     Follow-up 12 weeks.    Reviewed and approved by JURGEN BEAVERS on 5/15/24 at 9:16 AM.

## 2024-05-20 DIAGNOSIS — E78.2 MIXED HYPERLIPIDEMIA: ICD-10-CM

## 2024-05-20 RX ORDER — ATORVASTATIN CALCIUM 40 MG/1
40 TABLET, FILM COATED ORAL DAILY
Qty: 90 TABLET | Refills: 3 | Status: SHIPPED | OUTPATIENT
Start: 2024-05-20

## 2024-05-24 ENCOUNTER — TELEPHONE (OUTPATIENT)
Dept: RHEUMATOLOGY | Facility: CLINIC | Age: 69
End: 2024-05-24
Payer: MEDICARE

## 2024-06-25 ENCOUNTER — INFUSION (OUTPATIENT)
Dept: INFUSION THERAPY | Facility: CLINIC | Age: 69
End: 2024-06-25
Payer: MEDICARE

## 2024-06-25 VITALS
OXYGEN SATURATION: 96 % | HEART RATE: 96 BPM | DIASTOLIC BLOOD PRESSURE: 65 MMHG | TEMPERATURE: 97.7 F | SYSTOLIC BLOOD PRESSURE: 97 MMHG | RESPIRATION RATE: 18 BRPM

## 2024-06-25 DIAGNOSIS — L40.50 PSORIATIC ARTHRITIS (MULTI): Primary | ICD-10-CM

## 2024-06-25 PROCEDURE — 96365 THER/PROPH/DIAG IV INF INIT: CPT | Mod: INF | Performed by: INTERNAL MEDICINE

## 2024-06-25 PROCEDURE — 2500000004 HC RX 250 GENERAL PHARMACY W/ HCPCS (ALT 636 FOR OP/ED): Performed by: INTERNAL MEDICINE

## 2024-06-25 RX ORDER — HEPARIN 100 UNIT/ML
500 SYRINGE INTRAVENOUS AS NEEDED
Status: DISCONTINUED | OUTPATIENT
Start: 2024-06-25 | End: 2024-06-25 | Stop reason: HOSPADM

## 2024-06-25 RX ORDER — ALBUTEROL SULFATE 0.83 MG/ML
3 SOLUTION RESPIRATORY (INHALATION) AS NEEDED
OUTPATIENT
Start: 2024-07-29

## 2024-06-25 RX ORDER — DIPHENHYDRAMINE HYDROCHLORIDE 50 MG/ML
50 INJECTION INTRAMUSCULAR; INTRAVENOUS AS NEEDED
OUTPATIENT
Start: 2024-07-29

## 2024-06-25 RX ORDER — HEPARIN SODIUM,PORCINE/PF 10 UNIT/ML
50 SYRINGE (ML) INTRAVENOUS AS NEEDED
OUTPATIENT
Start: 2024-06-25

## 2024-06-25 RX ORDER — FAMOTIDINE 10 MG/ML
20 INJECTION INTRAVENOUS ONCE AS NEEDED
OUTPATIENT
Start: 2024-07-29

## 2024-06-25 RX ORDER — HEPARIN 100 UNIT/ML
500 SYRINGE INTRAVENOUS AS NEEDED
OUTPATIENT
Start: 2024-06-25

## 2024-06-25 RX ORDER — EPINEPHRINE 0.3 MG/.3ML
0.3 INJECTION SUBCUTANEOUS EVERY 5 MIN PRN
OUTPATIENT
Start: 2024-07-29

## 2024-06-25 NOTE — PATIENT INSTRUCTIONS
Today :We administered golimumab (Simponi Aria) 137.5 mg in sodium chloride 0.9% 100 mL IV.     For:   1. Psoriatic arthritis (Multi)         Your next appointment is due in:  8/20/24        Please read the  Medication Guide that was given to you and reviewed during todays visit.     (Tell all doctors including dentists that you are taking this medication)     Go to the emergency room or call 911 if:  -You have signs of allergic reaction:   -Rash, hives, itching.   -Swollen, blistered, peeling skin.   -Swelling of face, lips, mouth, tongue or throat.   -Tightness of chest, trouble breathing, swallowing or talking     Call your doctor:  - If IV / injection site gets red, warm, swollen, itchy or leaks fluid or pus.     (Leave dressing on your IV site for at least 2 hours and keep area clean and dry  - If you get sick or have symptoms of infection or are not feeling well for any reason.    (Wash your hands often, stay away from people who are sick)  - If you have side effects from your medication that do not go away or are bothersome.     (Refer to the teaching your nurse gave you for side effects to call your doctor about)    - Common side effects may include:  stuffy nose, headache, feeling tired, muscle aches, upset stomach  - Before receiving any vaccines     - Call the [facility] at   If:  - You get sick, are on antibiotics, have had a recent vaccine, have surgery or dental work and your doctor wants your visit rescheduled.  - You need to cancel and reschedule your visit for any reason. Call at least 2 days before your visit if you need to cancel.   - Your insurance changes before your next visit.    (We will need to get approval from your new insurance. This can take up to two weeks.)     The [facility] is opened Monday thru Friday. We are closed on weekends and holidays.   Voice mail will take your call if the center is closed. If you leave a message please allow 24 hours for a  call back during weekdays. If you leave a message on a weekend/holiday, we will call you back the next business day.

## 2024-06-25 NOTE — PROGRESS NOTES
[facility]   Infusion Clinic Note   Date: 2024   Name: Nesha Mcleod  : 1955   MRN: [id number]         Reason for Visit: No chief complaint on file.         Today: We administered golimumab (Simponi Aria) 137.5 mg in sodium chloride 0.9% 100 mL IV and heparin flush.       Visit Type: INFUSION       Ordered By: Lumapas      Accompanied by:Self      Diagnosis: Psoriatic arthritis (Multi)       Allergies:   Allergies as of 2024 - Reviewed 05/15/2024   Allergen Reaction Noted    Methotrexate Swelling 10/11/2023    Trazodone Other 10/11/2023    Adhesive tape-silicones Hives 10/11/2023    Bleach (sodium hypochlorite) Hives, Unknown, and Itching 2016    Chlorine Unknown and Other 10/11/2023    Ciprofloxacin Other and Unknown 2011    Citalopram Other 10/11/2023    Codeine Hives and Rash 2011    Doxycycline Nausea And Vomiting and Unknown 10/11/2023    Latex Hives and Unknown 2011    Morphine Hives and Rash 2011    Penicillins Hives 10/11/2023    Soap Unknown and Hives 10/11/2023    Tetracycline Hives 10/11/2023    Amitriptyline Other 2011    Grass pollen Itching 2016    Infliximab Unknown 10/11/2023    Other Hives and Unknown 10/11/2023    Rubber, unspecified Unknown 10/11/2023    Trace metals Unknown 10/11/2023    Weed pollen Itching 2016    Methocarbamol Rash and Unknown 10/11/2023    Nickel Rash 10/11/2023    Sulfa (sulfonamide antibiotics) Nausea/vomiting and GI Upset 2011         Current Medications has a current medication list which includes the following prescription(s): acetaminophen, aspirin, atorvastatin, bupropion sr, cinnamon bark extract, cyclobenzaprine, docusate sodium, furosemide, gabapentin, golimumab, hydromorphone, hydromorphone, [START ON 2024] hydromorphone, leflunomide, levothyroxine, lisinopril, magnesium oxide, metformin, metformin, multivit-minerals/folic acid, naloxone, onetouch ultra test,  "potassium gluconate, prednisone, and tresiba flextouch u-100.       Vitals:   There were no vitals filed for this visit.          Infusion Pre-procedure Checklist:   - Allergies reviewed: yes   - Medications reviewed: yes       - Previous reaction to current treatment: no      Assess patient for the concerns below. Document provider notification as appropriate.  - Active or recent infection with/without current antibiotic use: no  - Recent or planned invasive dental work: no  - Recent or planned surgeries: no  - Recently received or plans to receive vaccinations: no  - Has treatment related toxicities: no  - Is pregnant:  no      Pain: 0   - Is the pain different from normal: no   - Is the pain tolerable: no   - Is your Doctor aware:  no      Labs: N/A         Fall Risk Screening:         Review Of Systems:  Review of Systems   All other systems reviewed and are negative.        Infusion Readiness:   - Assessment Concerns Related to Infusion: No  - Provider notified: no      Document Below Only If Indicated:   New Patient Education:    N/A (returning patient for continuation of therapy. Ongoing education provided as needed.)        Treatment Conditions & Drug Specific Questions:    Golimumab  (SIMPONI)    (Unless otherwise specified on patient specific therapy plan):     TREATMENT CONDITIONS:  Unless otherwise specified on patient specific thearpy plan HOLD and notify provider prior to proceeding with today's infusion if patient with:  o Positive T-Spot  o Positive Hepatitis B Surface Ag    Lab Results   Component Value Date    TBSIN Negative 07/20/2022    QFG NEGATIVE 07/30/2018      No results found for: \"HAGCN\", \"HEPBSURABI\", \"HBSAG\", \"XHAGF\", \"HEPBSAG\", \"EXTHEPBSAG\", \"NONUHSWGH\"   No results found for: \"NONUHFIRE\", \"NONUHSWGH\", \"NONUHFISH\", \"EXTHEPBSAG\"  No results found for: \"HEPATOT\", \"HEPAIGM\", \"HEPBCIGM\", \"HEPBCAB\", \"HBEAG\", \"HEPCAB\"   No results found for: \"HEPCAB\"  No results found for: \"HBCTI\", " "\"HEPBCAB\"    Lab Results   Component Value Date    WBC 5.0 04/30/2024    HGB 11.4 (L) 04/30/2024    HCT 36.6 04/30/2024    MCV 99 04/30/2024     04/30/2024        Labs reviewed and patient meets treatment conditions? Yes    DRUG SPECIFIC QUESTIONS:   - Up to date on all immunizations? Yes    Immunization History   Administered Date(s) Administered    DTaP, Unspecified 12/05/2019    Flu vaccine (IIV4), preservative free *Check age/dose* 09/28/2013, 09/14/2016, 10/06/2020    Influenza, High Dose Seasonal, Preservative Free 09/04/2019, 09/09/2023    Influenza, seasonal, injectable 10/09/2014, 10/26/2015    Novel influenza-H1N1-09, preservative-free 12/23/2009    Pfizer Gray Cap SARS-CoV-2 04/25/2022    Pfizer Purple Cap SARS-CoV-2 01/17/2021, 03/27/2021, 08/20/2021    Pneumococcal conjugate vaccine, 20-valent (PREVNAR 20) 05/27/2022    Pneumococcal polysaccharide vaccine, 23-valent, age 2 years and older (PNEUMOVAX 23) 10/13/2014, 01/13/2017    Tdap vaccine, age 7 year and older (BOOSTRIX, ADACEL) 05/27/2022    Zoster, live 11/03/2012         - Any history of or new or worsening s/s of heart failure? No  (If worsening s/s notify provider prior to proceeding. Simponi may cause   exacerbation of heart failure)     - Any history of cancer, especially lymphomas? No    (Box Warning: Malignancy)      REMINDER:  WEIGHT BASED DRUG    Recommended Vitals/Observation:  Vitals: Take vital signs prior to starting infusion, at infusion conclusion and as needed.   Observation: No observation.        Weight Based Drug Calculations:    WEIGHT BASED DRUGS: NOT APPLICABLE / FLAT DOSE          Initiated By: Gunjan Arias, MAURA   Pt tolerated well      "

## 2024-07-01 DIAGNOSIS — E06.3 AUTOIMMUNE THYROIDITIS: ICD-10-CM

## 2024-07-01 DIAGNOSIS — I10 ESSENTIAL (PRIMARY) HYPERTENSION: ICD-10-CM

## 2024-07-01 DIAGNOSIS — R60.0 LOWER EXTREMITY EDEMA: ICD-10-CM

## 2024-07-01 DIAGNOSIS — F41.9 ANXIETY: ICD-10-CM

## 2024-07-01 RX ORDER — BUPROPION HYDROCHLORIDE 100 MG/1
100 TABLET, EXTENDED RELEASE ORAL DAILY
Qty: 90 TABLET | Refills: 3 | Status: SHIPPED | OUTPATIENT
Start: 2024-07-01

## 2024-07-01 RX ORDER — LISINOPRIL 20 MG/1
20 TABLET ORAL DAILY
Qty: 90 TABLET | Refills: 3 | Status: SHIPPED | OUTPATIENT
Start: 2024-07-01

## 2024-07-01 RX ORDER — LEVOTHYROXINE SODIUM 75 UG/1
75 TABLET ORAL
Qty: 90 TABLET | Refills: 3 | Status: SHIPPED | OUTPATIENT
Start: 2024-07-01

## 2024-07-01 RX ORDER — FUROSEMIDE 40 MG/1
40 TABLET ORAL DAILY
Qty: 90 TABLET | Refills: 3 | Status: SHIPPED | OUTPATIENT
Start: 2024-07-01

## 2024-07-11 ENCOUNTER — TELEPHONE (OUTPATIENT)
Dept: PRIMARY CARE | Facility: CLINIC | Age: 69
End: 2024-07-11
Payer: MEDICARE

## 2024-07-11 NOTE — TELEPHONE ENCOUNTER
Patient returned call to office, appointment confirmed.  COVID Screening negative, verbal consent obtained while on the phone with this nurse.

## 2024-07-12 ENCOUNTER — OFFICE VISIT (OUTPATIENT)
Dept: PRIMARY CARE | Facility: CLINIC | Age: 69
End: 2024-07-12
Payer: MEDICARE

## 2024-07-12 VITALS
SYSTOLIC BLOOD PRESSURE: 90 MMHG | TEMPERATURE: 97.3 F | OXYGEN SATURATION: 94 % | BODY MASS INDEX: 34.07 KG/M2 | HEART RATE: 93 BPM | DIASTOLIC BLOOD PRESSURE: 50 MMHG | RESPIRATION RATE: 16 BRPM | WEIGHT: 163 LBS

## 2024-07-12 DIAGNOSIS — L68.0 HIRSUTISM: Primary | ICD-10-CM

## 2024-07-12 DIAGNOSIS — E11.9 TYPE 2 DIABETES MELLITUS WITHOUT COMPLICATION, WITH LONG-TERM CURRENT USE OF INSULIN (MULTI): ICD-10-CM

## 2024-07-12 DIAGNOSIS — I10 ESSENTIAL (PRIMARY) HYPERTENSION: ICD-10-CM

## 2024-07-12 DIAGNOSIS — E06.3 AUTOIMMUNE THYROIDITIS: ICD-10-CM

## 2024-07-12 DIAGNOSIS — L40.50 PSORIATIC ARTHRITIS (MULTI): ICD-10-CM

## 2024-07-12 DIAGNOSIS — E78.2 MIXED HYPERLIPIDEMIA: ICD-10-CM

## 2024-07-12 DIAGNOSIS — Z79.4 TYPE 2 DIABETES MELLITUS WITHOUT COMPLICATION, WITH LONG-TERM CURRENT USE OF INSULIN (MULTI): ICD-10-CM

## 2024-07-12 RX ORDER — SPIRONOLACTONE 50 MG/1
50 TABLET, FILM COATED ORAL DAILY
Qty: 90 TABLET | Refills: 1 | Status: SHIPPED | OUTPATIENT
Start: 2024-07-12 | End: 2025-01-08

## 2024-07-12 ASSESSMENT — ENCOUNTER SYMPTOMS
ARTHRALGIAS: 1
LIGHT-HEADEDNESS: 1
BACK PAIN: 1
FATIGUE: 1
MYALGIAS: 1
NECK STIFFNESS: 1
CARDIOVASCULAR NEGATIVE: 1
WOUND: 1
EYES NEGATIVE: 1
RESPIRATORY NEGATIVE: 1
NECK PAIN: 1
GASTROINTESTINAL NEGATIVE: 1
ENDOCRINE NEGATIVE: 1
TREMORS: 1
HEMATOLOGIC/LYMPHATIC NEGATIVE: 1
ACTIVITY CHANGE: 1
PSYCHIATRIC NEGATIVE: 1

## 2024-07-12 ASSESSMENT — PAIN SCALES - GENERAL: PAINLEVEL: 9

## 2024-07-12 NOTE — PROGRESS NOTES
Subjective   Patient ID: Nesha Mcleod is a 69 y.o. female who is being seen for [facility] 2 month house calls follow up.     HPI Pt seen in single family apartment unaccompanied. PMHx: Chronic pain, DDD, HTN, HLD, DM, Hypothyroid, Psoriatic arthritis, GERD. Pt seen sitting in chair with legs dependent. Pt alert and oriented and cooperative with examination. Pt with c/o right hip pain 9/10. Didn't take pain pill this am as she has to drive to a [employer] service today. Pt received a cortisone injection in hip a month ago with some relief that pt states is now wearing off. Pt also with left shoulder pain she applies lidocaine patches to when hurts. Pt ambulates with Rolator and denies recent falls. Pt with c/o increased hair growth on face and that is vey bothersome and grows over night. Pt having a hard time reaching up to pluck or shave the hair. Pt requesting something to assist with decreasing the hair growth. Pt going to start meal on wheels on Monday as she no longer likes to cook and has difficulty doing so. Pt reprots 11 pound weight gain over last 4 months and is hoping the prepared portion size will assist with this as pts appetite is too good and she always wants to eat. Pt reports energy as having none. Sleep reported as good. Pt denies fever, chills, night sweats, or headaches, admits to lightheadedness when bending over. Denies trouble chewing or swallowing. Denies SOB, cough, CP or palpitaitons. Denies N/V/D or constiptaion, reprots bowel movement daily. Denies urianry symptoms.   Pt reports checking blood sugar daily. Results running 130-175. States the high numbers are on days she knows she is not eating right.  Pt with no other complaints or concerns.   Home Visit medically necessary due to: pt has chronic condition that makes access to a traditional office visit very difficult, illness or condition that results in activity limitation or restriction that impacts the ability to leave home such as;  unsteady gait/ poor condition.    Review of Systems   Constitutional:  Positive for activity change and fatigue.   HENT: Negative.     Eyes: Negative.    Respiratory: Negative.     Cardiovascular: Negative.    Gastrointestinal: Negative.    Endocrine: Negative.    Genitourinary: Negative.    Musculoskeletal:  Positive for arthralgias (shoulder, hips, knees), back pain, gait problem, myalgias, neck pain and neck stiffness.   Skin:  Positive for wound.   Allergic/Immunologic: Positive for environmental allergies.   Neurological:  Positive for tremors and light-headedness (when bending over).   Hematological: Negative.    Psychiatric/Behavioral: Negative.         Objective   BP 90/50 (BP Location: Left arm, Patient Position: Sitting, BP Cuff Size: Adult)   Pulse 93   Temp 36.3 °C (97.3 °F) (Temporal)   Resp 16   Wt 73.9 kg (163 lb)   SpO2 94%   BMI 34.07 kg/m²     Current Outpatient Medications:     acetaminophen (Tylenol) 325 mg tablet, Take 2 tablets (650 mg) by mouth every 4 hours if needed for mild pain (1 - 3), headaches or fever (temp greater than 38.0 C). THIS ORDER IS SUPERSEDED BY ANY OTHER MEDICATION ORDER FOR MILD PAIN (1-3). MAY BE USED FOR A HIGHER PAIN SCORE IF PATIENT REQUESTS, Disp: , Rfl:     aspirin 81 mg chewable tablet, Chew 1 tablet (81 mg) once daily. HOLD IF GIVEN IN LAST 24 HOURS OR IF HISTORY OF HYPERSENSITIVITY., Disp: , Rfl:     atorvastatin (Lipitor) 40 mg tablet, TAKE 1 TABLET BY MOUTH ONCE  DAILY, Disp: 90 tablet, Rfl: 3    buPROPion SR (Wellbutrin SR) 100 mg 12 hr tablet, TAKE 1 TABLET BY MOUTH ONCE  DAILY DO NOT CRUSH, CHEW, OR  SPLIT, Disp: 90 tablet, Rfl: 3    cinnamon bark extract 500 mg tablet, Cinnamon 500 MG as directed Orally daily Active, Disp: , Rfl:     cyclobenzaprine (Flexeril) 10 mg tablet, Take 1 tablet (10 mg) by mouth 2 times a day as needed for muscle spasms., Disp: 60 tablet, Rfl: 5    docusate sodium (Dulcolax Stool Softener, dss,) 100 mg capsule, Take 1 capsule  (100 mg) by mouth 2 times a day as needed., Disp: , Rfl:     furosemide (Lasix) 40 mg tablet, TAKE 1 TABLET BY MOUTH ONCE  DAILY, Disp: 90 tablet, Rfl: 3    gabapentin (Neurontin) 300 mg capsule, Take 1 capsule (300 mg) by mouth 2 times a day., Disp: 60 capsule, Rfl: 5    golimumab (Simponi) 50 mg/0.5 mL pen injector, Inject under the skin once daily., Disp: , Rfl:     HYDROmorphone (Dilaudid) 2 mg tablet, Take 1 tablet (2 mg) by mouth 3 times a day as needed for severe pain (7 - 10) for up to 28 days. Do not fill before June 19, 2024., Disp: 84 tablet, Rfl: 0    [START ON 7/17/2024] HYDROmorphone (Dilaudid) 2 mg tablet, Take 1 tablet (2 mg) by mouth 3 times a day as needed for severe pain (7 - 10) for up to 28 days. Do not fill before July 17, 2024., Disp: 84 tablet, Rfl: 0    leflunomide (Arava) 10 mg tablet, Take 1 tablet (10 mg) by mouth once daily., Disp: 90 tablet, Rfl: 3    levothyroxine (Synthroid, Levoxyl) 75 mcg tablet, TAKE 1 TABLET BY MOUTH ONCE  DAILY IN THE MORNING BEFORE A  MEAL, Disp: 90 tablet, Rfl: 3    lisinopril 20 mg tablet, TAKE 1 TABLET BY MOUTH ONCE  DAILY, Disp: 90 tablet, Rfl: 3    magnesium oxide (Mag-Ox) 250 mg magnesium tablet, Take 1 tablet (250 mg) by mouth once daily., Disp: , Rfl:     metFORMIN (Glucophage) 500 mg tablet, Take 1 tablet (500 mg) by mouth 2 times a day with meals., Disp: 180 tablet, Rfl: 3    multivit-minerals/folic acid (CENTRUM ADULTS ORAL), Take by mouth once daily., Disp: , Rfl:     naloxone (Narcan) 4 mg/0.1 mL nasal spray, USE AS DIRECTED, Disp: , Rfl:     OneTouch Ultra Test strip, Apply 1 strip topically once daily., Disp: 100 strip, Rfl: 3    potassium gluconate 595 mg (99 mg) tablet, Take 1 tablet by mouth once daily., Disp: , Rfl:     predniSONE (Deltasone) 10 mg tablet, Take 1 tablet (10 mg) by mouth once daily. (Patient taking differently: Take 1 tablet (10 mg) by mouth continuously if needed.), Disp: 90 tablet, Rfl: 1    Tresiba FlexTouch U-100 100  unit/mL (3 mL) injection, Inject 8 Units under the skin once daily in the morning., Disp: 9 mL, Rfl: 3    HYDROmorphone (Dilaudid) 2 mg tablet, Take 1 tablet (2 mg) by mouth 3 times a day as needed for severe pain (7 - 10) for up to 28 days. Do not fill before May 22, 2024., Disp: 84 tablet, Rfl: 0    metFORMIN (Glucophage) 500 mg tablet, Take 1 tablet (500 mg) by mouth 2 times a day with meals for 14 days., Disp: 28 tablet, Rfl: 0    Physical Exam  Constitutional:       Appearance: Normal appearance. She is obese.   HENT:      Head: Normocephalic and atraumatic.      Nose: Nose normal.      Mouth/Throat:      Mouth: Mucous membranes are moist.      Pharynx: Oropharynx is clear.   Eyes:      Conjunctiva/sclera: Conjunctivae normal.      Pupils: Pupils are equal, round, and reactive to light.   Abdominal:      General: Bowel sounds are normal.      Palpations: Abdomen is soft.   Musculoskeletal:         General: Tenderness (hips knees shoulders) present.      Cervical back: Normal range of motion and neck supple. Tenderness present.   Skin:     General: Skin is warm and dry.      Capillary Refill: Capillary refill takes less than 2 seconds.      Findings: Rash: ..      Comments: Scabbed to left hand from bug bite.  Bandage to right anterior leg   Neurological:      Mental Status: She is alert and oriented to person, place, and time.      Motor: Weakness present.      Gait: Gait abnormal.   Psychiatric:         Mood and Affect: Mood normal.         Behavior: Behavior normal.         Thought Content: Thought content normal.         Judgment: Judgment normal.     Patient Active Problem List   Diagnosis    Psoriatic arthritis (Multi)    Overactive bladder    Myalgia    Soft tissue mass    Neurogenic claudication due to lumbar spinal stenosis    Acquired partial lipodystrophy    Acute cystitis without hematuria    Acute renal failure syndrome (CMS-HCC)    ALT (SGPT) level raised    Altered mental status    Anal sphincter  incontinence    Anxiety    Arthritis    Arthritis of neck    Autoimmune thyroiditis    Blepharitis    Blurred vision, bilateral    Cataract, nuclear sclerotic, both eyes    Cellulitis    Cervical radicular pain    Pain, radicular, lumbar    Choroidal nevus, left eye    Chronic intractable pain    Chronic low back pain    Clouded consciousness    Colovaginal fistula    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    Consecutive exotropia    Change in bowel habits    Constipation    Contact dermatitis    Controlled insulin dependent type 1 diabetes mellitus (Multi)    Type 2 diabetes mellitus (Multi)    DDD (degenerative disc disease), thoracic    Dermatochalasis    Difficulty walking    Disorder of refraction    Disturbance, visual, subjective    Diverticulosis of colon    Dry eyes    Early cataract    Elevated creatine kinase    Essential (primary) hypertension    Fecal soiling due to fecal incontinence    Fistula involving female genital tract    Fracture of cervical vertebra (Multi)    Gastroesophageal reflux disease    Generalized osteoarthritis of multiple sites    Hemorrhoids without complication    Iron deficiency anemia    Leukopenia    Lumbar radiculitis    Mixed hyperlipidemia    Myofascial pain    Numbness    Obesity with body mass index 30 or greater    Palsy of conjugate gaze    Poor venous access    Incisional hernia    Postlaminectomy syndrome, lumbar region    Pseudophakia of both eyes    Psoriasis    Recurrent urinary tract infection    Retinopathy, background, nonproliferative, mild    Skin excoriation    Swelling of upper arm    Vaginal disorder    Weakness generalized     Preventative Medicine Counseling: Medication Education: Education: Current medication list reviewed and discussed, understanding, patient expressed understanding, adherence: No barriers to adherence identified.      Assessment/Plan   Diagnoses and all orders for this visit:  Hirsutism  Comments:  Start Spironolactone 50 mg po  daily  Check potassium level in 1 month.  Orders:  -     spironolactone (Aldactone) 50 mg tablet; Take 1 tablet (50 mg) by mouth once daily.  Essential (primary) hypertension  Comments:  Continue Lisinopril 20 mg po daily  Mixed hyperlipidemia  Comments:  Continue Lipitor 40 mg po daily.  Type 2 diabetes mellitus without complication, with long-term current use of insulin (Multi)  Comments:  Continue Metformin 500 mg op bid.  Continue Tresiba 8 units every moring.  Continue blood sugar monitoring daily.  Autoimmune thyroiditis  Comments:  Continue Levothyroxine 75 mcg po daily.  Psoriatic arthritis (Multi)  Comments:  Continue Simponi injections     Follow up in 1 month with labs or prn  ANYA Fierro-CNP

## 2024-07-25 ENCOUNTER — TELEPHONE (OUTPATIENT)
Dept: PRIMARY CARE | Facility: CLINIC | Age: 69
End: 2024-07-25
Payer: MEDICARE

## 2024-07-25 NOTE — TELEPHONE ENCOUNTER
Pt states she cannot take water pill prescribed. She states she has had incontinence with new medication. She states she stopped taking new water pill and started taking old water pill (lasix) 07/18/2024.

## 2024-08-08 ENCOUNTER — TELEPHONE (OUTPATIENT)
Dept: PRIMARY CARE | Facility: CLINIC | Age: 69
End: 2024-08-08
Payer: MEDICARE

## 2024-08-09 ENCOUNTER — OFFICE VISIT (OUTPATIENT)
Dept: PRIMARY CARE | Facility: CLINIC | Age: 69
End: 2024-08-09
Payer: MEDICARE

## 2024-08-09 VITALS
OXYGEN SATURATION: 92 % | DIASTOLIC BLOOD PRESSURE: 60 MMHG | TEMPERATURE: 96.5 F | RESPIRATION RATE: 16 BRPM | HEART RATE: 91 BPM | SYSTOLIC BLOOD PRESSURE: 90 MMHG | WEIGHT: 163 LBS | BODY MASS INDEX: 34.07 KG/M2

## 2024-08-09 DIAGNOSIS — E11.9 TYPE 2 DIABETES MELLITUS WITHOUT COMPLICATION, WITH LONG-TERM CURRENT USE OF INSULIN (MULTI): ICD-10-CM

## 2024-08-09 DIAGNOSIS — R20.0 NUMBNESS: ICD-10-CM

## 2024-08-09 DIAGNOSIS — G89.29 CHRONIC INTRACTABLE PAIN: ICD-10-CM

## 2024-08-09 DIAGNOSIS — Z79.4 TYPE 2 DIABETES MELLITUS WITHOUT COMPLICATION, WITH LONG-TERM CURRENT USE OF INSULIN (MULTI): ICD-10-CM

## 2024-08-09 DIAGNOSIS — I10 ESSENTIAL (PRIMARY) HYPERTENSION: Primary | ICD-10-CM

## 2024-08-09 DIAGNOSIS — E06.3 AUTOIMMUNE THYROIDITIS: ICD-10-CM

## 2024-08-09 LAB
ANION GAP SERPL CALC-SCNC: 12 MMOL/L
BUN SERPL-MCNC: 60 MG/DL (ref 8–25)
CALCIUM SERPL-MCNC: 9.5 MG/DL (ref 8.5–10.4)
CHLORIDE SERPL-SCNC: 99 MMOL/L (ref 97–107)
CO2 SERPL-SCNC: 27 MMOL/L (ref 24–31)
CREAT SERPL-MCNC: 1.6 MG/DL (ref 0.4–1.6)
EGFRCR SERPLBLD CKD-EPI 2021: 35 ML/MIN/1.73M*2
ERYTHROCYTE [DISTWIDTH] IN BLOOD BY AUTOMATED COUNT: 13.1 % (ref 11.5–14.5)
EST. AVERAGE GLUCOSE BLD GHB EST-MCNC: 123 MG/DL
GLUCOSE SERPL-MCNC: 121 MG/DL (ref 65–99)
HBA1C MFR BLD: 5.9 %
HCT VFR BLD AUTO: 35.5 % (ref 36–46)
HGB BLD-MCNC: 11.2 G/DL (ref 12–16)
MCH RBC QN AUTO: 30.6 PG (ref 26–34)
MCHC RBC AUTO-ENTMCNC: 31.5 G/DL (ref 32–36)
MCV RBC AUTO: 97 FL (ref 80–100)
NRBC BLD-RTO: 0 /100 WBCS (ref 0–0)
PLATELET # BLD AUTO: 219 X10*3/UL (ref 150–450)
POTASSIUM SERPL-SCNC: 5 MMOL/L (ref 3.4–5.1)
RBC # BLD AUTO: 3.66 X10*6/UL (ref 4–5.2)
SODIUM SERPL-SCNC: 138 MMOL/L (ref 133–145)
TSH SERPL DL<=0.05 MIU/L-ACNC: 1.92 MIU/L (ref 0.27–4.2)
WBC # BLD AUTO: 4.6 X10*3/UL (ref 4.4–11.3)

## 2024-08-09 PROCEDURE — 83036 HEMOGLOBIN GLYCOSYLATED A1C: CPT

## 2024-08-09 PROCEDURE — 84443 ASSAY THYROID STIM HORMONE: CPT

## 2024-08-09 PROCEDURE — 80048 BASIC METABOLIC PNL TOTAL CA: CPT

## 2024-08-09 PROCEDURE — 36415 COLL VENOUS BLD VENIPUNCTURE: CPT

## 2024-08-09 PROCEDURE — 85027 COMPLETE CBC AUTOMATED: CPT

## 2024-08-09 ASSESSMENT — ENCOUNTER SYMPTOMS
FATIGUE: 1
NECK STIFFNESS: 1
ENDOCRINE NEGATIVE: 1
EYES NEGATIVE: 1
WEAKNESS: 1
ACTIVITY CHANGE: 1
FREQUENCY: 1
CARDIOVASCULAR NEGATIVE: 1
RESPIRATORY NEGATIVE: 1
ARTHRALGIAS: 1
PSYCHIATRIC NEGATIVE: 1
APPETITE CHANGE: 1
BACK PAIN: 1
CONSTIPATION: 1
MYALGIAS: 1
NECK PAIN: 1
ALLERGIC/IMMUNOLOGIC NEGATIVE: 1

## 2024-08-09 ASSESSMENT — PAIN SCALES - GENERAL: PAINLEVEL: 8

## 2024-08-09 NOTE — PROGRESS NOTES
"Subjective   Patient ID: Nesha Mcleod is a 69 y.o. female who present for one month follow up.     HPI Pt seen in single family apartment unaccompanied. PMHx: Chronic pain, DDD, HTN, HLD, DM, Hypothyroid, Psoriatic arthritis, GERD. Pt seen sitting in chair with legs dependent. Pt alert and oriented and cooperative with examination. Pt with complaints of back pain 8/10. Pt hasn't taken her pain medications today yet as she is planning on going out to the store. Pt started receiving meals on wheels and is liking it so far. Pt tried taking the Aldactone for hirsutism without success because it ws causing her to urinate too much ans have incontinence accidents and she did not want to deal with. Pt fell two weeks ago in her living room while walking with her rolator and her left leg gave out. Pt said then the walker tipped over and injured her back but did not seek medical treatment.   Blood sugars have been running in the 170-180's. Pt has been taking her Tresiba which she only takes when she has a blood sugar above 180. Pt admits to eating corn on the cob and fresh peaches. Pt denies any s/s of hyper/hypoglycemia. Pt not following diabetic diet. Pt had an ingrown toenail removed this week and is causing her pain. Pt reports energy is non existent and states she just doesn't have any. Appetite is reported as being \"over the top\". Pt reports sleep as good with the pills she takes. Pt denies fever, chills, night sweats, dizziness or headaches. Pt denies trouble chewing or swallowing. Denies SOB, cough. CP or palpitations. Pt denies N/V/D, reports constipation admits to bowel movement daily. Pt admits to urinary frequency, denies dysuria. Pt with c/o all over joint and muscle pain. Pt reports she is going to need another cortisone injection into hip soon. Pt with no other complaints or concerns at present.   Home Visit medically necessary due to: pt has chronic condition that makes access to a traditional office visit " very difficult, illness or condition that results in activity limitation or restriction that impacts the ability to leave home such as; unsteady gait/ poor condition.      Review of Systems   Constitutional:  Positive for activity change, appetite change and fatigue.   HENT: Negative.     Eyes: Negative.    Respiratory: Negative.     Cardiovascular: Negative.    Gastrointestinal:  Positive for constipation.   Endocrine: Negative.    Genitourinary:  Positive for frequency and urgency.   Musculoskeletal:  Positive for arthralgias, back pain, gait problem, myalgias, neck pain and neck stiffness.   Skin: Negative.    Allergic/Immunologic: Negative.    Neurological:  Positive for weakness.   Psychiatric/Behavioral: Negative.         Objective   BP 90/60 (BP Location: Right arm, Patient Position: Sitting, BP Cuff Size: Adult)   Pulse 91   Temp 35.8 °C (96.5 °F) (Temporal)   Resp 16   Wt 73.9 kg (163 lb)   SpO2 92%   BMI 34.07 kg/m²     Current Outpatient Medications:     acetaminophen (Tylenol) 325 mg tablet, Take 2 tablets (650 mg) by mouth every 4 hours if needed for mild pain (1 - 3), headaches or fever (temp greater than 38.0 C). THIS ORDER IS SUPERSEDED BY ANY OTHER MEDICATION ORDER FOR MILD PAIN (1-3). MAY BE USED FOR A HIGHER PAIN SCORE IF PATIENT REQUESTS, Disp: , Rfl:     aspirin 81 mg chewable tablet, Chew 1 tablet (81 mg) once daily. HOLD IF GIVEN IN LAST 24 HOURS OR IF HISTORY OF HYPERSENSITIVITY., Disp: , Rfl:     atorvastatin (Lipitor) 40 mg tablet, TAKE 1 TABLET BY MOUTH ONCE  DAILY, Disp: 90 tablet, Rfl: 3    buPROPion SR (Wellbutrin SR) 100 mg 12 hr tablet, TAKE 1 TABLET BY MOUTH ONCE  DAILY DO NOT CRUSH, CHEW, OR  SPLIT, Disp: 90 tablet, Rfl: 3    cinnamon bark extract 500 mg tablet, Cinnamon 500 MG as directed Orally daily Active, Disp: , Rfl:     cyclobenzaprine (Flexeril) 10 mg tablet, Take 1 tablet (10 mg) by mouth 2 times a day as needed for muscle spasms., Disp: 60 tablet, Rfl: 5    docusate  sodium (Dulcolax Stool Softener, dss,) 100 mg capsule, Take 1 capsule (100 mg) by mouth 2 times a day as needed., Disp: , Rfl:     furosemide (Lasix) 40 mg tablet, TAKE 1 TABLET BY MOUTH ONCE  DAILY, Disp: 90 tablet, Rfl: 3    gabapentin (Neurontin) 300 mg capsule, Take 1 capsule (300 mg) by mouth 2 times a day., Disp: 60 capsule, Rfl: 5    golimumab (Simponi) 50 mg/0.5 mL pen injector, Inject under the skin once daily., Disp: , Rfl:     HYDROmorphone (Dilaudid) 2 mg tablet, Take 1 tablet (2 mg) by mouth 3 times a day as needed for severe pain (7 - 10) for up to 28 days. Do not fill before July 17, 2024., Disp: 84 tablet, Rfl: 0    leflunomide (Arava) 10 mg tablet, Take 1 tablet (10 mg) by mouth once daily., Disp: 90 tablet, Rfl: 3    levothyroxine (Synthroid, Levoxyl) 75 mcg tablet, TAKE 1 TABLET BY MOUTH ONCE  DAILY IN THE MORNING BEFORE A  MEAL, Disp: 90 tablet, Rfl: 3    lisinopril 20 mg tablet, TAKE 1 TABLET BY MOUTH ONCE  DAILY, Disp: 90 tablet, Rfl: 3    magnesium oxide (Mag-Ox) 250 mg magnesium tablet, Take 1 tablet (250 mg) by mouth once daily., Disp: , Rfl:     metFORMIN (Glucophage) 500 mg tablet, Take 1 tablet (500 mg) by mouth 2 times a day with meals., Disp: 180 tablet, Rfl: 3    multivit-minerals/folic acid (CENTRUM ADULTS ORAL), Take by mouth once daily., Disp: , Rfl:     naloxone (Narcan) 4 mg/0.1 mL nasal spray, USE AS DIRECTED, Disp: , Rfl:     OneTouch Ultra Test strip, Apply 1 strip topically once daily., Disp: 100 strip, Rfl: 3    potassium gluconate 595 mg (99 mg) tablet, Take 1 tablet by mouth once daily., Disp: , Rfl:     predniSONE (Deltasone) 10 mg tablet, Take 1 tablet (10 mg) by mouth once daily. (Patient taking differently: Take 1 tablet (10 mg) by mouth continuously if needed.), Disp: 90 tablet, Rfl: 1    Tresiba FlexTouch U-100 100 unit/mL (3 mL) injection, Inject 8 Units under the skin once daily in the morning., Disp: 9 mL, Rfl: 3    HYDROmorphone (Dilaudid) 2 mg tablet, Take 1  tablet (2 mg) by mouth 3 times a day as needed for severe pain (7 - 10) for up to 28 days. Do not fill before May 22, 2024., Disp: 84 tablet, Rfl: 0    HYDROmorphone (Dilaudid) 2 mg tablet, Take 1 tablet (2 mg) by mouth 3 times a day as needed for severe pain (7 - 10) for up to 28 days. Do not fill before June 19, 2024., Disp: 84 tablet, Rfl: 0    metFORMIN (Glucophage) 500 mg tablet, Take 1 tablet (500 mg) by mouth 2 times a day with meals for 14 days., Disp: 28 tablet, Rfl: 0    Physical Exam  Constitutional:       Appearance: Normal appearance. She is normal weight.   HENT:      Head: Normocephalic and atraumatic.      Nose: Nose normal.      Mouth/Throat:      Mouth: Mucous membranes are moist.      Pharynx: Oropharynx is clear.   Eyes:      Conjunctiva/sclera: Conjunctivae normal.      Pupils: Pupils are equal, round, and reactive to light.   Cardiovascular:      Rate and Rhythm: Normal rate and regular rhythm.      Pulses: Normal pulses.      Heart sounds: Normal heart sounds.   Pulmonary:      Effort: Pulmonary effort is normal.      Breath sounds: Decreased air movement present. Examination of the right-upper field reveals decreased breath sounds. Examination of the left-upper field reveals decreased breath sounds. Examination of the right-middle field reveals decreased breath sounds. Examination of the left-middle field reveals decreased breath sounds. Examination of the right-lower field reveals decreased breath sounds. Examination of the left-lower field reveals decreased breath sounds. Decreased breath sounds present.   Abdominal:      General: Bowel sounds are normal.      Palpations: Abdomen is soft.   Musculoskeletal:         General: Tenderness (lower extremities) present. Normal range of motion.      Cervical back: Normal range of motion and neck supple.   Skin:     General: Skin is warm and dry.      Capillary Refill: Capillary refill takes less than 2 seconds.   Neurological:      Mental Status:  She is alert and oriented to person, place, and time.      Motor: Weakness present.      Gait: Gait abnormal.   Psychiatric:         Mood and Affect: Mood normal.         Behavior: Behavior normal.         Thought Content: Thought content normal.         Judgment: Judgment normal.     Patient Active Problem List   Diagnosis    Psoriatic arthritis (Multi)    Overactive bladder    Myalgia    Soft tissue mass    Neurogenic claudication due to lumbar spinal stenosis    Acquired partial lipodystrophy    Acute cystitis without hematuria    Acute renal failure syndrome (CMS-HCC)    ALT (SGPT) level raised    Altered mental status    Anal sphincter incontinence    Anxiety    Arthritis    Arthritis of neck    Autoimmune thyroiditis    Blepharitis    Blurred vision, bilateral    Cataract, nuclear sclerotic, both eyes    Cellulitis    Cervical radicular pain    Pain, radicular, lumbar    Choroidal nevus, left eye    Chronic intractable pain    Chronic low back pain    Clouded consciousness    Colovaginal fistula    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    Consecutive exotropia    Change in bowel habits    Constipation    Contact dermatitis    Controlled insulin dependent type 1 diabetes mellitus (Multi)    Type 2 diabetes mellitus (Multi)    DDD (degenerative disc disease), thoracic    Dermatochalasis    Difficulty walking    Disorder of refraction    Disturbance, visual, subjective    Diverticulosis of colon    Dry eyes    Early cataract    Elevated creatine kinase    Essential (primary) hypertension    Fecal soiling due to fecal incontinence    Fistula involving female genital tract    Fracture of cervical vertebra (Multi)    Gastroesophageal reflux disease    Generalized osteoarthritis of multiple sites    Hemorrhoids without complication    Iron deficiency anemia    Leukopenia    Lumbar radiculitis    Mixed hyperlipidemia    Myofascial pain    Numbness    Obesity with body mass index 30 or greater    Palsy of conjugate gaze     Poor venous access    Incisional hernia    Postlaminectomy syndrome, lumbar region    Pseudophakia of both eyes    Psoriasis    Recurrent urinary tract infection    Retinopathy, background, nonproliferative, mild    Skin excoriation    Swelling of upper arm    Vaginal disorder    Weakness generalized     Preventative Medicine Counseling: Medication Education: Education: Current medication list reviewed and discussed, understanding, patient expressed understanding, adherence: No barriers to adherence identified.    Assessment/Plan   Diagnoses and all orders for this visit:  Essential (primary) hypertension  Comments:  Stable  Continue Lisinopril 20 mg po daily.  Continue Lasix 40 mg po daily  BMP and CBC  Orders:  -     Basic metabolic panel; Future  -     CBC; Future  Type 2 diabetes mellitus without complication, with long-term current use of insulin (Multi)  Comments:  Continue Metformin 500 mg po bid   Continue Tresiba 8 units SQ daily  Continue Blood sugar monitoring  Hgb A1c today  Orders:  -     Hemoglobin A1c; Future  Autoimmune thyroiditis  Comments:  Continue Levothyroxine 75 mcg every morning.  Check TSH  Orders:  -     Tsh With Reflex To Free T4 If Abnormal; Future  Chronic intractable pain  Comments:  Continue Dilaudid 2  mg three times daily for pian.  Numbness  Comments:  Continue Gapapentine 300 mg po bid     Labs drawn without complication.  Follow up in 2 months, or prn  Angela Dyer, APRN-CNP

## 2024-08-12 ENCOUNTER — APPOINTMENT (OUTPATIENT)
Dept: PAIN MEDICINE | Facility: CLINIC | Age: 69
End: 2024-08-12
Payer: MEDICARE

## 2024-08-13 ENCOUNTER — TELEPHONE (OUTPATIENT)
Dept: PRIMARY CARE | Facility: CLINIC | Age: 69
End: 2024-08-13
Payer: MEDICARE

## 2024-08-13 NOTE — TELEPHONE ENCOUNTER
Result Communication    Resulted Orders   Basic metabolic panel   Result Value Ref Range    Glucose 121 (H) 65 - 99 mg/dL    Sodium 138 133 - 145 mmol/L    Potassium 5.0 3.4 - 5.1 mmol/L    Chloride 99 97 - 107 mmol/L    Bicarbonate 27 24 - 31 mmol/L    Urea Nitrogen 60 (H) 8 - 25 mg/dL    Creatinine 1.60 0.40 - 1.60 mg/dL    eGFR 35 (L) >60 mL/min/1.73m*2      Comment:      Calculations of estimated GFR are performed using the 2021 CKD-EPI Study Refit equation without the race variable for the IDMS-Traceable creatinine methods.  https://jasn.asnjournals.org/content/early/2021/09/22/ASN.[email or web address]    Calcium 9.5 8.5 - 10.4 mg/dL    Anion Gap 12 <=19 mmol/L   Tsh With Reflex To Free T4 If Abnormal   Result Value Ref Range    Thyroid Stimulating Hormone 1.92 0.27 - 4.20 mIU/L    Narrative    TSH testing is performed using different testing methodology at [facility] than at other [facility]. Direct result comparisons should only be made within the same method.     Hemoglobin A1c   Result Value Ref Range    Hemoglobin A1C 5.9 (H) See below %    Estimated Average Glucose 123 Not Established mg/dL    Narrative    Diagnosis of Diabetes-Adults  Non-Diabetic: < or = 5.6%  Increased risk for developing diabetes: 5.7-6.4%  Diagnostic of diabetes: > or = 6.5%       CBC   Result Value Ref Range    WBC 4.6 4.4 - 11.3 x10*3/uL    nRBC 0.0 0.0 - 0.0 /100 WBCs    RBC 3.66 (L) 4.00 - 5.20 x10*6/uL    Hemoglobin 11.2 (L) 12.0 - 16.0 g/dL    Hematocrit 35.5 (L) 36.0 - 46.0 %    MCV 97 80 - 100 fL    MCH 30.6 26.0 - 34.0 pg    MCHC 31.5 (L) 32.0 - 36.0 g/dL    RDW 13.1 11.5 - 14.5 %    Platelets 219 150 - 450 x10*3/uL       1:55 PM      Results were successfully communicated with the patient and they acknowledged their understanding.  Pt encouraged to drink more fluids due to increased BUN.   Will recheck in 3 months  Angela Dyer, APRN-CNP

## 2024-08-20 ENCOUNTER — APPOINTMENT (OUTPATIENT)
Dept: INFUSION THERAPY | Facility: CLINIC | Age: 69
End: 2024-08-20
Payer: MEDICARE

## 2024-08-27 ENCOUNTER — INFUSION (OUTPATIENT)
Dept: INFUSION THERAPY | Facility: CLINIC | Age: 69
End: 2024-08-27
Payer: MEDICARE

## 2024-08-27 VITALS
SYSTOLIC BLOOD PRESSURE: 119 MMHG | TEMPERATURE: 97.9 F | HEART RATE: 105 BPM | RESPIRATION RATE: 18 BRPM | DIASTOLIC BLOOD PRESSURE: 84 MMHG | OXYGEN SATURATION: 95 %

## 2024-08-27 DIAGNOSIS — L40.50 PSORIATIC ARTHRITIS (MULTI): ICD-10-CM

## 2024-08-27 LAB
ALBUMIN SERPL BCP-MCNC: 4.2 G/DL (ref 3.4–5)
ALP SERPL-CCNC: 69 U/L (ref 33–136)
ALT SERPL W P-5'-P-CCNC: 18 U/L (ref 7–45)
ANION GAP SERPL CALC-SCNC: 18 MMOL/L (ref 10–20)
AST SERPL W P-5'-P-CCNC: 22 U/L (ref 9–39)
BASOPHILS # BLD AUTO: 0.06 X10*3/UL (ref 0–0.1)
BASOPHILS NFR BLD AUTO: 1 %
BILIRUB SERPL-MCNC: 0.4 MG/DL (ref 0–1.2)
BUN SERPL-MCNC: 51 MG/DL (ref 6–23)
CALCIUM SERPL-MCNC: 9.3 MG/DL (ref 8.6–10.3)
CHLORIDE SERPL-SCNC: 96 MMOL/L (ref 98–107)
CO2 SERPL-SCNC: 25 MMOL/L (ref 21–32)
CREAT SERPL-MCNC: 1.57 MG/DL (ref 0.5–1.05)
EGFRCR SERPLBLD CKD-EPI 2021: 36 ML/MIN/1.73M*2
EOSINOPHIL # BLD AUTO: 0.42 X10*3/UL (ref 0–0.7)
EOSINOPHIL NFR BLD AUTO: 6.7 %
ERYTHROCYTE [DISTWIDTH] IN BLOOD BY AUTOMATED COUNT: 13.1 % (ref 11.5–14.5)
GLUCOSE SERPL-MCNC: 132 MG/DL (ref 74–99)
HCT VFR BLD AUTO: 37.3 % (ref 36–46)
HGB BLD-MCNC: 12 G/DL (ref 12–16)
IMM GRANULOCYTES # BLD AUTO: 0.02 X10*3/UL (ref 0–0.7)
IMM GRANULOCYTES NFR BLD AUTO: 0.3 % (ref 0–0.9)
LYMPHOCYTES # BLD AUTO: 1.69 X10*3/UL (ref 1.2–4.8)
LYMPHOCYTES NFR BLD AUTO: 26.8 %
MCH RBC QN AUTO: 30.3 PG (ref 26–34)
MCHC RBC AUTO-ENTMCNC: 32.2 G/DL (ref 32–36)
MCV RBC AUTO: 94 FL (ref 80–100)
MONOCYTES # BLD AUTO: 0.75 X10*3/UL (ref 0.1–1)
MONOCYTES NFR BLD AUTO: 11.9 %
NEUTROPHILS # BLD AUTO: 3.37 X10*3/UL (ref 1.2–7.7)
NEUTROPHILS NFR BLD AUTO: 53.3 %
NRBC BLD-RTO: 0 /100 WBCS (ref 0–0)
PLATELET # BLD AUTO: 208 X10*3/UL (ref 150–450)
POTASSIUM SERPL-SCNC: 4.6 MMOL/L (ref 3.5–5.3)
PROT SERPL-MCNC: 6.6 G/DL (ref 6.4–8.2)
RBC # BLD AUTO: 3.96 X10*6/UL (ref 4–5.2)
SODIUM SERPL-SCNC: 134 MMOL/L (ref 136–145)
WBC # BLD AUTO: 6.3 X10*3/UL (ref 4.4–11.3)

## 2024-08-27 PROCEDURE — 2500000004 HC RX 250 GENERAL PHARMACY W/ HCPCS (ALT 636 FOR OP/ED): Performed by: INTERNAL MEDICINE

## 2024-08-27 PROCEDURE — 96374 THER/PROPH/DIAG INJ IV PUSH: CPT | Mod: INF | Performed by: INTERNAL MEDICINE

## 2024-08-27 PROCEDURE — 85025 COMPLETE CBC W/AUTO DIFF WBC: CPT | Performed by: INTERNAL MEDICINE

## 2024-08-27 PROCEDURE — 96365 THER/PROPH/DIAG IV INF INIT: CPT | Mod: INF | Performed by: INTERNAL MEDICINE

## 2024-08-27 PROCEDURE — 36415 COLL VENOUS BLD VENIPUNCTURE: CPT

## 2024-08-27 PROCEDURE — 80053 COMPREHEN METABOLIC PANEL: CPT | Performed by: INTERNAL MEDICINE

## 2024-08-27 RX ORDER — FAMOTIDINE 10 MG/ML
20 INJECTION INTRAVENOUS ONCE AS NEEDED
OUTPATIENT
Start: 2024-10-15

## 2024-08-27 RX ORDER — HEPARIN 100 UNIT/ML
500 SYRINGE INTRAVENOUS AS NEEDED
Status: DISCONTINUED | OUTPATIENT
Start: 2024-08-27 | End: 2024-08-27 | Stop reason: HOSPADM

## 2024-08-27 RX ORDER — HEPARIN SODIUM,PORCINE/PF 10 UNIT/ML
50 SYRINGE (ML) INTRAVENOUS AS NEEDED
OUTPATIENT
Start: 2024-08-27

## 2024-08-27 RX ORDER — HEPARIN 100 UNIT/ML
500 SYRINGE INTRAVENOUS AS NEEDED
OUTPATIENT
Start: 2024-08-27

## 2024-08-27 RX ORDER — ALBUTEROL SULFATE 0.83 MG/ML
3 SOLUTION RESPIRATORY (INHALATION) AS NEEDED
OUTPATIENT
Start: 2024-10-15

## 2024-08-27 RX ORDER — DIPHENHYDRAMINE HYDROCHLORIDE 50 MG/ML
50 INJECTION INTRAMUSCULAR; INTRAVENOUS AS NEEDED
OUTPATIENT
Start: 2024-10-15

## 2024-08-27 RX ORDER — EPINEPHRINE 0.3 MG/.3ML
0.3 INJECTION SUBCUTANEOUS EVERY 5 MIN PRN
OUTPATIENT
Start: 2024-10-15

## 2024-08-27 NOTE — PATIENT INSTRUCTIONS
Today :We administered methylPREDNISolone sod succinate and golimumab (Simponi Aria) 137.5 mg in sodium chloride 0.9% 100 mL IV.     For:   1. Psoriatic arthritis (Multi)         Your next appointment is due in:  10/22/24        Please read the  Medication Guide that was given to you and reviewed during todays visit.     (Tell all doctors including dentists that you are taking this medication)     Go to the emergency room or call 911 if:  -You have signs of allergic reaction:   -Rash, hives, itching.   -Swollen, blistered, peeling skin.   -Swelling of face, lips, mouth, tongue or throat.   -Tightness of chest, trouble breathing, swallowing or talking     Call your doctor:  - If IV / injection site gets red, warm, swollen, itchy or leaks fluid or pus.     (Leave dressing on your IV site for at least 2 hours and keep area clean and dry  - If you get sick or have symptoms of infection or are not feeling well for any reason.    (Wash your hands often, stay away from people who are sick)  - If you have side effects from your medication that do not go away or are bothersome.     (Refer to the teaching your nurse gave you for side effects to call your doctor about)    - Common side effects may include:  stuffy nose, headache, feeling tired, muscle aches, upset stomach  - Before receiving any vaccines     - Call the [facility] at   If:  - You get sick, are on antibiotics, have had a recent vaccine, have surgery or dental work and your doctor wants your visit rescheduled.  - You need to cancel and reschedule your visit for any reason. Call at least 2 days before your visit if you need to cancel.   - Your insurance changes before your next visit.    (We will need to get approval from your new insurance. This can take up to two weeks.)     The [facility] is opened Monday thru Friday. We are closed on weekends and holidays.   Voice mail will take your call if the center is closed. If you leave  a message please allow 24 hours for a call back during weekdays. If you leave a message on a weekend/holiday, we will call you back the next business day.

## 2024-08-27 NOTE — PROGRESS NOTES
[facility]   Infusion Clinic Note   Date: 2024   Name: Nesha Mcleod  : 1955   MRN: [id number]          Reason for Visit: No chief complaint on file.         Today: We administered methylPREDNISolone sod succinate and golimumab (Simponi Aria) 137.5 mg in sodium chloride 0.9% 100 mL IV.       Visit Type: INFUSION       Ordered By: Lumapas       Accompanied by:Self       Diagnosis: Psoriatic arthritis (Multi)        Allergies:   Allergies as of 2024 - Reviewed 2024   Allergen Reaction Noted    Methotrexate Swelling 10/11/2023    Trazodone Other 10/11/2023    Adhesive tape-silicones Hives 10/11/2023    Bleach (sodium hypochlorite) Hives, Unknown, and Itching 2016    Chlorine Unknown and Other 10/11/2023    Ciprofloxacin Other and Unknown 2011    Citalopram Other 10/11/2023    Codeine Hives and Rash 2011    Doxycycline Nausea And Vomiting and Unknown 10/11/2023    Latex Hives and Unknown 2011    Morphine Hives and Rash 2011    Penicillins Hives 10/11/2023    Soap Unknown and Hives 10/11/2023    Tetracycline Hives 10/11/2023    Amitriptyline Other 2011    Grass pollen Itching 2016    Infliximab Unknown 10/11/2023    Other Hives and Unknown 10/11/2023    Rubber, unspecified Unknown 10/11/2023    Trace metals Unknown 10/11/2023    Weed pollen Itching 2016    Methocarbamol Rash and Unknown 10/11/2023    Nickel Rash 10/11/2023    Sulfa (sulfonamide antibiotics) Nausea/vomiting and GI Upset 2011          Current Medications has a current medication list which includes the following prescription(s): acetaminophen, aspirin, atorvastatin, bupropion sr, cinnamon bark extract, cyclobenzaprine, docusate sodium, furosemide, gabapentin, golimumab, hydromorphone, hydromorphone, hydromorphone, leflunomide, levothyroxine, lisinopril, magnesium oxide, metformin, metformin, multivit-minerals/folic acid, naloxone, onetouch ultra test,  "potassium gluconate, prednisone, and tresiba flextouch u-100.       Vitals:   There were no vitals filed for this visit.          Infusion Pre-procedure Checklist:   - Allergies reviewed: yes   - Medications reviewed: yes       - Previous reaction to current treatment: no      Assess patient for the concerns below. Document provider notification as appropriate.  - Active or recent infection with/without current antibiotic use: no  - Recent or planned invasive dental work: no  - Recent or planned surgeries: no  - Recently received or plans to receive vaccinations: no  - Has treatment related toxicities: no  - Is pregnant:  no      Pain: 5   - Is the pain different from normal: no   - Is the pain tolerable: yes   - Is your Doctor aware:  yes       Labs: Labs drawn and sent per order          Fall Risk Screening:         Review Of Systems:  Review of Systems - Oncology      ROS completed? Yes      Infusion Readiness:  - Assessment Concerns Related to Infusion: No  - Provider notified: no      Document Below Only If Indicated:   New Patient Education:    N/A (returning patient for continuation of therapy. Ongoing education provided as needed.)        Treatment Conditions & Drug Specific Questions:    Golimumab  (SIMPONI)    (Unless otherwise specified on patient specific therapy plan):     TREATMENT CONDITIONS:  Unless otherwise specified on patient specific thearpy plan HOLD and notify provider prior to proceeding with today's infusion if patient with:  o Positive T-Spot  o Positive Hepatitis B Surface Ag / Hepatitis C     Lab Results   Component Value Date    TBSIN Negative 07/20/2022    QFG NEGATIVE 07/30/2018      No results found for: \"HAGCN\", \"HEPBSURABI\", \"HBSAG\", \"XHAGF\", \"HEPBSAG\", \"EXTHEPBSAG\", \"NONUHSWGH\"   No results found for: \"NONUHFIRE\", \"NONUHSWGH\", \"NONUHFISH\", \"EXTHEPBSAG\"  No results found for: \"HEPATOT\", \"HEPAIGM\", \"HEPBCIGM\", \"HEPBCAB\", \"HBEAG\", \"HEPCAB\"   No results found for: \"HEPCAB\"  No results " "found for: \"HBCTI\", \"HEPBCAB\"    Lab Results   Component Value Date    WBC 6.3 08/27/2024    HGB 12.0 08/27/2024    HCT 37.3 08/27/2024    MCV 94 08/27/2024     08/27/2024        Labs reviewed and patient meets treatment conditions? Yes    DRUG SPECIFIC QUESTIONS:   - Up to date on all immunizations per patient report? Yes    Available Immunization Records:  Immunization History   Administered Date(s) Administered    DTaP, Unspecified 12/05/2019    Flu vaccine (IIV4), preservative free *Check age/dose* 09/28/2013, 09/14/2016, 10/06/2020    Flu vaccine, trivalent, preservative free, HIGH-DOSE, age 65y+ (Fluzone) 09/04/2019, 09/09/2023    Influenza, seasonal, injectable 10/09/2014, 10/26/2015    Novel influenza-H1N1-09, preservative-free 12/23/2009    Pfizer Gray Cap SARS-CoV-2 04/25/2022    Pfizer Purple Cap SARS-CoV-2 01/17/2021, 03/27/2021, 08/20/2021    Pneumococcal conjugate vaccine, 20-valent (PREVNAR 20) 05/27/2022    Pneumococcal polysaccharide vaccine, 23-valent, age 2 years and older (PNEUMOVAX 23) 10/13/2014, 01/13/2017    Tdap vaccine, age 7 year and older (BOOSTRIX, ADACEL) 05/27/2022    Zoster, live 11/03/2012         - Any history of or new or worsening s/s of heart failure which may include dyspnea, edema? Yes  (If worsening s/s notify provider prior to proceeding. Simponi may cause exacerbation of heart failure)     - Any new diagnosis of cancer, especially lymphomas? No    (Box Warning: Malignancy. If YES notify prescribing provider prior to proceeding )      REMINDER:  WEIGHT BASED DRUG    Recommended Vitals/Observation:  Vitals: Take vital signs prior to starting infusion, at infusion conclusion and as needed.   Observation: No observation.        Weight Based Drug Calculations:    WEIGHT BASED DRUGS: NOT APPLICABLE / FLAT DOSE          Initiated By: Gunjan Arias RN        "

## 2024-09-09 ENCOUNTER — OFFICE VISIT (OUTPATIENT)
Dept: PAIN MEDICINE | Facility: CLINIC | Age: 69
End: 2024-09-09
Payer: MEDICARE

## 2024-09-09 VITALS
HEART RATE: 101 BPM | RESPIRATION RATE: 17 BRPM | SYSTOLIC BLOOD PRESSURE: 96 MMHG | OXYGEN SATURATION: 97 % | DIASTOLIC BLOOD PRESSURE: 66 MMHG

## 2024-09-09 DIAGNOSIS — M79.18 CERVICAL MYOFASCIAL PAIN SYNDROME: ICD-10-CM

## 2024-09-09 DIAGNOSIS — M96.1 POSTLAMINECTOMY SYNDROME, LUMBAR REGION: Primary | ICD-10-CM

## 2024-09-09 DIAGNOSIS — M48.061 SPINAL STENOSIS, LUMBAR REGION, WITHOUT NEUROGENIC CLAUDICATION: ICD-10-CM

## 2024-09-09 DIAGNOSIS — Z79.891 ENCOUNTER FOR LONG-TERM USE OF OPIATE ANALGESIC: ICD-10-CM

## 2024-09-09 DIAGNOSIS — L40.50 PSORIATIC ARTHRITIS (MULTI): ICD-10-CM

## 2024-09-09 PROCEDURE — 99214 OFFICE O/P EST MOD 30 MIN: CPT | Performed by: NURSE PRACTITIONER

## 2024-09-09 PROCEDURE — 4010F ACE/ARB THERAPY RXD/TAKEN: CPT | Performed by: NURSE PRACTITIONER

## 2024-09-09 PROCEDURE — 1160F RVW MEDS BY RX/DR IN RCRD: CPT | Performed by: NURSE PRACTITIONER

## 2024-09-09 PROCEDURE — 1036F TOBACCO NON-USER: CPT | Performed by: NURSE PRACTITIONER

## 2024-09-09 PROCEDURE — 3078F DIAST BP <80 MM HG: CPT | Performed by: NURSE PRACTITIONER

## 2024-09-09 PROCEDURE — 1159F MED LIST DOCD IN RCRD: CPT | Performed by: NURSE PRACTITIONER

## 2024-09-09 PROCEDURE — 3044F HG A1C LEVEL LT 7.0%: CPT | Performed by: NURSE PRACTITIONER

## 2024-09-09 PROCEDURE — 3074F SYST BP LT 130 MM HG: CPT | Performed by: NURSE PRACTITIONER

## 2024-09-09 PROCEDURE — 1125F AMNT PAIN NOTED PAIN PRSNT: CPT | Performed by: NURSE PRACTITIONER

## 2024-09-09 PROCEDURE — 3048F LDL-C <100 MG/DL: CPT | Performed by: NURSE PRACTITIONER

## 2024-09-09 RX ORDER — NALOXONE HYDROCHLORIDE 4 MG/.1ML
1 SPRAY NASAL AS NEEDED
Qty: 2 EACH | Refills: 0 | Status: SHIPPED | OUTPATIENT
Start: 2024-09-09

## 2024-09-09 RX ORDER — HYDROMORPHONE HYDROCHLORIDE 2 MG/1
2 TABLET ORAL 3 TIMES DAILY PRN
Qty: 84 TABLET | Refills: 0 | Status: SHIPPED | OUTPATIENT
Start: 2024-09-09 | End: 2024-10-07

## 2024-09-09 RX ORDER — GABAPENTIN 300 MG/1
300 CAPSULE ORAL 2 TIMES DAILY
Qty: 60 CAPSULE | Refills: 2 | Status: SHIPPED | OUTPATIENT
Start: 2024-09-09 | End: 2024-12-08

## 2024-09-09 RX ORDER — HYDROMORPHONE HYDROCHLORIDE 2 MG/1
2 TABLET ORAL 3 TIMES DAILY PRN
Qty: 84 TABLET | Refills: 0 | Status: SHIPPED | OUTPATIENT
Start: 2024-11-04 | End: 2024-12-02

## 2024-09-09 RX ORDER — HYDROMORPHONE HYDROCHLORIDE 2 MG/1
2 TABLET ORAL 3 TIMES DAILY PRN
Qty: 84 TABLET | Refills: 0 | Status: SHIPPED | OUTPATIENT
Start: 2024-10-07 | End: 2024-11-04

## 2024-09-09 ASSESSMENT — ENCOUNTER SYMPTOMS
DEPRESSION: 1
CARDIOVASCULAR NEGATIVE: 1
NECK PAIN: 1
OCCASIONAL FEELINGS OF UNSTEADINESS: 0
SEIZURES: 0
RESPIRATORY NEGATIVE: 1
BACK PAIN: 1
LOSS OF SENSATION IN FEET: 0
NECK STIFFNESS: 0
GASTROINTESTINAL NEGATIVE: 1
ALLERGIC/IMMUNOLOGIC NEGATIVE: 1
MYALGIAS: 1
LIGHT-HEADEDNESS: 0
SPEECH DIFFICULTY: 0
FACIAL ASYMMETRY: 0
CONSTITUTIONAL NEGATIVE: 1
HEMATOLOGIC/LYMPHATIC NEGATIVE: 1
DIZZINESS: 0
HEADACHES: 0
PSYCHIATRIC NEGATIVE: 1
EYES NEGATIVE: 1
TREMORS: 0
JOINT SWELLING: 1
WEAKNESS: 1
NUMBNESS: 1
ENDOCRINE NEGATIVE: 1
ARTHRALGIAS: 1

## 2024-09-09 ASSESSMENT — PAIN SCALES - GENERAL: PAINLEVEL: 9

## 2024-09-09 NOTE — PROGRESS NOTES
Subjective   Patient ID: Nesha Mcleod is a 69 y.o. female presents for chronic pain management.       Nesha follows up for interval reevaluation for chronic low back pain from lumbar post laminectomy syndrome and mid back pain from thoracic disc herniations.  Right hip pain from arthritis.  Does follow rheumatology and receives DMARD infusions.    Hydromorphone 2 MG up to 3 times daily as needed, gabapentin 300 MG to a total of twice daily due to renal impairment, once a day and Flexeril 10 MG twice a day as she needs reduces pain and improves function up to 80%. Average pain score is 5 out of 10 with medication.  Today it is a 9 out of 10 due to right hip pain.  Better able to manage ADLs with improved function since injection therapy in combination with medicinal therapy. Has an average quality family and social life with current condition and treatment.      Toxicology consistent May 15, 2024.  Annual controlled substance agreement and opioid risk tool are completed and scanned into the chart May 15, 2024.     Serena is with laminectomy changes at T11, L2, L3 and L5 levels is with a grade 1 anterolisthesis of L5 on S1. At the L2-L3 level laminectomy changes are also noted with scoliosis and disc degeneration. Is also with moderate facet joint arthropathy. The combination of these changes causes moderate to severe central canal stenosis, severe left lateral recess and moderate right lateral recess stenosis with severe left neural foramina and moderate to severe right neuroforaminal narrowing. MRI was from April 20, 2021 ordered by primary care doctor from [facility].    Fell 3 times since last office visit.  This is mainly due to left leg giving out.  Did not hit head.  Offered physical therapy for her right hip pain from arthritis in the form of aqua therapy or land therapy.  She declines due to transportation and cost.  Continues to utilize rollator walker for ambulation.    Scheduled to see orthopedic surgeon  for a right greater trochanteric bursa injection to help cover right hip pain.  Has responded very well to these in the past.  You can    For continuity:   Given the patient's report of reduced pain and improved functional ability without adverse effects, it is reasonable to treat with narcotic medications. The terms of the opioid agreement as well as the potential risks and adverse effects of the patient's medication regimen were discussed in detail. This includes if applicable due to dosage of medication permission to discuss and coordinate care with other treatment providers relevant to the patients condition. The patient verbalized understanding.     Risks and side effects of chronic opioid therapy including but not limited to tolerance, dependence, constipation, hyperalgesia, cognitive side effects, addiction and possible death due to overuse and or misuse were discussed. I also discussed that such medications when co-administered with other sedative agents including but not limited to alcohol, benzodiazepines, sedative hypnotics and illegal drugs could pose life threatening consequences including death. I also explained the impact that the administration of such medication has on a patient with obstructive sleep apnea and continued recommendations for use of apnea devices if ordered are prescribed by other physicians. In order to effectively and safely treat the pain, I also emphasized the importance of compliance with the treatment plan, as well as compliance with the terms of the opioid agreement, which was reviewed in detail. I explained the importance of being responsible with the medications and to take these only as prescribed, never in excess and never for reasons other than pain reduction. The patient was counseled on keeping the medications safe and locked away from children and other adults as well as disposal methods and options. The patient understood the risks and instructions.     I also  discussed with the patient in detail that based on the clinical response to the opioid medications and improvements of activities of daily living, sleep, and work performance in light of compliance with the treatment plan we can continue this form of therapy for the above chronic pain. The goal and rationale used for current treatment with chronic opioid medication is to control the pain and alleviate disability induced by the chronic pain condition noted above after failures of other non-opioid and nonpharmacological modalities to treat the chronic pain and the symptoms associated with have failed. The patient understood the goals in terms of the above treatment plan and had no further questions prior to leaving the office today.     Of note, the above-mentioned diagnoses/conditions and expected fluctuating nature of pain, and pain characteristic changes may lead to prolonged functional impairment requiring frequent and multiple reassessments with continued high level medical decision making. As noted, medication and medication management may require opiate therapy in excess of a routine less than 30 day medication requirement. The patient may require daily opiate therapy necessitating month-long prescription medication as noted above in order to perform activities of daily living and achieve acceptable quality of life with respect to their chronic pain condition for the foreseeable future. We monitor our patient's carefully through drug monitoring, medication counts, urine drug testing specific to their medication as well as a myriad of other substances and with frequent follow-ups with interval reassement of the chronic pain condition, its pathophysiology and prognosis.     The level of clinical decision making at this office visit is high due to high risks and complications including mortality and morbidity related to acute and chronic pain with respects to life, bodily function, and treatment. Risks and  clinical decisions with respect to under treatment, failure to maintain adequate treatment, and/or overtreatment complications and outcomes were discussed with the patient with respect to their chronic pain conditions, interventional therapies, as well as the use of various medications including possible controlled/dangerous medications. The amount and complexity of reviewed data at this in subsequent office visits is high given patient's fluctuating clinical presentation, laboratory and radiographic reports, prescription monitoring program data, and medication history as well as other relevant data as noted above. Pertinent negatives and positives data was used in consideration for the above-mentioned high complexity.      Given the patient's total MED, general use of daily opiates, or other coadministered medications in various classes the patient was offered a prescription for Narcan. I instructed the patient that it is important that patient fill this medication in order to demonstrate understanding of the gravity of possible side effects including respiratory depression and risk of overdose of this opiate load or medication combination. As such patient will be required to bring Narcan prescription to follow-up appointments as part of compliance with continued opiate care.     With respect to opiate induced constipation I discussed multiple ways to combat this problem including staying hydrated and taking over-the-counter medications such as Dulcolax, Miralax and Senna. If these treatments are not effective we could consider such medications as Amitiza, Linzess and Movantik.     Disclaimer: This note was transcribed using an audio transcription device. As such, minor errors may be present with regard to spelling, punctuation, and inadvertent word insertion. Please disregard such errors.    Narrative & Impression   Interpreted By:  Schoenberger, Joseph,   STUDY:  XR HIP RIGHT WITH PELVIS WHEN PERFORMED 2 OR 3  VIEWS; ;  3/12/2024  8:47 am      INDICATION:  Signs/Symptoms:pain.      COMPARISON:  None.      ACCESSION NUMBER(S):  [id number]      ORDERING CLINICIAN:  DORIS SANTOS      FINDINGS:  There are calcifications of the superolateral acetabular labrum. Mild  degenerative changes in the hip with some narrowing and mild  spurring. No fracture or dislocation.      IMPRESSION:  No definite acute findings. See discussion above.          MACRO:  None      Signed by: Joseph Schoenberger 3/13/2024 9:09 AM  Dictation workstation:   [id number]     Narrative & Impression   Interpreted By:  Kathie Montaño,   STUDY:  XR SHOULDER LEFT 2+ VIEWS;  5/6/2024 8:50 am      INDICATION:  Signs/Symptoms:pain and swelling.      COMPARISON:  07/14/2021      ACCESSION NUMBER(S):  [id number]      ORDERING CLINICIAN:  DORIS SANTOS      FINDINGS:  Three views of the left shoulder obtained.      Artifact from overlying clothing noted. Reverse shoulder arthroplasty  in stable alignment. Likely unchanged mild separation between the  articular surface of the proximal humeral component and proximal  humerus and mild cortical irregularity adjacent to the stem of the  humeral prosthesis allowing for differences in projection. Smooth  irregular ununited fracture of the scapula and smooth  calcifications/ossifications adjacent to the acromion similar to the  prior exam.      IMPRESSION:  Status post reverse shoulder arthroplasty with intact hardware and  mild productive changes similar to 07/14/2021. Remote ununited  scapular fracture again noted.      MACRO:  None.      Signed by: Kathie Montaño 5/9/2024 8:33 AM  Dictation workstation:   [id number]   Narrative & Impression  Interpreted By:  Kathie Montaño,   STUDY:  XR ELBOW LEFT 3+ VIEWS;  5/6/2024 8:50 am      INDICATION:  Signs/Symptoms:pain and swelling.      COMPARISON:  06/28/2008      ACCESSION NUMBER(S):  [id number]      ORDERING CLINICIAN:  DORIS SANTOS      FINDINGS:  Five views of the left  elbow obtained.      No acute fracture or osseous displacement. Radial humeral joint space  narrowing. Radial humeral and radioulnar joint spurring. Faint  periarticular calcifications. Mild spurring/productive change along  the lateral distal humeral epicondyle. Small joint effusion. No  erosive changes.      IMPRESSION:  Moderate productive/osteoarthritic changes of the left elbow with  small joint effusion.      MACRO:  None.      Signed by: Kathie Montaño 5/9/2024 8:29 AM  Dictation workstation:   [id number]    Signed Time Phone Pager   Love Matos MD 9/05/2023 11:03 [phone]          Narrative & Impression   PROCEDURE:         HIP RT 2 VIEW W OR WO AP PELVIS - IXR  0057  REASON FOR EXAM: hip pain     RESULT: MRN: [id number]  Patient Name: LIZZIE PHILLIPS     STUDY:  HIP RT 2 VIEW W OR WO AP PELVIS 9/5/2023 10:55 am     INDICATION:  Right hip pain with history of fall 1 week ago     COMPARISON:  None available.     ACCESSION NUMBER(S):  [id number]     ORDERING CLINICIAN:  KATHY LOBO     TECHNIQUE:  AP view of the pelvis with AP and lateral views of the right hip.     FINDINGS:  No fracture or dislocation of the right hip is demonstrated. There is no  acute bony abnormality of the pelvis. There is chondrocalcinosis of the hips  bilaterally. Arterial calcification is also present. There are pelvic  phleboliths identified.     IMPRESSION:  No fracture or dislocation of the right hip with no pelvic fracture  identified.     Chondrocalcinosis of the hips bilaterally.  Dictation workstation:   [id number]     Original Interpreting Physician:   LOVE MATOS M.D.  Original Transcribed by/Date: MMODAL Sep  5 2023  9:48A  Original Electronically Signed by/Date: LOVE MATOS M.D. Sep  5 2023 11:03A     Addendum Interpreting Physician:  Addendum Transcribed by/Date: NO ADDENDUM  Addendum Electronically Signed by/Date:     Narrative & Impression   PROCEDURE:         HIP RT WO CONTRAST - ICT  3170  REASON FOR EXAM: Hip trauma,  fracture suspected, xray done     RESULT: MRN: [id number]  Patient Name: LIZZIE PHILLIPS     STUDY:  HIP RT WO CONTRAST 9/5/2023 11:50 am     INDICATION:  Pain and fall     COMPARISON:  None available.     ACCESSION NUMBER(S):  [id number]     ORDERING CLINICIAN:  PATTI WASHINGTON     TECHNIQUE:  Unenhanced axial images through the right hip are performed with sagittal  and coronal reformations completed by the technologist at the acquisition  scanner.     FINDINGS:  There is no fracture or dislocation of the right hip. No fracture of the  right hemipelvis is observed. There is chondrocalcinosis of the right hip as  well as osteoarthritis. Small osteophytes project from the right femoral  head.     No intramuscular hematoma is seen.     There is vacuum disc at the L4-5 and L5-S1 levels with a grade 1  spondylolisthesis of L5 on S1 with unilateral spondylolysis on the left at  L5 identified. There is also considerable facet arthrosis bilaterally at  L4-5 and L5-S1 levels.     The urinary bladder is moderately distended.        IMPRESSION:  No fracture or dislocation of the right hip with no fracture of right  hemipelvis identified.     Grade 1 spondylolisthesis of L5 on S1 with pars defect on the left at L5.     Moderate distention of urinary bladder.     Mild osteoarthritis of the right hip with chondrocalcinosis.  Dictation workstation:   [id number]  This exam is available in DICOM format to non-affiliated healthcare  facilities on a secure media free searchable basis with prior patient  authorization.  The patient exposure is reported to a radiation dose index  registry.  All CT examinations are performed with one or more of the  following dose reduction techniques: Automated Exposure Control, Adjustment  of mA and/or KV according to patient size, or use of iterative  reconstruction techniques.     Original Interpreting Physician:   LATOSHA FRANCOIS M.D.  Original Transcribed by/Date: MMODAL Sep  5 2023 11:13A  Original  Electronically Signed by/Date: LATOSHA FRANCOIS M.D. Sep  5 2023 12:04P     Narrative & Impression   PROCEDURE:         SPINE LUMBAR WO CONTRAST - CMR  2010  REASON FOR EXAM: SPINAL STENOSIS OF LUMBAR REGION WITH NEUROGENIC  CLAUDICATION     RESULT: MRN: [id number]  Patient Name: LIZZIE PHILLIPS     STUDY:  SPINE LUMBAR WO CONTRAST; 5/23/2023 1:16 pm     INDICATION:  SPINAL STENOSIS OF LUMBAR REGION WITH NEUROGENIC CLAUDICATION. 68-year-old  woman with chronic lower back pain and bilateral leg pain     COMPARISON:  Lumbar spine MRI 04/20/2021.     ACCESSION NUMBER(S):  [id number]     ORDERING CLINICIAN:  APRIL FLORES     TECHNIQUE:  Multiecho and multiplanar imaging of the lumbar spine was performed without  IV contrast.     FINDINGS:  Redemonstrated levoscoliosis of the lumbar spine. Interbody fusion of the L3  and L4 vertebral bodies again noted with laminectomy changes at L2, L3, and  L5 again seen.     Moderate to severe disc space narrowing is again noted at L1-L2, and L2-L3.        Alignment: There is grade 1 anterolisthesis of L5 on S1. There is grade 1  retrolisthesis of L2 on L3.        Bone marrow signal: Redemonstrated heterogeneous bone marrow signal, which  could be related to marrow reconversion process. Redemonstrated slightly  increased STIR signal within the L2 vertebral body, which may reflect  reactive bone marrow edema in the setting of degenerative change.        Conus: The conus medullaris terminates at the level of L1.        Paraspinal Soft tissues: Postsurgical changes in the posterior lumbar soft  tissues again noted.        Imaged Abdomen: Millimetric T2 hyperintense, T1 hypointense right and left  renal cysts are seen.        T12-L1: Moderate left paracentral disc bulge again noted causing effacement  of the left side of the ventral thecal sac. No significant spinal canal or  neural foraminal narrowing.        L1-2: Mild disc bulge with facet arthropathy is again noted, resulting in  mild spinal canal  narrowing. There is also mild bilateral neural foraminal  narrowing, more pronounced on the right.        L2-3: Laminectomy changes again noted. There is broad-based disc osteophyte  with facet hypertrophy resulting in severe spinal canal stenosis. Severe  bilateral neural foraminal narrowing is identified with likely contact and  impingement upon the exiting bilateral nerve roots.        L3-4: Redemonstrated interbody fusion/ankylosis of the L3-L4 vertebral  bodies with laminectomy changes noted. Mild facet joint arthropathy. No  significant spinal canal narrowing. Mild right neural foraminal narrowing.        L4-5: Spondylolisthesis with broad-based disc protrusion, facet hypertrophy,  and mild ligamentum flavum hypertrophy results in mild spinal canal  narrowing. There is also moderate right and mild-to-moderate left neural  foraminal narrowing.        L5-S1: Laminectomy changes again noted. No sign of spinal canal narrowing.  There is moderate facet joint hypertrophy. There is mild-to-moderate left  and mild right neural foraminal narrowing.     IMPRESSION:  1. Redemonstrated postsurgical and degenerative changes of the lumbar spine,  with multilevel neural foraminal narrowing and spinal canal narrowing.     2. At L2-L3, there is severe bilateral neural foraminal narrowing and severe  spinal canal stenosis.     3. Please see above for full description of findings at each individual disc  level.     4. Redemonstrated heterogeneous bone marrow signal, as can be seen with  marrow reconversion process. Please correlate with appropriate clinical and  laboratory data.        Dictation workstation:   [id number]     Original Interpreting Physician:   SHAQ SOLO MD  Original Transcribed by/Date: MMODAL May 23 2023 12:15P  Original Electronically Signed by/Date: SHAQ SOLO MD May 23 2023  2:52P       Results/Data  Xray Cervical Spine 2 or 3 View [id number] 08:15AM Shayy Foreman      Test Name Result Flag  Reference   Xray Cervical Spine 2 or 3 View (Report)       FINAL REPORT  Interpreted by: DICKSON MCNEIL   08/03/22 11:15  MRN: [id number]  Patient Name: LIZZIE PHILLIPS     STUDY:  SPINE, CERVICAL, 2 OR 3 VIEWS; ; 8/2/2022 8:15 am     INDICATION:  CERVICAL PAIN M54.2: Cervical pain.     COMPARISON:  06/27/2022     ACCESSION NUMBER(S):  [id number]     ORDERING CLINICIAN:  LEONILA WHARTON     FINDINGS:  Stable right central venous catheter. Postoperative changes C5-C6  ACDF. Atlantoaxial interval is maintained. Irregularity along the C2  odontoid base compatible better evaluated on the CT from 06/27/2022.  No fracture or traumatic malalignment. Mild multilevel degenerative  changes of the cervical spine. Precervical soft tissue planes are  maintained. Imaged lung fields are clear.     IMPRESSION:  No acute fracture or traumatic malalignment.  Stable CT odontoid irregularity likely secondary to history of  rheumatoid arthritis.  Stable postoperative changes C5-C6 ACDF.  Mild multilevel degenerative changes of the cervical spine.        Electronically signed by: EWA  08/03/22 11:15      Xray Cervical Spine 2 or 3 View 27Jun2022 11:35PM Gaye Skelton      Test Name Result Flag Reference   Xray Cervical Spine 2 or 3 View (Report)       FINAL REPORT  Interpreted by: LOBITO PIKE   06/28/22 00:04  MRN: [id number]  Patient Name: LIZZIE PHILLIPS     STUDY:  Cervical spine x-rays     INDICATION:  fx     COMPARISON:  X-ray cervical spine 08/24/2021  MRI cervical spine 05/28/2020     ACCESSION NUMBER(S):  [id number]     ORDERING CLINICIAN:  GAYE SKELTON     TECHNIQUE:  Four views of the cervical spine     FINDINGS:  Limited exam given nonvisualization of the lower cervical spine on  lateral view.     Alignment: Minimal anterior subluxation of C3-4, degenerative.     Bones: Minimal multilevel loss of vertebral body height. ACDF of  C5-C6.     Disc spaces: Moderate spondylosis.     Soft tissues: Right-sided  chest port. Shoulder arthroplasty.     IMPRESSION:  1. Limited exam.  2. No acute finding.     Electronically signed by: SHAHZAD  06/28/22 00:04      Xray Knee 1 or 2 View [id number] 03:30PM Toussaint, Karl      Test Name Result Flag Reference   Xray Knee 1 or 2 View (Report)       FINAL REPORT  Interpreted by: BRITTANY FAYE HANAUER, MD   02/14/22 15:52  MRN: [id number]  Patient Name: LIZZIE PHILLIPS     STUDY:  KNEE; 1 OR 2 VIEWS; 2/14/2022 3:30 pm     INDICATION:  Status post total knee replacement in pacu .     COMPARISON:  None     ACCESSION NUMBER(S):  [id number]     ORDERING CLINICIAN:  KARL TOUSSAINT     TECHNIQUE:  AP and lateral portable postoperative views of the right knee were  obtained.     FINDINGS:  Immediately status post right knee arthroplasty. Femoral and tibial  components appear well seated and in good alignment. Patellar  component is radiolucent. There is postsurgical soft tissue air and  also edema anteriorly in the soft tissues and in the suprapatellar  bursa. No lytic or blastic destructive bone lesion. No acute  fracture or dislocation.     IMPRESSION:  Immediately status post right knee arthroplasty. Postsurgical changes  as described. No acute fracture.     Electronically signed by: BRITTANY FAYE  02/14/22 15:52      MRI L Spine without Contrast 20Apr2021 09:05AM Non Ambulatory, Provider   Ordering Provider: MARIAMA LIN [id number]      Test Name Result Flag Reference   MRI L Spine without Contrast (Report)       Interpreted by: MARCIA PADILLA  04/20/21 10:12  MRN: [id number]  Patient Name: LIZZIE PHILLIPS     STUDY:  MRI L-SPINE WO; ; 4/20/2021 9:05 am     INDICATION:  M48.062 Spinal stenosis, lumbar region with neurogenic claudication.  M48.062 Spinal stenosis, lumbar region with neurogenic claudication.     COMPARISON:  12/16/2020, 04/16/2020     ACCESSION NUMBER(S):  [id number]     ORDERING CLINICIAN:  MARIAMA LIN     TECHNIQUE:  Multiplanar, multisequence imaging of the lumbar spine was  performed  without intravenous contrast administration.     FINDINGS:  Moderate levoscoliosis noted. Conus medullaris terminates at the  level of L1 vertebral body. Visualized portions of the conus and the  spinal cord shows normal signal intensity and caliber.     Heterogeneous bone marrow signal intensity noted, likely related to  osteopenia/osteoporosis. Ankylosis of the L3 and L4 vertebral bodies  noted. Laminectomy changes at T11, L2, L3 and L5 levels are again  noted. Grade 1 anterolisthesis of L5 on S1, trace retrolisthesis of  L1 on L2.     Again note is made of the prompting of the nerve roots at L4-L5 level.     At T11-T12 there is moderate left paracentral disc bulge noted  causing moderate indentation of the left side of the thecal sac with  mild flattening and posterior displacement of the cord. No cord  edema. Laminectomy changes noted. Asymmetric right-sided facet joint  arthropathy, ligamentum flavum hypertrophy indenting the  posterolateral aspect of the thecal sac, there is moderate central  spinal canal stenosis. Mild encroachment of the bilateral neural  foramina.     At L1-L2 mild disc bulge, facet joint arthropathy noted causing  minimal encroachment of the spinal canal. Mild encroachment of the  bilateral neural foramina noted.     At L1-L2 scoliosis, broad-based disc osteophyte, mild facet joint  arthropathy in combination noted causing mild-to-moderate central  spinal canal stenosis, and right neural foramina narrowing. Mild  encroachment of the left neural foramina.     At L2-L3 laminectomy changes noted, scoliosis, broad-based disc  osteophyte, moderate facet joint arthropathy in combination noted  causing moderate to severe central spinal canal, severe left lateral  recess, moderate right lateral recess stenosis, severe left neural  foramina, moderate to severe right neural foramina narrowing.     At L3-L4 there is ankylosis of the L3-L4 vertebral bodies noted.  Laminectomy changes noted.  Mild facet joint arthropathy. No  significant central spinal canal or neural foramina stenosis.     At L4-L5 broad-based central disc protrusion noted, moderate facet  joint arthropathy noted causing mild encroachment of the spinal  canal, mild right neuroforaminal and mild-to-moderate left neural  narrowing.     At L5-S1 again note is made of the laminectomy changes. Moderate  facet joint arthropathy, asymmetric on the right side indenting the  posterolateral aspect of the right side of the thecal sac. No  significant central spinal canal stenosis, mild encroachment of the  bilateral neural foramina, left greater than the right.     IMPRESSION:  Postoperative, multilevel degenerative spinal canal or neural  foramina stenosis as described above, predominantly in the lower  thoracic and upper lumbar spine, worse changes are noted at L2-L3.  There is no significant change from prior.        Electronically signed by: MARCIA APDILLA  04/20/21 10:12      MRI Cervical without Contrast [id number] 11:56AM Regine Le   [Jun 2, 2020 3:40PM Rosalio Webb]  [facility] Intake Activity Log Entry by Rosalio Webb ([email or web address]) on 6/2/2020 3:37 PM  Status Change: To Confirmed - N/A   [Jun 2, 2020 3:40PM Rosalio Webb]  AMA Intake updated by Rosalio Webb ([email or web address]) on 6/2/2020 3:37 PM  New Recipient: Regine Le  New Appointment Date: May 28 2020 11:56AM   [May 27, 2020 12:33PM Regine Le]  Reason: Unspecified for MRI Cervical without Contrast      Test Name Result Flag Reference   MRI Cervical without Contrast (Report)       Interpreted by: IVETTE RAPHAEL  05/28/20 13:01  MRN: [id number]  Patient Name: LIZZIE PHILLIPS     STUDY:  MRI CERVICAL WO; 5/28/2020 11:56 am     INDICATION:  M96.1 Postlaminectomy syndrome, not elsewhere classified  M54.12  Radiculopathy, cervical region   neck pain.     COMPARISON:  March 4, 2015     ACCESSION NUMBER(S):  [id number]     ORDERING CLINICIAN:  REGINE LE     TECHNIQUE:  Sagittal T1, T2, STIR,  axial T1 and axial T2 weighted images were  acquired through the cervical spine.     FINDINGS:  Alignment: There is new, grade 1 anterolisthesis of C3 on C4 and to a  lesser extent of C2 on C3. There is subtle, grade 1 anterolisthesis  of C7 on T1, T1 on T2, and T2 on T3.     Vertebrae/Intervertebral Discs: There is artifact due to anterior  fusion hardware at C5 and C6. The marrow signal is otherwise  inhomogeneous throughout on T1 and T2 weighted imaging and there are  multilevel degenerative endplate signal changes. There is new  ill-defined abnormal diminished signal on T1 and increased signal on  STIR imaging within the odontoid process and extending into the  superior aspect of the vertebral body. There are degenerative changes  of the craniocervical articulations and at the atlantodental interval.     Cord: The cervical cord is degraded by artifact particularly from  C4-5 through C6-7.     C1-C2: There is ligamentous thickening surrounding the odontoid.  There is no central canal stenosis.     C2-C3: There is no posterior disc contour abnormality. Left greater  than right degenerative facet changes do not produce significant  central canal or neural foraminal stenosis.     C3-C4: There is diffuse disc bulging, more pronounced than seen  previously. There has been marked interval progression of  degenerative facet arthropathy on the left. Abnormal signal is noted  within the left facet on STIR and T2 weighted imaging. There is also  left greater than right uncovertebral joint hypertrophy. Overall  there is severe left and mild right-sided neural foraminal stenosis.  There is no significant narrowing of the central canal.     C4-C5: Evaluation is limited due to artifact from the surgical  hardware. Left greater than right degenerative facet arthropathy is  suspected. There is a posterior disc/osteophyte complex producing at  least partial effacement of the ventral subarachnoid space. There is  likely mild  central canal stenosis and left greater than right neural  foraminal narrowing.     C5-C6: Evaluation is markedly limited due to artifact from the  surgical hardware.     C6-C7: Evaluation is markedly limited due to artifact from the  surgical hardware. There does appear to be mild disc bulging and  endplate spurring without appreciable central canal stenosis.     C7-T1: There is minimal disc bulging without central canal stenosis.  Facet and uncovertebral joint hypertrophy produce mild neural  foraminal stenosis.     T1-2: There is mild narrowing of the central canal due to disc  protrusion.     The sagittal images demonstrate disc protrusions at T2-3 and T3-4  with partial effacement of the ventral subarachnoid space.     The prevertebral and posterior paraspinous soft tissues are within  normal limits.     IMPRESSION:  1. Postoperative changes at C5 and C6. Associated artifact limits the  examination.  2. New abnormal signal within the odontoid process extending into the  superior aspect of the C2 vertebral body is nonspecific. A subacute  fracture could give this appearance. Alternatively, the signal  changes could be related to an erosive arthritis. CT of the cervical  spine may be of benefit for additional characterization.  3. Grade 1 anterolisthesis of C3 on C4 is new from 2015. Abnormal  signal within the left C3-4 facet is thought to most likely be  degenerative in etiology. An infectious or inflammatory arthritis  could also give this appearance, however.  4. Multilevel degenerative changes of the cervical and upper thoracic  spine.     An Orange alert message was sent to the referring physician Dr. ANH PENA through the [facility] system at 1:01 pm on 5/28/2020 by  Dr.Jennifer Muñoz     Electronically signed by: IVETTE MUÑOZ  05/28/20 13:01       Review of Systems   Constitutional: Negative.    HENT: Negative.     Eyes: Negative.    Respiratory: Negative.     Cardiovascular: Negative.     Gastrointestinal: Negative.    Endocrine: Negative.    Genitourinary: Negative.    Musculoskeletal:  Positive for arthralgias, back pain, gait problem, joint swelling, myalgias and neck pain. Negative for neck stiffness.   Skin: Negative.    Allergic/Immunologic: Negative.    Neurological:  Positive for weakness and numbness. Negative for dizziness, tremors, seizures, syncope, facial asymmetry, speech difficulty, light-headedness and headaches.   Hematological: Negative.    Psychiatric/Behavioral: Negative.         Objective   Physical Exam  Vitals and nursing note reviewed.   Constitutional:       Appearance: Normal appearance.   HENT:      Head: Normocephalic and atraumatic.   Eyes:      Conjunctiva/sclera: Conjunctivae normal.   Cardiovascular:      Pulses: Normal pulses.   Pulmonary:      Effort: Pulmonary effort is normal. No respiratory distress.   Musculoskeletal:      Right lower leg: No edema.      Left lower leg: No edema.      Comments: Point tenderness over L4-L5 well-healed laminectomy scar and to the right of L4-L5.    Posture stooped.  Unable to stand erect due to pain and habitus.    Ambulates with guarded gait and rollator walker.   Skin:     General: Skin is warm and dry.      Capillary Refill: Capillary refill takes 2 to 3 seconds.   Neurological:      General: No focal deficit present.      Mental Status: She is alert and oriented to person, place, and time.      Cranial Nerves: No cranial nerve deficit.      Sensory: Sensory deficit present.      Motor: Weakness present.      Gait: Gait normal.      Comments: Allodynia to light touch L5 dermatomal distribution right posterior lateral hip and leg.    Weakness with resisted hip flexion, ankle dorsiflexion and plantarflexion 4 out of 5 both extremities.    Positive hip impingement to the right.    Negative straight leg raise.   Psychiatric:         Mood and Affect: Mood normal.         Behavior: Behavior normal.       Nesha was seen today for med  refill and pain.  Diagnoses and all orders for this visit:  Postlaminectomy syndrome, lumbar region (Primary)  -     gabapentin (Neurontin) 300 mg capsule; Take 1 capsule (300 mg) by mouth 2 times a day.  -     HYDROmorphone (Dilaudid) 2 mg tablet; Take 1 tablet (2 mg) by mouth 3 times a day as needed for moderate pain (4 - 6) for up to 28 days.  -     HYDROmorphone (Dilaudid) 2 mg tablet; Take 1 tablet (2 mg) by mouth 3 times a day as needed for moderate pain (4 - 6) for up to 28 days. Do not fill before October 7, 2024.  -     HYDROmorphone (Dilaudid) 2 mg tablet; Take 1 tablet (2 mg) by mouth 3 times a day as needed for moderate pain (4 - 6) for up to 28 days. Do not fill before November 4, 2024.  Spinal stenosis, lumbar region, without neurogenic claudication  -     gabapentin (Neurontin) 300 mg capsule; Take 1 capsule (300 mg) by mouth 2 times a day.  -     HYDROmorphone (Dilaudid) 2 mg tablet; Take 1 tablet (2 mg) by mouth 3 times a day as needed for moderate pain (4 - 6) for up to 28 days.  -     HYDROmorphone (Dilaudid) 2 mg tablet; Take 1 tablet (2 mg) by mouth 3 times a day as needed for moderate pain (4 - 6) for up to 28 days. Do not fill before October 7, 2024.  -     HYDROmorphone (Dilaudid) 2 mg tablet; Take 1 tablet (2 mg) by mouth 3 times a day as needed for moderate pain (4 - 6) for up to 28 days. Do not fill before November 4, 2024.  Psoriatic arthritis (Multi)  -     Infusion Appointment Request  -     gabapentin (Neurontin) 300 mg capsule; Take 1 capsule (300 mg) by mouth 2 times a day.  -     HYDROmorphone (Dilaudid) 2 mg tablet; Take 1 tablet (2 mg) by mouth 3 times a day as needed for moderate pain (4 - 6) for up to 28 days.  -     HYDROmorphone (Dilaudid) 2 mg tablet; Take 1 tablet (2 mg) by mouth 3 times a day as needed for moderate pain (4 - 6) for up to 28 days. Do not fill before October 7, 2024.  -     HYDROmorphone (Dilaudid) 2 mg tablet; Take 1 tablet (2 mg) by mouth 3 times a day as  needed for moderate pain (4 - 6) for up to 28 days. Do not fill before November 4, 2024.  Cervical myofascial pain syndrome  -     gabapentin (Neurontin) 300 mg capsule; Take 1 capsule (300 mg) by mouth 2 times a day.  -     HYDROmorphone (Dilaudid) 2 mg tablet; Take 1 tablet (2 mg) by mouth 3 times a day as needed for moderate pain (4 - 6) for up to 28 days.  -     HYDROmorphone (Dilaudid) 2 mg tablet; Take 1 tablet (2 mg) by mouth 3 times a day as needed for moderate pain (4 - 6) for up to 28 days. Do not fill before October 7, 2024.  -     HYDROmorphone (Dilaudid) 2 mg tablet; Take 1 tablet (2 mg) by mouth 3 times a day as needed for moderate pain (4 - 6) for up to 28 days. Do not fill before November 4, 2024.  Encounter for long-term use of opiate analgesic  -     naloxone (Narcan) 4 mg/0.1 mL nasal spray; Administer 1 spray (4 mg) into affected nostril(s) if needed for opioid reversal or respiratory depression. May repeat every 2-3 minutes if needed, alternating nostrils, until medical assistance becomes available.       Follow-up 12 weeks.    Reviewed and approved by JURGEN BEAVERS on 9/9/24 at 11:07 AM.

## 2024-09-11 ENCOUNTER — TELEPHONE (OUTPATIENT)
Dept: PRIMARY CARE | Facility: CLINIC | Age: 69
End: 2024-09-11
Payer: MEDICARE

## 2024-09-11 DIAGNOSIS — M25.562 ACUTE PAIN OF LEFT KNEE: Primary | ICD-10-CM

## 2024-09-11 NOTE — TELEPHONE ENCOUNTER
Patient calls to report she fell on Sunday 9/8/24 and has been experiencing left knee pain.  Area is swollen and painful to walk on.  Patient reports difficulty with ambulating on the left leg since the fall and is concerned as she had a left knee replacement 13 years ago.  Patient requesting an order for left knee xray of which she will go to a  site to have completed.  Please Advise.

## 2024-09-12 ENCOUNTER — HOSPITAL ENCOUNTER (OUTPATIENT)
Dept: RADIOLOGY | Facility: CLINIC | Age: 69
Discharge: HOME | End: 2024-09-12
Payer: MEDICARE

## 2024-09-12 DIAGNOSIS — M25.562 ACUTE PAIN OF LEFT KNEE: ICD-10-CM

## 2024-09-12 PROCEDURE — 73564 X-RAY EXAM KNEE 4 OR MORE: CPT | Mod: LEFT SIDE | Performed by: RADIOLOGY

## 2024-09-12 PROCEDURE — 73564 X-RAY EXAM KNEE 4 OR MORE: CPT | Mod: LT

## 2024-09-16 ENCOUNTER — TELEPHONE (OUTPATIENT)
Dept: PRIMARY CARE | Facility: CLINIC | Age: 69
End: 2024-09-16
Payer: MEDICARE

## 2024-09-16 NOTE — TELEPHONE ENCOUNTER
Result Communication    Resulted Orders   XR knee left 4+ views    Narrative    Interpreted By:  Jayy Marx,   STUDY:  XR KNEE LEFT 4+ VIEWS; ;  9/12/2024 11:21 am      INDICATION:  Signs/Symptoms:Fall and pain.      ,M25.562 Pain in left knee      COMPARISON:  10/10/2019      ACCESSION NUMBER(S):  [id number]      ORDERING CLINICIAN:  DORIS SANTOS      FINDINGS:  Left knee, four views      Total knee arthroplasty in place. There is no periprosthetic fracture  or lucency. There is no dislocation. There is a moderate-sized  effusion        Impression    No hardware failure about the left total knee arthroplasty      MACRO:  None      Signed by: Jayy Marx 9/13/2024 6:39 PM  Dictation workstation:   [id number]       2:20 PM      Results were successfully communicated with the patient and they acknowledged their understanding.  Pt seeing orthopedics next week for a different problem but will have knee evaluated if still bothering her. Pt reports golf ball size lump on knee that is slowly going down in size.   Doris Santos, APRN-CNP

## 2024-10-08 DIAGNOSIS — L40.50 PSORIATIC ARTHRITIS (MULTI): ICD-10-CM

## 2024-10-09 RX ORDER — LEFLUNOMIDE 10 MG/1
10 TABLET ORAL DAILY
Qty: 90 TABLET | Refills: 3 | Status: SHIPPED | OUTPATIENT
Start: 2024-10-09

## 2024-10-10 ENCOUNTER — TELEPHONE (OUTPATIENT)
Dept: PRIMARY CARE | Facility: CLINIC | Age: 69
End: 2024-10-10
Payer: MEDICARE

## 2024-10-10 NOTE — TELEPHONE ENCOUNTER
"Patient calling office to reschedule appointment, \"not feeling her best\", appointment rescheduled to 11/6/24 9:00 am with Angela Dyer NP  "

## 2024-10-11 ENCOUNTER — APPOINTMENT (OUTPATIENT)
Dept: PRIMARY CARE | Facility: CLINIC | Age: 69
End: 2024-10-11
Payer: MEDICARE

## 2024-10-17 ENCOUNTER — DOCUMENTATION (OUTPATIENT)
Dept: INFUSION THERAPY | Facility: CLINIC | Age: 69
End: 2024-10-17
Payer: MEDICARE

## 2024-10-17 DIAGNOSIS — L40.50 PSORIATIC ARTHRITIS (MULTI): Primary | ICD-10-CM

## 2024-10-17 NOTE — PROGRESS NOTES
"CLINICAL CLEARANCE FOR OUTPATIENT INFUSION      Patient to be scheduled for Continuation of Simponi Aria infusions - [place] patient - needs labs at first [facility] appt.     For Diagnosis: Psoriatic Arthritis    Dosing is weight based at: 2mg/kg  Using Dosing weight of: 68.9 kg  For a Total Dose of: 137.8 mg every 8 weeks thereafter (maintenance).    Labs…  T-spot:   Lab Results   Component Value Date    TBSIN Negative 07/20/2022    QFG NEGATIVE 07/30/2018      Hep B SAg Drawn/Results: No results found for: \"HAGCN\", \"HEPBSURABI\", \"HBSAG\", \"XHAGF\", \"HEPBSAG\", \"EXTHEPBSAG\", \"NONUHSWGH\"   Hep B Core Antibody: No results found for: \"HEPATOT\", \"HEPAIGM\", \"HEPBCIGM\", \"HEPBCAB\", \"HBEAG\", \"HEPCAB\" No results found for: \"HBCTI\", \"HEPBCAB\"  Hep C Antibody: No results found for: \"HEPATOT\", \"HEPAIGM\", \"HEPBCIGM\", \"HEPBCAB\", \"HBEAG\", \"HEPCAB\" No results found for: \"HEPCAB\"  CBC:  Lab Results   Component Value Date    WBC 6.3 08/27/2024    HGB 12.0 08/27/2024    HCT 37.3 08/27/2024    MCV 94 08/27/2024     08/27/2024        T-Spot Drawn/Results:   Lab Results   Component Value Date    TBSIN Negative 07/20/2022    QFG NEGATIVE 07/30/2018      No results found for: \"NONUHFIRE\", \"NONUHSWGH\", \"NONUHFISH\", \"EXTHEPBSAG\"    Does the patient have a diagnosis of heart failure? No  (may exacerbate HF)    Does the patient have a history of cancer, especially lymphomas? No    Does the patient have a history of demyelinating disorder such as MS? No    (If yes assure prescribing provider aware and okay to proceed)    Past Medical History:   Diagnosis Date    Anxiety disorder, unspecified     Anxiety    Arthritis     Arthropathic psoriasis, unspecified (Multi)     Psoriatic arthropathy    Chronic back pain     DM type 2 (diabetes mellitus, type 2) (Multi)     GERD (gastroesophageal reflux disease)     Hypertension     Injury of head 05/09/2024    Other chronic pain     Chronic pain    Other conditions influencing health status     " Osteoarthritis    Personal history of diseases of the skin and subcutaneous tissue     History of psoriasis    Personal history of other diseases of the musculoskeletal system and connective tissue     History of fibromyositis    Personal history of other endocrine, nutritional and metabolic disease     History of hypothyroidism      Patient Active Problem List   Diagnosis    Psoriatic arthritis (Multi)    Overactive bladder    Myalgia    Soft tissue mass    Neurogenic claudication due to lumbar spinal stenosis    Acquired partial lipodystrophy    Acute cystitis without hematuria    Acute renal failure syndrome (CMS-HCC)    ALT (SGPT) level raised    Altered mental status    Anal sphincter incontinence    Anxiety    Arthritis    Arthritis of neck    Autoimmune thyroiditis    Blepharitis    Blurred vision, bilateral    Cataract, nuclear sclerotic, both eyes    Cellulitis    Cervical radicular pain    Pain, radicular, lumbar    Choroidal nevus, left eye    Chronic intractable pain    Chronic low back pain    Clouded consciousness    Colovaginal fistula    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    Consecutive exotropia    Change in bowel habits    Constipation    Contact dermatitis    Controlled insulin dependent type 1 diabetes mellitus (Multi)    Type 2 diabetes mellitus    DDD (degenerative disc disease), thoracic    Dermatochalasis    Difficulty walking    Disorder of refraction    Disturbance, visual, subjective    Diverticulosis of colon    Dry eyes    Early cataract    Elevated creatine kinase    Essential (primary) hypertension    Fecal soiling due to fecal incontinence    Fistula involving female genital tract    Fracture of cervical vertebra    Gastroesophageal reflux disease    Generalized osteoarthritis of multiple sites    Hemorrhoids without complication    Iron deficiency anemia    Leukopenia    Lumbar radiculitis    Mixed hyperlipidemia    Myofascial pain    Numbness    Obesity with body mass index 30 or  greater    Palsy of conjugate gaze    Poor venous access    Incisional hernia    Postlaminectomy syndrome, lumbar region    Pseudophakia of both eyes    Psoriasis    Recurrent urinary tract infection    Retinopathy, background, nonproliferative, mild    Skin excoriation    Swelling of upper arm    Vaginal disorder    Weakness generalized      Past Medical History:   Diagnosis Date    Anxiety disorder, unspecified     Anxiety    Arthritis     Arthropathic psoriasis, unspecified (Multi)     Psoriatic arthropathy    Chronic back pain     DM type 2 (diabetes mellitus, type 2) (Multi)     GERD (gastroesophageal reflux disease)     Hypertension     Injury of head 05/09/2024    Other chronic pain     Chronic pain    Other conditions influencing health status     Osteoarthritis    Personal history of diseases of the skin and subcutaneous tissue     History of psoriasis    Personal history of other diseases of the musculoskeletal system and connective tissue     History of fibromyositis    Personal history of other endocrine, nutritional and metabolic disease     History of hypothyroidism        Last infusion received:  08/27/2024 (if continuation)    Due: 10/22/2024  Induction and Maintenance Therapy Plans entered if needed? Yes (if no prescribing provider to be contacted to enter)    Okay to schedule for treatment as ordered by prescribing provider.

## 2024-10-22 ENCOUNTER — APPOINTMENT (OUTPATIENT)
Dept: INFUSION THERAPY | Facility: CLINIC | Age: 69
End: 2024-10-22
Payer: MEDICARE

## 2024-10-22 ENCOUNTER — TRANSCRIBE ORDERS (OUTPATIENT)
Dept: INFUSION THERAPY | Facility: CLINIC | Age: 69
End: 2024-10-22

## 2024-10-22 VITALS
RESPIRATION RATE: 20 BRPM | OXYGEN SATURATION: 99 % | SYSTOLIC BLOOD PRESSURE: 110 MMHG | TEMPERATURE: 98.3 F | DIASTOLIC BLOOD PRESSURE: 67 MMHG | HEART RATE: 91 BPM | WEIGHT: 170 LBS | BODY MASS INDEX: 35.53 KG/M2

## 2024-10-22 DIAGNOSIS — L40.50 PSORIATIC ARTHRITIS (MULTI): ICD-10-CM

## 2024-10-22 LAB — HBV SURFACE AG SERPL QL IA: NONREACTIVE

## 2024-10-22 PROCEDURE — 86481 TB AG RESPONSE T-CELL SUSP: CPT

## 2024-10-22 PROCEDURE — 96365 THER/PROPH/DIAG IV INF INIT: CPT | Performed by: NURSE PRACTITIONER

## 2024-10-22 PROCEDURE — 87340 HEPATITIS B SURFACE AG IA: CPT

## 2024-10-22 RX ORDER — ALBUTEROL SULFATE 0.83 MG/ML
3 SOLUTION RESPIRATORY (INHALATION) AS NEEDED
OUTPATIENT
Start: 2024-11-25

## 2024-10-22 RX ORDER — HEPARIN SODIUM,PORCINE/PF 10 UNIT/ML
50 SYRINGE (ML) INTRAVENOUS AS NEEDED
OUTPATIENT
Start: 2024-10-22

## 2024-10-22 RX ORDER — HEPARIN 100 UNIT/ML
500 SYRINGE INTRAVENOUS AS NEEDED
OUTPATIENT
Start: 2024-10-22

## 2024-10-22 RX ORDER — FAMOTIDINE 10 MG/ML
20 INJECTION INTRAVENOUS ONCE AS NEEDED
OUTPATIENT
Start: 2024-11-25

## 2024-10-22 RX ORDER — EPINEPHRINE 0.3 MG/.3ML
0.3 INJECTION SUBCUTANEOUS EVERY 5 MIN PRN
OUTPATIENT
Start: 2024-11-25

## 2024-10-22 RX ORDER — DIPHENHYDRAMINE HYDROCHLORIDE 50 MG/ML
50 INJECTION INTRAMUSCULAR; INTRAVENOUS AS NEEDED
OUTPATIENT
Start: 2024-11-25

## 2024-10-22 ASSESSMENT — ENCOUNTER SYMPTOMS
MYALGIAS: 0
SORE THROAT: 0
SHORTNESS OF BREATH: 0
LEG SWELLING: 0
CHILLS: 0
COUGH: 0
ABDOMINAL PAIN: 0
DIARRHEA: 0
DIZZINESS: 0
HEADACHES: 0
APPETITE CHANGE: 0
ARTHRALGIAS: 0
TROUBLE SWALLOWING: 0
LIGHT-HEADEDNESS: 0
EXTREMITY WEAKNESS: 0
PALPITATIONS: 0
DYSURIA: 0
NUMBNESS: 0
BLOOD IN STOOL: 0
FREQUENCY: 0
CONSTIPATION: 0
UNEXPECTED WEIGHT CHANGE: 0
FATIGUE: 0
VOMITING: 0
FEVER: 0
WOUND: 0
NAUSEA: 0
VOICE CHANGE: 0
HEMATURIA: 0
WHEEZING: 0
EYE PROBLEMS: 0

## 2024-10-22 ASSESSMENT — PAIN SCALES - GENERAL: PAINLEVEL_OUTOF10: 10-WORST PAIN EVER

## 2024-10-22 NOTE — PATIENT INSTRUCTIONS
Today :We administered golimumab (Simponi Aria) 137.5 mg in sodium chloride 0.9% 100 mL IV.     For:   1. Psoriatic arthritis (Multi)         Your next appointment is due in:  56 days        Please read the  Medication Guide that was given to you and reviewed during todays visit.     (Tell all doctors including dentists that you are taking this medication)     Go to the emergency room or call 911 if:  -You have signs of allergic reaction:   -Rash, hives, itching.   -Swollen, blistered, peeling skin.   -Swelling of face, lips, mouth, tongue or throat.   -Tightness of chest, trouble breathing, swallowing or talking     Call your doctor:  - If IV / injection site gets red, warm, swollen, itchy or leaks fluid or pus.     (Leave dressing on your IV site for at least 2 hours and keep area clean and dry  - If you get sick or have symptoms of infection or are not feeling well for any reason.    (Wash your hands often, stay away from people who are sick)  - If you have side effects from your medication that do not go away or are bothersome.     (Refer to the teaching your nurse gave you for side effects to call your doctor about)    - Common side effects may include:  stuffy nose, headache, feeling tired, muscle aches, upset stomach  - Before receiving any vaccines     - Call the [facility] at   If:  - You get sick, are on antibiotics, have had a recent vaccine, have surgery or dental work and your doctor wants your visit rescheduled.  - You need to cancel and reschedule your visit for any reason. Call at least 2 days before your visit if you need to cancel.   - Your insurance changes before your next visit.    (We will need to get approval from your new insurance. This can take up to two weeks.)     The [facility] is opened Monday thru Friday. We are closed on weekends and holidays.   Voice mail will take your call if the center is closed. If you leave a message please allow 24 hours for a  call back during weekdays. If you leave a message on a weekend/holiday, we will call you back the next business day.

## 2024-10-22 NOTE — PROGRESS NOTES
[facility]   Infusion Clinic Note   Date: 2024   Name: Nesha Mcleod  : 1955   MRN: [id number]          Reason for Visit: OP Infusion (Simponi)         Today: We administered golimumab (Simponi Aria) 137.5 mg in sodium chloride 0.9% 100 mL IV.       Visit Type: INFUSION       Ordered By: Dr Valdes       Accompanied by:Self       Diagnosis: Psoriatic arthritis (Multi)        Allergies:   Allergies as of 10/22/2024 - Reviewed 10/22/2024   Allergen Reaction Noted    Methotrexate Swelling 10/11/2023    Trazodone Other 10/11/2023    Adhesive tape-silicones Hives 10/11/2023    Bleach (sodium hypochlorite) Hives, Unknown, and Itching 2016    Chlorine Unknown and Other 10/11/2023    Ciprofloxacin Other and Unknown 2011    Citalopram Other 10/11/2023    Codeine Hives and Rash 2011    Doxycycline Nausea And Vomiting and Unknown 10/11/2023    Latex Hives and Unknown 2011    Morphine Hives and Rash 2011    Penicillins Hives 10/11/2023    Soap Unknown and Hives 10/11/2023    Tetracycline Hives 10/11/2023    Amitriptyline Other 2011    Grass pollen Itching 2016    Infliximab Unknown 10/11/2023    Other Hives and Unknown 10/11/2023    Rubber, unspecified Unknown 10/11/2023    Trace metals Unknown 10/11/2023    Weed pollen Itching 2016    Methocarbamol Rash and Unknown 10/11/2023    Nickel Rash 10/11/2023    Sulfa (sulfonamide antibiotics) Nausea/vomiting and GI Upset 2011          Current Medications has a current medication list which includes the following prescription(s): acetaminophen, aspirin, atorvastatin, bupropion sr, cinnamon bark extract, cyclobenzaprine, docusate sodium, furosemide, gabapentin, golimumab, hydromorphone, hydromorphone, [START ON 2024] hydromorphone, leflunomide, levothyroxine, lisinopril, magnesium oxide, metformin, metformin, multivit-minerals/folic acid, naloxone, onetouch ultra test, potassium  gluconate, and tresiba flextouch u-100.       Vitals:   Vitals:    10/22/24 1001   BP: 110/67   Pulse: 91   Resp: 20   Temp: 36.8 °C (98.3 °F)   SpO2: 99%   Weight: 77.1 kg (170 lb)   PainSc: 10-Worst pain ever   PainLoc: Back             Infusion Pre-procedure Checklist:   - Allergies reviewed: yes   - Medications reviewed: yes       - Previous reaction to current treatment: no      Assess patient for the concerns below. Document provider notification as appropriate.  - Active or recent infection with/without current antibiotic use: no  - Recent or planned invasive dental work: no  - Recent or planned surgeries: no  - Recently received or plans to receive vaccinations: no  - Has treatment related toxicities: no  - Is pregnant:  n/a      Pain: 10   - Is the pain different from normal: no   - Is your Doctor aware:  yes       Labs: Labs drawn and sent per order          Fall Risk Screening: Fan Fall Risk  History of Falling, Immediate or Within 3 Months: Yes  Secondary Diagnosis: Yes  Ambulatory Aid: Crutches/cane/walker  Intravenous Therapy/Heparin Lock: Yes  Gait/Transferring: Normal/bedrest/immobile  Mental Status: Oriented to own ability  Fan Fall Risk Score: 75       Review Of Systems:  Review of Systems   Constitutional:  Negative for appetite change, chills, fatigue, fever and unexpected weight change.   HENT:   Negative for hearing loss, mouth sores, sore throat, tinnitus, trouble swallowing and voice change.    Eyes:  Negative for eye problems.   Respiratory:  Negative for cough, shortness of breath and wheezing.    Cardiovascular:  Negative for chest pain, leg swelling and palpitations.   Gastrointestinal:  Negative for abdominal pain, blood in stool, constipation, diarrhea, nausea and vomiting.   Genitourinary:  Negative for dysuria, frequency and hematuria.    Musculoskeletal:  Negative for arthralgias and myalgias.   Skin:  Negative for itching, rash and wound.   Neurological:  Negative for dizziness,  "extremity weakness, headaches, light-headedness and numbness.         ROS completed? Yes      Infusion Readiness:  - Assessment Concerns Related to Infusion: No  - Provider notified: n/a      Document Below Only If Indicated:   New Patient Education:    N/A (returning patient for continuation of therapy. Ongoing education provided as needed.)        Treatment Conditions & Drug Specific Questions:    Golimumab  (SIMPONI)    (Unless otherwise specified on patient specific therapy plan):     TREATMENT CONDITIONS:  Unless otherwise specified on patient specific thearpy plan HOLD and notify provider prior to proceeding with today's infusion if patient with:  o Positive T-Spot  o Positive Hepatitis B Surface Ag / Hepatitis C     Lab Results   Component Value Date    TBSIN Negative 07/20/2022    QFG NEGATIVE 07/30/2018      No results found for: \"HAGCN\", \"HEPBSURABI\", \"HBSAG\", \"XHAGF\", \"HEPBSAG\", \"EXTHEPBSAG\", \"NONUHSWGH\"   No results found for: \"NONUHFIRE\", \"NONUHSWGH\", \"NONUHFISH\", \"EXTHEPBSAG\"  No results found for: \"HEPATOT\", \"HEPAIGM\", \"HEPBCIGM\", \"HEPBCAB\", \"HBEAG\", \"HEPCAB\"   No results found for: \"HEPCAB\"  No results found for: \"HBCTI\", \"HEPBCAB\"    Lab Results   Component Value Date    WBC 6.3 08/27/2024    HGB 12.0 08/27/2024    HCT 37.3 08/27/2024    MCV 94 08/27/2024     08/27/2024        Labs reviewed and patient meets treatment conditions? Yes    DRUG SPECIFIC QUESTIONS:   - Up to date on all immunizations per patient report? Yes    Available Immunization Records:  Immunization History   Administered Date(s) Administered    DTaP, Unspecified 12/05/2019    Flu vaccine (IIV4), preservative free *Check age/dose* 09/28/2013, 09/14/2016, 10/06/2020    Flu vaccine, trivalent, preservative free, HIGH-DOSE, age 65y+ (Fluzone) 09/04/2019, 09/09/2023    Influenza, seasonal, injectable 10/09/2014, 10/26/2015    Novel influenza-H1N1-09, preservative-free 12/23/2009    Pfizer Gray Cap SARS-CoV-2 04/25/2022    " Pfizer Purple Cap SARS-CoV-2 01/17/2021, 03/27/2021, 08/20/2021    Pneumococcal conjugate vaccine, 20-valent (PREVNAR 20) 05/27/2022    Pneumococcal polysaccharide vaccine, 23-valent, age 2 years and older (PNEUMOVAX 23) 10/13/2014, 01/13/2017    Tdap vaccine, age 7 year and older (BOOSTRIX, ADACEL) 05/27/2022    Zoster, live 11/03/2012         - Any history of or new or worsening s/s of heart failure which may include dyspnea, edema? No  (If worsening s/s notify provider prior to proceeding. Simponi may cause exacerbation of heart failure)     - Any new diagnosis of cancer, especially lymphomas? No    (Box Warning: Malignancy. If YES notify prescribing provider prior to proceeding )      REMINDER:  WEIGHT BASED DRUG    Recommended Vitals/Observation:  Vitals: Take vital signs prior to starting infusion, at infusion conclusion and as needed.   Observation: No observation.        Weight Based Drug Calculations:    WEIGHT BASED DRUGS: Golimumab (SIMPONI)   Patient's dosing weight (kg): 68.9     10% weight variance for prescribed treatment: 62 kg to 75.8 kg     Patient's weight today:   Vitals:    10/22/24 1001   Weight: 77.1 kg (170 lb)         weight range for prescribed dose:     Patient weight today falls outside of 10% variance or  weight range: Yes     Home Care pharmacist informed of weight variance: Yes    Doses that are weight based have an acceptable variance rule within 10% of the prescribed   order and/or within  weight range. If patient weight on day of infusion falls   outside of the 10% variance, or weight range, infusion is administered and   pharmacy contacted regarding future dosing adjustments, per policy.         Initiated By: Francisca Willis RN

## 2024-10-24 LAB
NIL(NEG) CONTROL SPOT COUNT: NORMAL
PANEL A SPOT COUNT: 0
PANEL B SPOT COUNT: 2
POS CONTROL SPOT COUNT: NORMAL
T-SPOT. TB INTERPRETATION: NEGATIVE

## 2024-11-05 ENCOUNTER — APPOINTMENT (OUTPATIENT)
Dept: RHEUMATOLOGY | Facility: CLINIC | Age: 69
End: 2024-11-05
Payer: MEDICARE

## 2024-11-05 ENCOUNTER — TELEPHONE (OUTPATIENT)
Dept: PRIMARY CARE | Facility: CLINIC | Age: 69
End: 2024-11-05

## 2024-11-05 DIAGNOSIS — Z79.899 ENCOUNTER FOR LONG-TERM (CURRENT) USE OF MEDICATIONS: ICD-10-CM

## 2024-11-05 DIAGNOSIS — L40.50 PSORIATIC ARTHRITIS (MULTI): Primary | ICD-10-CM

## 2024-11-05 PROCEDURE — 99212 OFFICE O/P EST SF 10 MIN: CPT | Performed by: INTERNAL MEDICINE

## 2024-11-05 PROCEDURE — 3044F HG A1C LEVEL LT 7.0%: CPT | Performed by: INTERNAL MEDICINE

## 2024-11-05 PROCEDURE — G2211 COMPLEX E/M VISIT ADD ON: HCPCS | Performed by: INTERNAL MEDICINE

## 2024-11-05 PROCEDURE — 3048F LDL-C <100 MG/DL: CPT | Performed by: INTERNAL MEDICINE

## 2024-11-05 PROCEDURE — 4010F ACE/ARB THERAPY RXD/TAKEN: CPT | Performed by: INTERNAL MEDICINE

## 2024-11-05 PROCEDURE — 1159F MED LIST DOCD IN RCRD: CPT | Performed by: INTERNAL MEDICINE

## 2024-11-05 ASSESSMENT — ENCOUNTER SYMPTOMS
ROS SKIN COMMENTS: PSORIASIS
ARTHRALGIAS: 1
FATIGUE: 1
CONSTIPATION: 1
DIARRHEA: 1
BACK PAIN: 1

## 2024-11-05 NOTE — PROGRESS NOTES
"Subjective   Patient ID: Nesha Mcleod is a 69 y.o. female who presents for Follow-up (6 mo fuv).  HPI  Patient with history of psoriatic arthritis.  Previously had been on methotrexate, Remicade, Cimzia.    Patient was last seen in May and at that time we had her continue with Simponi Aria infusions.    Having trouble with bowel incontinence.  They think it's because of all the narcotics   She had seen Dr. Rock and was told that her \"spine has closed up.\"  She has had falls.  Just had a fall a week ago in her apartment  She doesn't go out too much anymore.  Her NP comes to her appointment.  Has some stressors with the cost of her insurance and apartment.  Is wondering if there is anything else she can do for her psoriatic arthritis as the cost of her supplemental insurance has gone up.  No recent infections that she endorses.    Review of Systems   Constitutional:  Positive for fatigue.   Gastrointestinal:  Positive for constipation and diarrhea.   Musculoskeletal:  Positive for arthralgias, back pain and gait problem.   Skin:         Psoriasis       Objective   Physical Exam  Unable to physically evaluate    Assessment/Plan     Psoriatic arthritis previous medications have included methotrexate, Remicade, Cimzia  ` -She has had improvement with Simponi Aria infusions.  When she was off she had increasing pain and difficulty with ambulation and now that she is back on she has been better.  She still has chronic spine issues.  Is with pain management.  Encouraged her to continue with strengthening.  Reviewed her recent blood work.  Will have her continue with her current regimen.    Will see her again in 6 months or as needed     Spent 10 minutes with greater than then 50% directly with patient.   Sheila Valdes MD 11/05/24 11:35 AM   "

## 2024-11-05 NOTE — TELEPHONE ENCOUNTER
Phoned patient in follow up, 11/6/24 House Calls visit with Angela Dyer NP confirmed via phone with patient.

## 2024-11-06 ENCOUNTER — OFFICE VISIT (OUTPATIENT)
Dept: PRIMARY CARE | Facility: CLINIC | Age: 69
End: 2024-11-06
Payer: MEDICARE

## 2024-11-06 VITALS
SYSTOLIC BLOOD PRESSURE: 118 MMHG | DIASTOLIC BLOOD PRESSURE: 68 MMHG | HEART RATE: 91 BPM | TEMPERATURE: 97.7 F | WEIGHT: 169 LBS | BODY MASS INDEX: 35.32 KG/M2 | RESPIRATION RATE: 18 BRPM | OXYGEN SATURATION: 96 %

## 2024-11-06 DIAGNOSIS — Z79.4 TYPE 2 DIABETES MELLITUS WITHOUT COMPLICATION, WITH LONG-TERM CURRENT USE OF INSULIN (MULTI): ICD-10-CM

## 2024-11-06 DIAGNOSIS — E06.3 AUTOIMMUNE THYROIDITIS: ICD-10-CM

## 2024-11-06 DIAGNOSIS — R29.6 FREQUENT FALLS: ICD-10-CM

## 2024-11-06 DIAGNOSIS — I10 ESSENTIAL (PRIMARY) HYPERTENSION: Primary | ICD-10-CM

## 2024-11-06 DIAGNOSIS — L40.50 PSORIATIC ARTHRITIS (MULTI): ICD-10-CM

## 2024-11-06 DIAGNOSIS — G89.29 CHRONIC INTRACTABLE PAIN: ICD-10-CM

## 2024-11-06 DIAGNOSIS — E11.9 TYPE 2 DIABETES MELLITUS WITHOUT COMPLICATION, WITH LONG-TERM CURRENT USE OF INSULIN (MULTI): ICD-10-CM

## 2024-11-06 PROCEDURE — 99349 HOME/RES VST EST MOD MDM 40: CPT | Performed by: NURSE PRACTITIONER

## 2024-11-06 PROCEDURE — 3078F DIAST BP <80 MM HG: CPT | Performed by: NURSE PRACTITIONER

## 2024-11-06 PROCEDURE — 4010F ACE/ARB THERAPY RXD/TAKEN: CPT | Performed by: NURSE PRACTITIONER

## 2024-11-06 PROCEDURE — 1159F MED LIST DOCD IN RCRD: CPT | Performed by: NURSE PRACTITIONER

## 2024-11-06 PROCEDURE — 3044F HG A1C LEVEL LT 7.0%: CPT | Performed by: NURSE PRACTITIONER

## 2024-11-06 PROCEDURE — 3048F LDL-C <100 MG/DL: CPT | Performed by: NURSE PRACTITIONER

## 2024-11-06 PROCEDURE — 1125F AMNT PAIN NOTED PAIN PRSNT: CPT | Performed by: NURSE PRACTITIONER

## 2024-11-06 PROCEDURE — 3074F SYST BP LT 130 MM HG: CPT | Performed by: NURSE PRACTITIONER

## 2024-11-06 PROCEDURE — 1160F RVW MEDS BY RX/DR IN RCRD: CPT | Performed by: NURSE PRACTITIONER

## 2024-11-06 ASSESSMENT — ENCOUNTER SYMPTOMS
MYALGIAS: 1
PSYCHIATRIC NEGATIVE: 1
ENDOCRINE NEGATIVE: 1
NECK STIFFNESS: 1
ACTIVITY CHANGE: 1
WEAKNESS: 1
ALLERGIC/IMMUNOLOGIC NEGATIVE: 1
BACK PAIN: 1
HEMATOLOGIC/LYMPHATIC NEGATIVE: 1
FATIGUE: 1
CONSTIPATION: 1
RESPIRATORY NEGATIVE: 1
FREQUENCY: 1
EYES NEGATIVE: 1
ARTHRALGIAS: 1
NECK PAIN: 1

## 2024-11-06 ASSESSMENT — PAIN SCALES - GENERAL: PAINLEVEL_OUTOF10: 10-WORST PAIN EVER

## 2024-11-06 NOTE — PROGRESS NOTES
Subjective   Patient ID: Nesha Mcleod is a 69 y.o. female who is beign seen for routine 2 month house calls follow up.    HPI Pt seen in single family apartment unaccompanied. PMHx: Chronic pain, DDD, HTN, HLD, DM, Hypothyroid, Psoriatic arthritis, GERD. Pt seen sitting in chair with legs dependent. Pt alert and oriented and cooperative with examination. Pt reports pain in back today 11/10. Pt going tomorrow to be fitted for a wig due to hair falling out. Pt reports a fall a week ago walking with walker and left leg gave out. Pt denies injuries was just sore. Pt reports bowels giving her issues with frequent bowel movements with going and then having to go every half hour or so, not happening every day. Pt reprots stool stuck and not coming out when she returns to bathroom after first bowel movement. Lucio Zamora states it is occurring due to her spinal issues. Pt had telemedicine appointment with Dr. Valdes yesterday as her bowels were causing issues and she could not make it to appointment. Pt still getting meals on wheels but is not liking how many carb's they give, going to start getting there alternative meals on occasion.   Blood sugars reported as running good, hasn't had to use insulin in 2 weeks. Last night 121. Ranging 121 to 160. Denies any s/s of hypo/hyperglycemia. Pt checking blood pressure on occasion, hasn't taken blood pressure pill in 2 days. Pt reports energy as not having any. Appetite reported as good eating too good. Sleeping on couch, doesn't like mattress. Wakes up during the night to urinate. Pt denies fever, chills, night sweats, dizziness or headaches. Pt denies trouble chewing or swallowing. Denies SOB, cough, CP.or palpitations. Pt denies N/V/D admits to constipation. Pt denies urinary symptoms. Admits to knee, back and shoulder pain. Pt with no other concerns or complaints.     Home Visit medically necessary due to: pt has chronic condition that makes access to a traditional office  visit very difficult, illness or condition that results in activity limitation or restriction that impacts the ability to leave home such as; unsteady gait/ poor condition.      Review of Systems   Constitutional:  Positive for activity change and fatigue.   HENT: Negative.     Eyes: Negative.    Respiratory: Negative.     Cardiovascular:  Positive for leg swelling (L>R).   Gastrointestinal:  Positive for constipation.   Endocrine: Negative.    Genitourinary:  Positive for frequency and urgency.   Musculoskeletal:  Positive for arthralgias, back pain, gait problem, myalgias, neck pain and neck stiffness.   Skin: Negative.    Allergic/Immunologic: Negative.    Neurological:  Positive for weakness.   Hematological: Negative.    Psychiatric/Behavioral: Negative.         Objective   /68 (BP Location: Left arm, Patient Position: Sitting, BP Cuff Size: Adult)   Pulse 91   Temp 36.5 °C (97.7 °F) (Temporal)   Resp 18   Wt 76.7 kg (169 lb)   SpO2 96%   BMI 35.32 kg/m²     Current Outpatient Medications:     acetaminophen (Tylenol) 325 mg tablet, Take 2 tablets (650 mg) by mouth every 4 hours if needed for mild pain (1 - 3), headaches or fever (temp greater than 38.0 C). THIS ORDER IS SUPERSEDED BY ANY OTHER MEDICATION ORDER FOR MILD PAIN (1-3). MAY BE USED FOR A HIGHER PAIN SCORE IF PATIENT REQUESTS, Disp: , Rfl:     aspirin 81 mg chewable tablet, Chew 1 tablet (81 mg) once daily. HOLD IF GIVEN IN LAST 24 HOURS OR IF HISTORY OF HYPERSENSITIVITY., Disp: , Rfl:     atorvastatin (Lipitor) 40 mg tablet, TAKE 1 TABLET BY MOUTH ONCE  DAILY, Disp: 90 tablet, Rfl: 3    buPROPion SR (Wellbutrin SR) 100 mg 12 hr tablet, TAKE 1 TABLET BY MOUTH ONCE  DAILY DO NOT CRUSH, CHEW, OR  SPLIT, Disp: 90 tablet, Rfl: 3    cinnamon bark extract 500 mg tablet, Cinnamon 500 MG as directed Orally daily Active, Disp: , Rfl:     cyclobenzaprine (Flexeril) 10 mg tablet, Take 1 tablet (10 mg) by mouth 2 times a day as needed for muscle  spasms., Disp: 60 tablet, Rfl: 5    furosemide (Lasix) 40 mg tablet, TAKE 1 TABLET BY MOUTH ONCE  DAILY, Disp: 90 tablet, Rfl: 3    gabapentin (Neurontin) 300 mg capsule, Take 1 capsule (300 mg) by mouth 2 times a day., Disp: 60 capsule, Rfl: 2    HYDROmorphone (Dilaudid) 2 mg tablet, Take 1 tablet (2 mg) by mouth 3 times a day as needed for moderate pain (4 - 6) for up to 28 days. Do not fill before November 4, 2024., Disp: 84 tablet, Rfl: 0    leflunomide (Arava) 10 mg tablet, TAKE 1 TABLET BY MOUTH ONCE  DAILY, Disp: 90 tablet, Rfl: 3    levothyroxine (Synthroid, Levoxyl) 75 mcg tablet, TAKE 1 TABLET BY MOUTH ONCE  DAILY IN THE MORNING BEFORE A  MEAL, Disp: 90 tablet, Rfl: 3    lisinopril 20 mg tablet, TAKE 1 TABLET BY MOUTH ONCE  DAILY, Disp: 90 tablet, Rfl: 3    magnesium oxide (Mag-Ox) 250 mg magnesium tablet, Take 1 tablet (250 mg) by mouth once daily., Disp: , Rfl:     metFORMIN (Glucophage) 500 mg tablet, Take 1 tablet (500 mg) by mouth 2 times a day with meals., Disp: 180 tablet, Rfl: 3    multivit-minerals/folic acid (CENTRUM ADULTS ORAL), Take by mouth once daily., Disp: , Rfl:     naloxone (Narcan) 4 mg/0.1 mL nasal spray, Administer 1 spray (4 mg) into affected nostril(s) if needed for opioid reversal or respiratory depression. May repeat every 2-3 minutes if needed, alternating nostrils, until medical assistance becomes available., Disp: 2 each, Rfl: 0    OneTouch Ultra Test strip, Apply 1 strip topically once daily., Disp: 100 strip, Rfl: 3    potassium gluconate 595 mg (99 mg) tablet, Take 1 tablet by mouth once daily., Disp: , Rfl:     sodium chloride 0.9% parenteral solution with golimumab 12.5 mg/mL solution 2 mg/kg, Infuse 2 mg/kg into a venous catheter every 8 (eight) weeks., Disp: , Rfl:     Tresiba FlexTouch U-100 100 unit/mL (3 mL) injection, Inject 8 Units under the skin once daily in the morning., Disp: 9 mL, Rfl: 3    HYDROmorphone (Dilaudid) 2 mg tablet, Take 1 tablet (2 mg) by mouth 3  times a day as needed for moderate pain (4 - 6) for up to 28 days., Disp: 84 tablet, Rfl: 0    HYDROmorphone (Dilaudid) 2 mg tablet, Take 1 tablet (2 mg) by mouth 3 times a day as needed for moderate pain (4 - 6) for up to 28 days. Do not fill before October 7, 2024., Disp: 84 tablet, Rfl: 0    metFORMIN (Glucophage) 500 mg tablet, Take 1 tablet (500 mg) by mouth 2 times a day with meals for 14 days., Disp: 28 tablet, Rfl: 0    Physical Exam  Constitutional:       Appearance: Normal appearance. She is obese.   HENT:      Head: Normocephalic and atraumatic.      Nose: Nose normal.      Mouth/Throat:      Mouth: Mucous membranes are moist.      Pharynx: Oropharynx is clear.   Eyes:      Conjunctiva/sclera: Conjunctivae normal.      Pupils: Pupils are equal, round, and reactive to light.   Cardiovascular:      Rate and Rhythm: Normal rate and regular rhythm.      Pulses: Normal pulses.      Heart sounds: Normal heart sounds.   Pulmonary:      Effort: Pulmonary effort is normal.      Breath sounds: Examination of the right-upper field reveals decreased breath sounds. Examination of the left-upper field reveals decreased breath sounds. Examination of the right-middle field reveals decreased breath sounds. Examination of the left-middle field reveals decreased breath sounds. Examination of the right-lower field reveals decreased breath sounds. Examination of the left-lower field reveals decreased breath sounds. Decreased breath sounds present.   Abdominal:      General: Bowel sounds are normal.      Palpations: Abdomen is soft.   Musculoskeletal:         General: Swelling (ankles) and tenderness (lower extremties) present.      Cervical back: Normal range of motion and neck supple. Tenderness present.   Skin:     General: Skin is warm and dry.      Capillary Refill: Capillary refill takes less than 2 seconds.   Neurological:      Mental Status: She is alert and oriented to person, place, and time.      Motor: Weakness  present.      Gait: Gait abnormal.   Psychiatric:         Mood and Affect: Mood normal.         Behavior: Behavior normal.         Thought Content: Thought content normal.         Judgment: Judgment normal.     Patient Active Problem List   Diagnosis    Psoriatic arthritis (Multi)    Overactive bladder    Myalgia    Soft tissue mass    Neurogenic claudication due to lumbar spinal stenosis    Acquired partial lipodystrophy    Acute cystitis without hematuria    Acute renal failure syndrome (CMS-HCC)    ALT (SGPT) level raised    Altered mental status    Anal sphincter incontinence    Anxiety    Arthritis    Arthritis of neck    Autoimmune thyroiditis    Blepharitis    Blurred vision, bilateral    Cataract, nuclear sclerotic, both eyes    Cellulitis    Cervical radicular pain    Pain, radicular, lumbar    Choroidal nevus, left eye    Chronic intractable pain    Chronic low back pain    Clouded consciousness    Colovaginal fistula    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    Consecutive exotropia    Change in bowel habits    Constipation    Contact dermatitis    Controlled insulin dependent type 1 diabetes mellitus (Multi)    Type 2 diabetes mellitus    DDD (degenerative disc disease), thoracic    Dermatochalasis    Difficulty walking    Disorder of refraction    Disturbance, visual, subjective    Diverticulosis of colon    Dry eyes    Early cataract    Elevated creatine kinase    Essential (primary) hypertension    Fecal soiling due to fecal incontinence    Fistula involving female genital tract    Fracture of cervical vertebra    Gastroesophageal reflux disease    Generalized osteoarthritis of multiple sites    Hemorrhoids without complication    Iron deficiency anemia    Leukopenia    Lumbar radiculitis    Mixed hyperlipidemia    Myofascial pain    Numbness    Obesity with body mass index 30 or greater    Palsy of conjugate gaze    Poor venous access    Incisional hernia    Postlaminectomy syndrome, lumbar region     Pseudophakia of both eyes    Psoriasis    Recurrent urinary tract infection    Retinopathy, background, nonproliferative, mild    Skin excoriation    Swelling of upper arm    Vaginal disorder    Weakness generalized     Preventative Medicine Counseling: Medication Education: Education: Current medication list reviewed and discussed, understanding, patient expressed understanding, adherence: No barriers to adherence identified.      Assessment/Plan   Diagnoses and all orders for this visit:  Essential (primary) hypertension  Comments:  Continue Lisinopril 20 mg po daily.  Type 2 diabetes mellitus without complication, with long-term current use of insulin (Multi)  Comments:  Coontinue Metformin 500 mg po twice daily.   Continue Tresiba 8 units sq daily.  Check blood sugars daily.  Autoimmune thyroiditis  Comments:  Continue Levothyroxine 75 mcg po daily.  Psoriatic arthritis (Multi)  Comments:  Continue Arava 10 mg po daily.   Continue Simponi infusions every 8 weeks.  Frequent falls  Chronic intractable pain  Comments:  Continue Fexeril 10 mg po tiwce a day as needed for muscle spasms  Continue Diluadid 2 mg po tree times a day as needed for pain.    Follow up in 2 months with labs or prn.  Angela Dyer, APRN-CNP

## 2024-11-11 ENCOUNTER — TELEPHONE (OUTPATIENT)
Dept: PRIMARY CARE | Facility: CLINIC | Age: 69
End: 2024-11-11
Payer: MEDICARE

## 2024-11-11 DIAGNOSIS — L03.114 CELLULITIS OF LEFT UPPER EXTREMITY: Primary | ICD-10-CM

## 2024-11-11 RX ORDER — CLINDAMYCIN HYDROCHLORIDE 150 MG/1
300 CAPSULE ORAL 2 TIMES DAILY
Qty: 40 CAPSULE | Refills: 0 | Status: SHIPPED | OUTPATIENT
Start: 2024-11-11 | End: 2024-11-21

## 2024-11-11 NOTE — TELEPHONE ENCOUNTER
"Patient calls to report her upper arm, left side is swollen now with a red spot. Patient reports the red area is warm to touch, on the outside of her upper left arm which is swollen.  Patient states are is NOT painful to touch and has not at this caused her any additional pain.  Patient reporting she is able to move her arm per usual, just is swollen \"twice the size\" as it usually looks.   Patient denies any recent immunizations to area, denies trauma to area.  Please Advise  "

## 2024-12-02 ENCOUNTER — TELEMEDICINE (OUTPATIENT)
Dept: PAIN MEDICINE | Facility: CLINIC | Age: 69
End: 2024-12-02
Payer: MEDICARE

## 2024-12-02 ENCOUNTER — TELEPHONE (OUTPATIENT)
Dept: PAIN MEDICINE | Facility: CLINIC | Age: 69
End: 2024-12-02
Payer: MEDICARE

## 2024-12-02 DIAGNOSIS — M79.18 CERVICAL MYOFASCIAL PAIN SYNDROME: ICD-10-CM

## 2024-12-02 DIAGNOSIS — M96.1 POSTLAMINECTOMY SYNDROME, LUMBAR REGION: Primary | ICD-10-CM

## 2024-12-02 DIAGNOSIS — L40.50 PSORIATIC ARTHRITIS (MULTI): ICD-10-CM

## 2024-12-02 DIAGNOSIS — M19.90 ARTHRITIS: ICD-10-CM

## 2024-12-02 DIAGNOSIS — L40.50 PSORIATIC ARTHRITIS (MULTI): Primary | ICD-10-CM

## 2024-12-02 DIAGNOSIS — M48.062 NEUROGENIC CLAUDICATION DUE TO LUMBAR SPINAL STENOSIS: ICD-10-CM

## 2024-12-02 PROCEDURE — 1159F MED LIST DOCD IN RCRD: CPT | Performed by: NURSE PRACTITIONER

## 2024-12-02 PROCEDURE — 3044F HG A1C LEVEL LT 7.0%: CPT | Performed by: NURSE PRACTITIONER

## 2024-12-02 PROCEDURE — 4010F ACE/ARB THERAPY RXD/TAKEN: CPT | Performed by: NURSE PRACTITIONER

## 2024-12-02 PROCEDURE — 1036F TOBACCO NON-USER: CPT | Performed by: NURSE PRACTITIONER

## 2024-12-02 PROCEDURE — 3048F LDL-C <100 MG/DL: CPT | Performed by: NURSE PRACTITIONER

## 2024-12-02 PROCEDURE — 99213 OFFICE O/P EST LOW 20 MIN: CPT | Performed by: NURSE PRACTITIONER

## 2024-12-02 PROCEDURE — 1160F RVW MEDS BY RX/DR IN RCRD: CPT | Performed by: NURSE PRACTITIONER

## 2024-12-02 RX ORDER — GABAPENTIN 300 MG/1
300 CAPSULE ORAL 2 TIMES DAILY
Qty: 60 CAPSULE | Refills: 2 | Status: SHIPPED | OUTPATIENT
Start: 2024-12-08 | End: 2025-03-08

## 2024-12-02 RX ORDER — DIPHENHYDRAMINE HYDROCHLORIDE 50 MG/ML
50 INJECTION INTRAMUSCULAR; INTRAVENOUS AS NEEDED
OUTPATIENT
Start: 2024-12-02

## 2024-12-02 RX ORDER — EPINEPHRINE 0.3 MG/.3ML
0.3 INJECTION SUBCUTANEOUS EVERY 5 MIN PRN
OUTPATIENT
Start: 2024-12-02

## 2024-12-02 RX ORDER — HYDROMORPHONE HYDROCHLORIDE 2 MG/1
2 TABLET ORAL 3 TIMES DAILY PRN
Qty: 84 TABLET | Refills: 0 | Status: SHIPPED | OUTPATIENT
Start: 2024-12-02 | End: 2024-12-30

## 2024-12-02 RX ORDER — FAMOTIDINE 10 MG/ML
20 INJECTION INTRAVENOUS ONCE AS NEEDED
OUTPATIENT
Start: 2024-12-02

## 2024-12-02 RX ORDER — HYDROMORPHONE HYDROCHLORIDE 2 MG/1
2 TABLET ORAL 3 TIMES DAILY PRN
Qty: 84 TABLET | Refills: 0 | Status: SHIPPED | OUTPATIENT
Start: 2025-01-27 | End: 2025-02-24

## 2024-12-02 RX ORDER — ALBUTEROL SULFATE 0.83 MG/ML
3 SOLUTION RESPIRATORY (INHALATION) AS NEEDED
OUTPATIENT
Start: 2024-12-02

## 2024-12-02 RX ORDER — CYCLOBENZAPRINE HCL 10 MG
10 TABLET ORAL 2 TIMES DAILY PRN
Qty: 60 TABLET | Refills: 2 | Status: SHIPPED | OUTPATIENT
Start: 2024-12-02

## 2024-12-02 RX ORDER — HYDROMORPHONE HYDROCHLORIDE 2 MG/1
2 TABLET ORAL 3 TIMES DAILY PRN
Qty: 84 TABLET | Refills: 0 | Status: SHIPPED | OUTPATIENT
Start: 2024-12-30 | End: 2025-01-27

## 2024-12-02 ASSESSMENT — ENCOUNTER SYMPTOMS
GASTROINTESTINAL NEGATIVE: 1
BACK PAIN: 1
JOINT SWELLING: 1
HEADACHES: 0
DIZZINESS: 0
ALLERGIC/IMMUNOLOGIC NEGATIVE: 1
NECK PAIN: 1
NECK STIFFNESS: 0
ENDOCRINE NEGATIVE: 1
FACIAL ASYMMETRY: 0
HEMATOLOGIC/LYMPHATIC NEGATIVE: 1
RESPIRATORY NEGATIVE: 1
ARTHRALGIAS: 1
MYALGIAS: 1
WEAKNESS: 1
PSYCHIATRIC NEGATIVE: 1
LIGHT-HEADEDNESS: 0
CONSTITUTIONAL NEGATIVE: 1
CARDIOVASCULAR NEGATIVE: 1
SPEECH DIFFICULTY: 0
TREMORS: 0
SEIZURES: 0
NUMBNESS: 1
EYES NEGATIVE: 1

## 2024-12-02 NOTE — PROGRESS NOTES
Subjective   Patient ID: Nesha Mcleod is a 69 y.o. female presents for chronic pain management via phone call due to inclement weather.  Resides in a senior apartment and is snowed in.       Nesha follows up for interval reevaluation for chronic low back pain from lumbar post laminectomy syndrome and mid back pain from thoracic disc herniations.  Right hip pain from arthritis.  Does follow rheumatology and receives DMARD infusions.    Hydromorphone 2 MG up to 3 times daily as needed, gabapentin 300 MG to a total of twice daily due to renal impairment, once a day and Flexeril 10 MG twice a day as she needs reduces pain and improves function up to 80%. Average pain score is 5 out of 10 with medication.  Better able to manage ADLs with improved function since injection therapy in combination with medicinal therapy. Has an average quality family and social life with current condition and treatment.      Toxicology consistent May 15, 2024.  Annual controlled substance agreement and opioid risk tool are completed and scanned into the chart May 15, 2024.     Serena is with laminectomy changes at T11, L2, L3 and L5 levels is with a grade 1 anterolisthesis of L5 on S1. At the L2-L3 level laminectomy changes are also noted with scoliosis and disc degeneration. Is also with moderate facet joint arthropathy. The combination of these changes causes moderate to severe central canal stenosis, severe left lateral recess and moderate right lateral recess stenosis with severe left neural foramina and moderate to severe right neuroforaminal narrowing. MRI was from April 20, 2021 ordered by primary care doctor from [facility].    Has left leg giving out from left knee pain.  History of left and right total knee replacements.  Left knee with swelling.  Scheduled to see orthopedic surgeon January 14, 2025 for this condition.  Offered physical therapy for her right hip pain from arthritis in the form of aqua therapy or land therapy.  She  declines due to transportation and cost.  Continues to utilize rollator walker for ambulation.        For continuity:   Given the patient's report of reduced pain and improved functional ability without adverse effects, it is reasonable to treat with narcotic medications. The terms of the opioid agreement as well as the potential risks and adverse effects of the patient's medication regimen were discussed in detail. This includes if applicable due to dosage of medication permission to discuss and coordinate care with other treatment providers relevant to the patients condition. The patient verbalized understanding.     Risks and side effects of chronic opioid therapy including but not limited to tolerance, dependence, constipation, hyperalgesia, cognitive side effects, addiction and possible death due to overuse and or misuse were discussed. I also discussed that such medications when co-administered with other sedative agents including but not limited to alcohol, benzodiazepines, sedative hypnotics and illegal drugs could pose life threatening consequences including death. I also explained the impact that the administration of such medication has on a patient with obstructive sleep apnea and continued recommendations for use of apnea devices if ordered are prescribed by other physicians. In order to effectively and safely treat the pain, I also emphasized the importance of compliance with the treatment plan, as well as compliance with the terms of the opioid agreement, which was reviewed in detail. I explained the importance of being responsible with the medications and to take these only as prescribed, never in excess and never for reasons other than pain reduction. The patient was counseled on keeping the medications safe and locked away from children and other adults as well as disposal methods and options. The patient understood the risks and instructions.     I also discussed with the patient in detail that  based on the clinical response to the opioid medications and improvements of activities of daily living, sleep, and work performance in light of compliance with the treatment plan we can continue this form of therapy for the above chronic pain. The goal and rationale used for current treatment with chronic opioid medication is to control the pain and alleviate disability induced by the chronic pain condition noted above after failures of other non-opioid and nonpharmacological modalities to treat the chronic pain and the symptoms associated with have failed. The patient understood the goals in terms of the above treatment plan and had no further questions prior to leaving the office today.     Of note, the above-mentioned diagnoses/conditions and expected fluctuating nature of pain, and pain characteristic changes may lead to prolonged functional impairment requiring frequent and multiple reassessments with continued high level medical decision making. As noted, medication and medication management may require opiate therapy in excess of a routine less than 30 day medication requirement. The patient may require daily opiate therapy necessitating month-long prescription medication as noted above in order to perform activities of daily living and achieve acceptable quality of life with respect to their chronic pain condition for the foreseeable future. We monitor our patient's carefully through drug monitoring, medication counts, urine drug testing specific to their medication as well as a myriad of other substances and with frequent follow-ups with interval reassement of the chronic pain condition, its pathophysiology and prognosis.     The level of clinical decision making at this office visit is high due to high risks and complications including mortality and morbidity related to acute and chronic pain with respects to life, bodily function, and treatment. Risks and clinical decisions with respect to under  treatment, failure to maintain adequate treatment, and/or overtreatment complications and outcomes were discussed with the patient with respect to their chronic pain conditions, interventional therapies, as well as the use of various medications including possible controlled/dangerous medications. The amount and complexity of reviewed data at this in subsequent office visits is high given patient's fluctuating clinical presentation, laboratory and radiographic reports, prescription monitoring program data, and medication history as well as other relevant data as noted above. Pertinent negatives and positives data was used in consideration for the above-mentioned high complexity.      Given the patient's total MED, general use of daily opiates, or other coadministered medications in various classes the patient was offered a prescription for Narcan. I instructed the patient that it is important that patient fill this medication in order to demonstrate understanding of the gravity of possible side effects including respiratory depression and risk of overdose of this opiate load or medication combination. As such patient will be required to bring Narcan prescription to follow-up appointments as part of compliance with continued opiate care.     With respect to opiate induced constipation I discussed multiple ways to combat this problem including staying hydrated and taking over-the-counter medications such as Dulcolax, Miralax and Senna. If these treatments are not effective we could consider such medications as Amitiza, Linzess and Movantik.     Disclaimer: This note was transcribed using an audio transcription device. As such, minor errors may be present with regard to spelling, punctuation, and inadvertent word insertion. Please disregard such errors.    Narrative & Impression   Interpreted By:  Jayy Marx,   STUDY:  XR KNEE LEFT 4+ VIEWS; ;  9/12/2024 11:21 am      INDICATION:  Signs/Symptoms:Fall and pain.       ,M25.562 Pain in left knee      COMPARISON:  10/10/2019      ACCESSION NUMBER(S):  [id number]      ORDERING CLINICIAN:  DORIS SANTOS      FINDINGS:  Left knee, four views      Total knee arthroplasty in place. There is no periprosthetic fracture  or lucency. There is no dislocation. There is a moderate-sized  effusion      IMPRESSION:  No hardware failure about the left total knee arthroplasty      MACRO:  None      Signed by: Jayy Marx 9/13/2024 6:39 PM  Dictation workstation:   [id number]       Narrative & Impression   Interpreted By:  Schoenberger, Joseph,   STUDY:  XR HIP RIGHT WITH PELVIS WHEN PERFORMED 2 OR 3 VIEWS; ;  3/12/2024  8:47 am      INDICATION:  Signs/Symptoms:pain.      COMPARISON:  None.      ACCESSION NUMBER(S):  [id number]      ORDERING CLINICIAN:  DORIS SANTOS      FINDINGS:  There are calcifications of the superolateral acetabular labrum. Mild  degenerative changes in the hip with some narrowing and mild  spurring. No fracture or dislocation.      IMPRESSION:  No definite acute findings. See discussion above.          MACRO:  None      Signed by: Joseph Schoenberger 3/13/2024 9:09 AM  Dictation workstation:   [id number]     Narrative & Impression   Interpreted By:  Kathie Montaño,   STUDY:  XR SHOULDER LEFT 2+ VIEWS;  5/6/2024 8:50 am      INDICATION:  Signs/Symptoms:pain and swelling.      COMPARISON:  07/14/2021      ACCESSION NUMBER(S):  [id number]      ORDERING CLINICIAN:  DORIS SANTOS      FINDINGS:  Three views of the left shoulder obtained.      Artifact from overlying clothing noted. Reverse shoulder arthroplasty  in stable alignment. Likely unchanged mild separation between the  articular surface of the proximal humeral component and proximal  humerus and mild cortical irregularity adjacent to the stem of the  humeral prosthesis allowing for differences in projection. Smooth  irregular ununited fracture of the scapula and smooth  calcifications/ossifications adjacent to  the acromion similar to the  prior exam.      IMPRESSION:  Status post reverse shoulder arthroplasty with intact hardware and  mild productive changes similar to 07/14/2021. Remote ununited  scapular fracture again noted.      MACRO:  None.      Signed by: Kathie Montaño 5/9/2024 8:33 AM  Dictation workstation:   [id number]   Narrative & Impression  Interpreted By:  Kathie Montaño,   STUDY:  XR ELBOW LEFT 3+ VIEWS;  5/6/2024 8:50 am      INDICATION:  Signs/Symptoms:pain and swelling.      COMPARISON:  06/28/2008      ACCESSION NUMBER(S):  [id number]      ORDERING CLINICIAN:  DORIS SANTOS      FINDINGS:  Five views of the left elbow obtained.      No acute fracture or osseous displacement. Radial humeral joint space  narrowing. Radial humeral and radioulnar joint spurring. Faint  periarticular calcifications. Mild spurring/productive change along  the lateral distal humeral epicondyle. Small joint effusion. No  erosive changes.      IMPRESSION:  Moderate productive/osteoarthritic changes of the left elbow with  small joint effusion.      MACRO:  None.      Signed by: Kathie Montaño 5/9/2024 8:29 AM  Dictation workstation:   [id number]      Narrative & Impression   PROCEDURE:         HIP RT 2 VIEW W OR WO AP PELVIS - IXR  0057  REASON FOR EXAM: hip pain     RESULT: MRN: [id number]  Patient Name: LIZZIE PHILLIPS     STUDY:  HIP RT 2 VIEW W OR WO AP PELVIS 9/5/2023 10:55 am     INDICATION:  Right hip pain with history of fall 1 week ago     COMPARISON:  None available.     ACCESSION NUMBER(S):  [id number]     ORDERING CLINICIAN:  KATHY LOBO     TECHNIQUE:  AP view of the pelvis with AP and lateral views of the right hip.     FINDINGS:  No fracture or dislocation of the right hip is demonstrated. There is no  acute bony abnormality of the pelvis. There is chondrocalcinosis of the hips  bilaterally. Arterial calcification is also present. There are pelvic  phleboliths identified.     IMPRESSION:  No fracture or dislocation of  the right hip with no pelvic fracture  identified.     Chondrocalcinosis of the hips bilaterally.  Dictation workstation:   [id number]     Original Interpreting Physician:   LATOSHA FRANCOIS M.D.  Original Transcribed by/Date: MMSRINIVASA Sep  5 2023  9:48A  Original Electronically Signed by/Date: LATOSHA FRANCOIS M.D. Sep  5 2023 11:03A     Addendum Interpreting Physician:  Addendum Transcribed by/Date: NO ADDENDUM  Addendum Electronically Signed by/Date:     Narrative & Impression   PROCEDURE:         HIP RT WO CONTRAST - ICT  3170  REASON FOR EXAM: Hip trauma, fracture suspected, xray done     RESULT: MRN: [id number]  Patient Name: LIZZIE PHILLIPS     STUDY:  HIP RT WO CONTRAST 9/5/2023 11:50 am     INDICATION:  Pain and fall     COMPARISON:  None available.     ACCESSION NUMBER(S):  [id number]     ORDERING CLINICIAN:  PATTI WASHINGTON     TECHNIQUE:  Unenhanced axial images through the right hip are performed with sagittal  and coronal reformations completed by the technologist at the acquisition  scanner.     FINDINGS:  There is no fracture or dislocation of the right hip. No fracture of the  right hemipelvis is observed. There is chondrocalcinosis of the right hip as  well as osteoarthritis. Small osteophytes project from the right femoral  head.     No intramuscular hematoma is seen.     There is vacuum disc at the L4-5 and L5-S1 levels with a grade 1  spondylolisthesis of L5 on S1 with unilateral spondylolysis on the left at  L5 identified. There is also considerable facet arthrosis bilaterally at  L4-5 and L5-S1 levels.     The urinary bladder is moderately distended.        IMPRESSION:  No fracture or dislocation of the right hip with no fracture of right  hemipelvis identified.     Grade 1 spondylolisthesis of L5 on S1 with pars defect on the left at L5.     Moderate distention of urinary bladder.     Mild osteoarthritis of the right hip with chondrocalcinosis.  Dictation workstation:   [id number]  This exam is available in DICOM  format to non-affiliated healthcare  facilities on a secure media free searchable basis with prior patient  authorization.  The patient exposure is reported to a radiation dose index  registry.  All CT examinations are performed with one or more of the  following dose reduction techniques: Automated Exposure Control, Adjustment  of mA and/or KV according to patient size, or use of iterative  reconstruction techniques.     Original Interpreting Physician:   LATOSHA FRANCOIS M.D.  Original Transcribed by/Date: MMODAL Sep  5 2023 11:13A  Original Electronically Signed by/Date: LATOSHA FRANCOIS M.D. Sep  5 2023 12:04P     Narrative & Impression   PROCEDURE:         SPINE LUMBAR WO CONTRAST - CMR  2010  REASON FOR EXAM: SPINAL STENOSIS OF LUMBAR REGION WITH NEUROGENIC  CLAUDICATION     RESULT: MRN: [id number]  Patient Name: LIZZIE PHILLIPS     STUDY:  SPINE LUMBAR WO CONTRAST; 5/23/2023 1:16 pm     INDICATION:  SPINAL STENOSIS OF LUMBAR REGION WITH NEUROGENIC CLAUDICATION. 68-year-old  woman with chronic lower back pain and bilateral leg pain     COMPARISON:  Lumbar spine MRI 04/20/2021.     ACCESSION NUMBER(S):  [id number]     ORDERING CLINICIAN:  APRIL FLORES     TECHNIQUE:  Multiecho and multiplanar imaging of the lumbar spine was performed without  IV contrast.     FINDINGS:  Redemonstrated levoscoliosis of the lumbar spine. Interbody fusion of the L3  and L4 vertebral bodies again noted with laminectomy changes at L2, L3, and  L5 again seen.     Moderate to severe disc space narrowing is again noted at L1-L2, and L2-L3.        Alignment: There is grade 1 anterolisthesis of L5 on S1. There is grade 1  retrolisthesis of L2 on L3.        Bone marrow signal: Redemonstrated heterogeneous bone marrow signal, which  could be related to marrow reconversion process. Redemonstrated slightly  increased STIR signal within the L2 vertebral body, which may reflect  reactive bone marrow edema in the setting of degenerative change.        Conus: The  conus medullaris terminates at the level of L1.        Paraspinal Soft tissues: Postsurgical changes in the posterior lumbar soft  tissues again noted.        Imaged Abdomen: Millimetric T2 hyperintense, T1 hypointense right and left  renal cysts are seen.        T12-L1: Moderate left paracentral disc bulge again noted causing effacement  of the left side of the ventral thecal sac. No significant spinal canal or  neural foraminal narrowing.        L1-2: Mild disc bulge with facet arthropathy is again noted, resulting in  mild spinal canal narrowing. There is also mild bilateral neural foraminal  narrowing, more pronounced on the right.        L2-3: Laminectomy changes again noted. There is broad-based disc osteophyte  with facet hypertrophy resulting in severe spinal canal stenosis. Severe  bilateral neural foraminal narrowing is identified with likely contact and  impingement upon the exiting bilateral nerve roots.        L3-4: Redemonstrated interbody fusion/ankylosis of the L3-L4 vertebral  bodies with laminectomy changes noted. Mild facet joint arthropathy. No  significant spinal canal narrowing. Mild right neural foraminal narrowing.        L4-5: Spondylolisthesis with broad-based disc protrusion, facet hypertrophy,  and mild ligamentum flavum hypertrophy results in mild spinal canal  narrowing. There is also moderate right and mild-to-moderate left neural  foraminal narrowing.        L5-S1: Laminectomy changes again noted. No sign of spinal canal narrowing.  There is moderate facet joint hypertrophy. There is mild-to-moderate left  and mild right neural foraminal narrowing.     IMPRESSION:  1. Redemonstrated postsurgical and degenerative changes of the lumbar spine,  with multilevel neural foraminal narrowing and spinal canal narrowing.     2. At L2-L3, there is severe bilateral neural foraminal narrowing and severe  spinal canal stenosis.     3. Please see above for full description of findings at each  individual disc  level.     4. Redemonstrated heterogeneous bone marrow signal, as can be seen with  marrow reconversion process. Please correlate with appropriate clinical and  laboratory data.        Dictation workstation:   [id number]     Original Interpreting Physician:   SHAQ SOLO MD  Original Transcribed by/Date: CEDRIC May 23 2023 12:15P  Original Electronically Signed by/Date: SHAQ SOLO MD May 23 2023  2:52P            Test Name Result Flag Reference   Xray Cervical Spine 2 or 3 View (Report)       FINAL REPORT  Interpreted by: DICKSON MCNEIL   08/03/22 11:15  MRN: [id number]  Patient Name: LIZZIE PHILLIPS     STUDY:  SPINE, CERVICAL, 2 OR 3 VIEWS; ; 8/2/2022 8:15 am     INDICATION:  CERVICAL PAIN M54.2: Cervical pain.     COMPARISON:  06/27/2022     ACCESSION NUMBER(S):  [id number]     ORDERING CLINICIAN:  LEONILA WHARTON     FINDINGS:  Stable right central venous catheter. Postoperative changes C5-C6  ACDF. Atlantoaxial interval is maintained. Irregularity along the C2  odontoid base compatible better evaluated on the CT from 06/27/2022.  No fracture or traumatic malalignment. Mild multilevel degenerative  changes of the cervical spine. Precervical soft tissue planes are  maintained. Imaged lung fields are clear.     IMPRESSION:  No acute fracture or traumatic malalignment.  Stable CT odontoid irregularity likely secondary to history of  rheumatoid arthritis.  Stable postoperative changes C5-C6 ACDF.  Mild multilevel degenerative changes of the cervical spine.        Electronically signed by: EWA  08/03/22 11:15      Xray Cervical Spine 2 or 3 View 27Jun2022 11:35PM Massimo Gee      Test Name Result Flag Reference   Xray Cervical Spine 2 or 3 View (Report)       FINAL REPORT  Interpreted by: LOBITO PIKE   06/28/22 00:04  MRN: [id number]  Patient Name: LIZZIE PHILLIPS     STUDY:  Cervical spine x-rays     INDICATION:  fx     COMPARISON:  X-ray cervical spine 08/24/2021  MRI  cervical spine 05/28/2020     ACCESSION NUMBER(S):  [id number]     ORDERING CLINICIAN:  GAYE SKELTON     TECHNIQUE:  Four views of the cervical spine     FINDINGS:  Limited exam given nonvisualization of the lower cervical spine on  lateral view.     Alignment: Minimal anterior subluxation of C3-4, degenerative.     Bones: Minimal multilevel loss of vertebral body height. ACDF of  C5-C6.     Disc spaces: Moderate spondylosis.     Soft tissues: Right-sided chest port. Shoulder arthroplasty.     IMPRESSION:  1. Limited exam.  2. No acute finding.     Electronically signed by: SHAHZAD  06/28/22 00:04      Xray Knee 1 or 2 View [id number] 03:30PM Toussaint, Karl      Test Name Result Flag Reference   Xray Knee 1 or 2 View (Report)       FINAL REPORT  Interpreted by: BRITTANY FAYE HANAUER, MD   02/14/22 15:52  MRN: [id number]  Patient Name: LIZZIE PHILLIPS     STUDY:  KNEE; 1 OR 2 VIEWS; 2/14/2022 3:30 pm     INDICATION:  Status post total knee replacement in pacu .     COMPARISON:  None     ACCESSION NUMBER(S):  [id number]     ORDERING CLINICIAN:  KARL TOUSSAINT     TECHNIQUE:  AP and lateral portable postoperative views of the right knee were  obtained.     FINDINGS:  Immediately status post right knee arthroplasty. Femoral and tibial  components appear well seated and in good alignment. Patellar  component is radiolucent. There is postsurgical soft tissue air and  also edema anteriorly in the soft tissues and in the suprapatellar  bursa. No lytic or blastic destructive bone lesion. No acute  fracture or dislocation.     IMPRESSION:  Immediately status post right knee arthroplasty. Postsurgical changes  as described. No acute fracture.     Electronically signed by: BRITTANY FAYE  02/14/22 15:52      MRI L Spine without Contrast 20Apr2021 09:05AM Non Ambulatory, Provider   Ordering Provider: MARIAMA LIN [id number]      Test Name Result Flag Reference   MRI L Spine without Contrast (Report)       Interpreted by: MARCIA  ADALGISA PADILLA  04/20/21 10:12  MRN: [id number]  Patient Name: LIZZIE PHILLIPS     STUDY:  MRI L-SPINE WO; ; 4/20/2021 9:05 am     INDICATION:  M48.062 Spinal stenosis, lumbar region with neurogenic claudication.  M48.062 Spinal stenosis, lumbar region with neurogenic claudication.     COMPARISON:  12/16/2020, 04/16/2020     ACCESSION NUMBER(S):  [id number]     ORDERING CLINICIAN:  MARIAMA LIN     TECHNIQUE:  Multiplanar, multisequence imaging of the lumbar spine was performed  without intravenous contrast administration.     FINDINGS:  Moderate levoscoliosis noted. Conus medullaris terminates at the  level of L1 vertebral body. Visualized portions of the conus and the  spinal cord shows normal signal intensity and caliber.     Heterogeneous bone marrow signal intensity noted, likely related to  osteopenia/osteoporosis. Ankylosis of the L3 and L4 vertebral bodies  noted. Laminectomy changes at T11, L2, L3 and L5 levels are again  noted. Grade 1 anterolisthesis of L5 on S1, trace retrolisthesis of  L1 on L2.     Again note is made of the prompting of the nerve roots at L4-L5 level.     At T11-T12 there is moderate left paracentral disc bulge noted  causing moderate indentation of the left side of the thecal sac with  mild flattening and posterior displacement of the cord. No cord  edema. Laminectomy changes noted. Asymmetric right-sided facet joint  arthropathy, ligamentum flavum hypertrophy indenting the  posterolateral aspect of the thecal sac, there is moderate central  spinal canal stenosis. Mild encroachment of the bilateral neural  foramina.     At L1-L2 mild disc bulge, facet joint arthropathy noted causing  minimal encroachment of the spinal canal. Mild encroachment of the  bilateral neural foramina noted.     At L1-L2 scoliosis, broad-based disc osteophyte, mild facet joint  arthropathy in combination noted causing mild-to-moderate central  spinal canal stenosis, and right neural foramina narrowing.  Mild  encroachment of the left neural foramina.     At L2-L3 laminectomy changes noted, scoliosis, broad-based disc  osteophyte, moderate facet joint arthropathy in combination noted  causing moderate to severe central spinal canal, severe left lateral  recess, moderate right lateral recess stenosis, severe left neural  foramina, moderate to severe right neural foramina narrowing.     At L3-L4 there is ankylosis of the L3-L4 vertebral bodies noted.  Laminectomy changes noted. Mild facet joint arthropathy. No  significant central spinal canal or neural foramina stenosis.     At L4-L5 broad-based central disc protrusion noted, moderate facet  joint arthropathy noted causing mild encroachment of the spinal  canal, mild right neuroforaminal and mild-to-moderate left neural  narrowing.     At L5-S1 again note is made of the laminectomy changes. Moderate  facet joint arthropathy, asymmetric on the right side indenting the  posterolateral aspect of the right side of the thecal sac. No  significant central spinal canal stenosis, mild encroachment of the  bilateral neural foramina, left greater than the right.     IMPRESSION:  Postoperative, multilevel degenerative spinal canal or neural  foramina stenosis as described above, predominantly in the lower  thoracic and upper lumbar spine, worse changes are noted at L2-L3.  There is no significant change from prior.        Electronically signed by: MARCIA PADILLA  04/20/21 10:12      MRI Cervical without Contrast [id number] 11:56AM Regine Le   [Jun 2, 2020 3:40PM Rosalio Webb]  AMA Intake Activity Log Entry by Rosalio Webb ([email or web address]) on 6/2/2020 3:37 PM  Status Change: To Confirmed - N/A   [Jun 2, 2020 3:40PM Rosalio Webb]  AMA Intake updated by Rosalio Webb ([email or web address]) on 6/2/2020 3:37 PM  New Recipient: Regine Le  New Appointment Date: May 28 2020 11:56AM   [May 27, 2020 12:33PM Regine Le]  Reason: Unspecified for MRI Cervical without Contrast      Test Name  Result Flag Reference   MRI Cervical without Contrast (Report)       Interpreted by: IVETTE RAPHAEL  05/28/20 13:01  MRN: [id number]  Patient Name: LIZZIE PHILLIPS     STUDY:  MRI CERVICAL WO; 5/28/2020 11:56 am     INDICATION:  M96.1 Postlaminectomy syndrome, not elsewhere classified  M54.12  Radiculopathy, cervical region   neck pain.     COMPARISON:  March 4, 2015     ACCESSION NUMBER(S):  [id number]     ORDERING CLINICIAN:  ANH PENA     TECHNIQUE:  Sagittal T1, T2, STIR, axial T1 and axial T2 weighted images were  acquired through the cervical spine.     FINDINGS:  Alignment: There is new, grade 1 anterolisthesis of C3 on C4 and to a  lesser extent of C2 on C3. There is subtle, grade 1 anterolisthesis  of C7 on T1, T1 on T2, and T2 on T3.     Vertebrae/Intervertebral Discs: There is artifact due to anterior  fusion hardware at C5 and C6. The marrow signal is otherwise  inhomogeneous throughout on T1 and T2 weighted imaging and there are  multilevel degenerative endplate signal changes. There is new  ill-defined abnormal diminished signal on T1 and increased signal on  STIR imaging within the odontoid process and extending into the  superior aspect of the vertebral body. There are degenerative changes  of the craniocervical articulations and at the atlantodental interval.     Cord: The cervical cord is degraded by artifact particularly from  C4-5 through C6-7.     C1-C2: There is ligamentous thickening surrounding the odontoid.  There is no central canal stenosis.     C2-C3: There is no posterior disc contour abnormality. Left greater  than right degenerative facet changes do not produce significant  central canal or neural foraminal stenosis.     C3-C4: There is diffuse disc bulging, more pronounced than seen  previously. There has been marked interval progression of  degenerative facet arthropathy on the left. Abnormal signal is noted  within the left facet on STIR and T2 weighted imaging. There is  also  left greater than right uncovertebral joint hypertrophy. Overall  there is severe left and mild right-sided neural foraminal stenosis.  There is no significant narrowing of the central canal.     C4-C5: Evaluation is limited due to artifact from the surgical  hardware. Left greater than right degenerative facet arthropathy is  suspected. There is a posterior disc/osteophyte complex producing at  least partial effacement of the ventral subarachnoid space. There is  likely mild central canal stenosis and left greater than right neural  foraminal narrowing.     C5-C6: Evaluation is markedly limited due to artifact from the  surgical hardware.     C6-C7: Evaluation is markedly limited due to artifact from the  surgical hardware. There does appear to be mild disc bulging and  endplate spurring without appreciable central canal stenosis.     C7-T1: There is minimal disc bulging without central canal stenosis.  Facet and uncovertebral joint hypertrophy produce mild neural  foraminal stenosis.     T1-2: There is mild narrowing of the central canal due to disc  protrusion.     The sagittal images demonstrate disc protrusions at T2-3 and T3-4  with partial effacement of the ventral subarachnoid space.     The prevertebral and posterior paraspinous soft tissues are within  normal limits.     IMPRESSION:  1. Postoperative changes at C5 and C6. Associated artifact limits the  examination.  2. New abnormal signal within the odontoid process extending into the  superior aspect of the C2 vertebral body is nonspecific. A subacute  fracture could give this appearance. Alternatively, the signal  changes could be related to an erosive arthritis. CT of the cervical  spine may be of benefit for additional characterization.  3. Grade 1 anterolisthesis of C3 on C4 is new from 2015. Abnormal  signal within the left C3-4 facet is thought to most likely be  degenerative in etiology. An infectious or inflammatory arthritis  could also  give this appearance, however.  4. Multilevel degenerative changes of the cervical and upper thoracic  spine.     An Orange alert message was sent to the referring physician Dr. ANH PENA through the [employer] system at 1:01 pm on 5/28/2020 by  Dr.Jennifer Muñoz     Electronically signed by: IVETTE MUÑOZ  05/28/20 13:01       Review of Systems   Constitutional: Negative.    HENT: Negative.     Eyes: Negative.    Respiratory: Negative.     Cardiovascular: Negative.    Gastrointestinal: Negative.    Endocrine: Negative.    Genitourinary: Negative.    Musculoskeletal:  Positive for arthralgias, back pain, gait problem, joint swelling, myalgias and neck pain. Negative for neck stiffness.   Skin: Negative.    Allergic/Immunologic: Negative.    Neurological:  Positive for weakness and numbness. Negative for dizziness, tremors, seizures, syncope, facial asymmetry, speech difficulty, light-headedness and headaches.   Hematological: Negative.    Psychiatric/Behavioral: Negative.         Nesha was seen today for med refill and pain.  Diagnoses and all orders for this visit:  Postlaminectomy syndrome, lumbar region (Primary)  -     cyclobenzaprine (Flexeril) 10 mg tablet; Take 1 tablet (10 mg) by mouth 2 times a day as needed for muscle spasms.  -     gabapentin (Neurontin) 300 mg capsule; Take 1 capsule (300 mg) by mouth 2 times a day. Do not fill before December 8, 2024.  -     HYDROmorphone (Dilaudid) 2 mg tablet; Take 1 tablet (2 mg) by mouth 3 times a day as needed for moderate pain (4 - 6) for up to 28 days.  -     HYDROmorphone (Dilaudid) 2 mg tablet; Take 1 tablet (2 mg) by mouth 3 times a day as needed for moderate pain (4 - 6) for up to 28 days. Do not fill before December 30, 2024.  -     HYDROmorphone (Dilaudid) 2 mg tablet; Take 1 tablet (2 mg) by mouth 3 times a day as needed for moderate pain (4 - 6) for up to 28 days. Do not fill before January 27, 2025.  Neurogenic claudication due to lumbar spinal  stenosis  Cervical myofascial pain syndrome  -     cyclobenzaprine (Flexeril) 10 mg tablet; Take 1 tablet (10 mg) by mouth 2 times a day as needed for muscle spasms.  -     gabapentin (Neurontin) 300 mg capsule; Take 1 capsule (300 mg) by mouth 2 times a day. Do not fill before December 8, 2024.  -     HYDROmorphone (Dilaudid) 2 mg tablet; Take 1 tablet (2 mg) by mouth 3 times a day as needed for moderate pain (4 - 6) for up to 28 days.  -     HYDROmorphone (Dilaudid) 2 mg tablet; Take 1 tablet (2 mg) by mouth 3 times a day as needed for moderate pain (4 - 6) for up to 28 days. Do not fill before December 30, 2024.  -     HYDROmorphone (Dilaudid) 2 mg tablet; Take 1 tablet (2 mg) by mouth 3 times a day as needed for moderate pain (4 - 6) for up to 28 days. Do not fill before January 27, 2025.  Psoriatic arthritis (Multi)  -     gabapentin (Neurontin) 300 mg capsule; Take 1 capsule (300 mg) by mouth 2 times a day. Do not fill before December 8, 2024.  -     HYDROmorphone (Dilaudid) 2 mg tablet; Take 1 tablet (2 mg) by mouth 3 times a day as needed for moderate pain (4 - 6) for up to 28 days.  -     HYDROmorphone (Dilaudid) 2 mg tablet; Take 1 tablet (2 mg) by mouth 3 times a day as needed for moderate pain (4 - 6) for up to 28 days. Do not fill before December 30, 2024.  -     HYDROmorphone (Dilaudid) 2 mg tablet; Take 1 tablet (2 mg) by mouth 3 times a day as needed for moderate pain (4 - 6) for up to 28 days. Do not fill before January 27, 2025.  Arthritis        Follow-up 12 weeks.    Reviewed and approved by JURGEN BEAVERS on 12/2/24 at 12:27 PM.

## 2024-12-02 NOTE — TELEPHONE ENCOUNTER
Patient said due to the weather she doesn't think she can come in at all this week. Wants to know if she can due a virtual today

## 2024-12-09 ENCOUNTER — TELEPHONE (OUTPATIENT)
Dept: PRIMARY CARE | Facility: CLINIC | Age: 69
End: 2024-12-09
Payer: MEDICARE

## 2024-12-09 NOTE — TELEPHONE ENCOUNTER
Patient calling to report over the last week her anytime her BS drops below 129 she breaks out in a sweat.  Patient also reports anytime BS over 179 she is breaking out in sweats.  Patient reports she's taking medications as ordered for DM.  Reports her left upper arm is still swollen from the Spider bite and over the last week it has had blood and fluid leaking from it.  Please Advise.

## 2024-12-17 ENCOUNTER — INFUSION (OUTPATIENT)
Dept: INFUSION THERAPY | Facility: CLINIC | Age: 69
End: 2024-12-17
Payer: MEDICARE

## 2024-12-17 ENCOUNTER — APPOINTMENT (OUTPATIENT)
Dept: INFUSION THERAPY | Facility: CLINIC | Age: 69
End: 2024-12-17
Payer: MEDICARE

## 2024-12-17 VITALS
SYSTOLIC BLOOD PRESSURE: 152 MMHG | WEIGHT: 170.3 LBS | TEMPERATURE: 97.5 F | BODY MASS INDEX: 35.59 KG/M2 | RESPIRATION RATE: 18 BRPM | DIASTOLIC BLOOD PRESSURE: 90 MMHG | HEART RATE: 96 BPM | OXYGEN SATURATION: 99 %

## 2024-12-17 DIAGNOSIS — L40.50 PSORIATIC ARTHRITIS (MULTI): ICD-10-CM

## 2024-12-17 PROCEDURE — 96365 THER/PROPH/DIAG IV INF INIT: CPT | Performed by: NURSE PRACTITIONER

## 2024-12-17 RX ORDER — EPINEPHRINE 0.3 MG/.3ML
0.3 INJECTION SUBCUTANEOUS EVERY 5 MIN PRN
OUTPATIENT
Start: 2025-02-11

## 2024-12-17 RX ORDER — HEPARIN 100 UNIT/ML
500 SYRINGE INTRAVENOUS AS NEEDED
OUTPATIENT
Start: 2024-12-17

## 2024-12-17 RX ORDER — ALBUTEROL SULFATE 0.83 MG/ML
3 SOLUTION RESPIRATORY (INHALATION) AS NEEDED
OUTPATIENT
Start: 2025-02-11

## 2024-12-17 RX ORDER — HEPARIN SODIUM,PORCINE/PF 10 UNIT/ML
50 SYRINGE (ML) INTRAVENOUS AS NEEDED
OUTPATIENT
Start: 2024-12-17

## 2024-12-17 RX ORDER — HEPARIN 100 UNIT/ML
500 SYRINGE INTRAVENOUS AS NEEDED
Status: DISCONTINUED | OUTPATIENT
Start: 2024-12-17 | End: 2024-12-17 | Stop reason: HOSPADM

## 2024-12-17 RX ORDER — DIPHENHYDRAMINE HYDROCHLORIDE 50 MG/ML
50 INJECTION INTRAMUSCULAR; INTRAVENOUS AS NEEDED
OUTPATIENT
Start: 2025-02-11

## 2024-12-17 RX ORDER — FAMOTIDINE 10 MG/ML
20 INJECTION INTRAVENOUS ONCE AS NEEDED
OUTPATIENT
Start: 2025-02-11

## 2024-12-17 ASSESSMENT — ENCOUNTER SYMPTOMS
UNEXPECTED WEIGHT CHANGE: 0
DIARRHEA: 0
DYSURIA: 0
BLOOD IN STOOL: 0
WHEEZING: 0
APPETITE CHANGE: 0
CHILLS: 0
FREQUENCY: 0
ARTHRALGIAS: 1
EYE PROBLEMS: 0
COUGH: 0
NAUSEA: 0
LIGHT-HEADEDNESS: 0
MYALGIAS: 1
EXTREMITY WEAKNESS: 0
SORE THROAT: 0
FEVER: 0
CONSTIPATION: 0
HEMATURIA: 0
PALPITATIONS: 0
HEADACHES: 0
WOUND: 0
ABDOMINAL PAIN: 0
VOICE CHANGE: 0
VOMITING: 0
LEG SWELLING: 0
TROUBLE SWALLOWING: 0
FATIGUE: 0
SHORTNESS OF BREATH: 0
NUMBNESS: 0
DIZZINESS: 0

## 2024-12-17 ASSESSMENT — PAIN SCALES - GENERAL: PAINLEVEL_OUTOF10: 8

## 2024-12-17 NOTE — SIGNIFICANT EVENT
Port accessed easily with #22 small and positive blood return noted.  Port flushes easily.  Occlussive dressing applied.

## 2024-12-17 NOTE — PATIENT INSTRUCTIONS
Today :We administered golimumab (Simponi Aria) 137.5 mg in sodium chloride 0.9% 100 mL IV.     For:   1. Psoriatic arthritis (Multi)         Your next appointment is due in:  56 days     Please read the  Medication Guide that was given to you and reviewed during todays visit.     (Tell all doctors including dentists that you are taking this medication)     Go to the emergency room or call 911 if:  -You have signs of allergic reaction:   -Rash, hives, itching.   -Swollen, blistered, peeling skin.   -Swelling of face, lips, mouth, tongue or throat.   -Tightness of chest, trouble breathing, swallowing or talking     Call your doctor:  - If IV / injection site gets red, warm, swollen, itchy or leaks fluid or pus.     (Leave dressing on your IV site for at least 2 hours and keep area clean and dry  - If you get sick or have symptoms of infection or are not feeling well for any reason.    (Wash your hands often, stay away from people who are sick)  - If you have side effects from your medication that do not go away or are bothersome.     (Refer to the teaching your nurse gave you for side effects to call your doctor about)    - Common side effects may include:  stuffy nose, headache, feeling tired, muscle aches, upset stomach  - Before receiving any vaccines     - Call [facility] at   If:  - You get sick, are on antibiotics, have had a recent vaccine, have surgery or dental work and your doctor wants your visit rescheduled.  - You need to cancel and reschedule your visit for any reason. Call at least 2 days before your visit if you need to cancel.   - Your insurance changes before your next visit.    (We will need to get approval from your new insurance. This can take up to two weeks.)     [facility] is opened Monday thru Friday. We are closed on weekends and holidays.   Voice mail will take your call if the center is closed. If you leave a message please allow 24 hours for a  call back during weekdays. If you leave a message on a weekend/holiday, we will call you back the next business day.    A pharmacist is available Monday - Friday from 8:30AM to 3:30PM to help answer any questions you may have about your prescriptions(s). Please call pharmacy at:    [facility]: [phone]  [facility]: [phone]  [facility]: [phone]            Today :We administered golimumab (Simponi Aria) 137.5 mg in sodium chloride 0.9% 100 mL IV and heparin flush.   For:   1. Psoriatic arthritis (Multi)         Your next appointment is due in:  56 days      Please read the  Medication Guide that was given to you and reviewed during todays visit.     (Tell all doctors including dentists that you are taking this medication)     Go to the emergency room or call 911 if:  -You have signs of allergic reaction:   -Rash, hives, itching.   -Swollen, blistered, peeling skin.   -Swelling of face, lips, mouth, tongue or throat.   -Tightness of chest, trouble breathing, swallowing or talking     Call your doctor:  - If IV / injection site gets red, warm, swollen, itchy or leaks fluid or pus.     (Leave dressing on your IV site for at least 2 hours and keep area clean and dry  - If you get sick or have symptoms of infection or are not feeling well for any reason.    (Wash your hands often, stay away from people who are sick)  - If you have side effects from your medication that do not go away or are bothersome.     (Refer to the teaching your nurse gave you for side effects to call your doctor about)    - Common side effects may include:  stuffy nose, headache, feeling tired, muscle aches, upset stomach  - Before receiving any vaccines     - Call [facility] at   If:  - You get sick, are on antibiotics, have had a recent vaccine, have surgery or dental work and your doctor wants your visit rescheduled.  - You need to cancel and reschedule your  visit for any reason. Call at least 2 days before your visit if you need to cancel.   - Your insurance changes before your next visit.    (We will need to get approval from your new insurance. This can take up to two weeks.)     [facility] is opened Monday thru Friday. We are closed on weekends and holidays.   Voice mail will take your call if the center is closed. If you leave a message please allow 24 hours for a call back during weekdays. If you leave a message on a weekend/holiday, we will call you back the next business day.    A pharmacist is available Monday - Friday from 8:30AM to 3:30PM to help answer any questions you may have about your prescriptions(s). Please call pharmacy at:    [facility]: [phone]  [facility]: [phone]  [facility]: [phone]            Today :We administered golimumab (Simponi Aria) 137.5 mg in sodium chloride 0.9% 100 mL IV and heparin flush.     For:   1. Psoriatic arthritis (Multi)         Your next appointment is due in:          Please read the  Medication Guide that was given to you and reviewed during todays visit.     (Tell all doctors including dentists that you are taking this medication)     Go to the emergency room or call 911 if:  -You have signs of allergic reaction:   -Rash, hives, itching.   -Swollen, blistered, peeling skin.   -Swelling of face, lips, mouth, tongue or throat.   -Tightness of chest, trouble breathing, swallowing or talking     Call your doctor:  - If IV / injection site gets red, warm, swollen, itchy or leaks fluid or pus.     (Leave dressing on your IV site for at least 2 hours and keep area clean and dry  - If you get sick or have symptoms of infection or are not feeling well for any reason.    (Wash your hands often, stay away from people who are sick)  - If you have side effects from your medication that do not go away or are bothersome.     (Refer to the teaching  your nurse gave you for side effects to call your doctor about)    - Common side effects may include:  stuffy nose, headache, feeling tired, muscle aches, upset stomach  - Before receiving any vaccines     - Call [facility] at   If:  - You get sick, are on antibiotics, have had a recent vaccine, have surgery or dental work and your doctor wants your visit rescheduled.  - You need to cancel and reschedule your visit for any reason. Call at least 2 days before your visit if you need to cancel.   - Your insurance changes before your next visit.    (We will need to get approval from your new insurance. This can take up to two weeks.)     [facility] is opened Monday thru Friday. We are closed on weekends and holidays.   Voice mail will take your call if the center is closed. If you leave a message please allow 24 hours for a call back during weekdays. If you leave a message on a weekend/holiday, we will call you back the next business day.    A pharmacist is available Monday - Friday from 8:30AM to 3:30PM to help answer any questions you may have about your prescriptions(s). Please call pharmacy at:    [facility]: [phone]  [facility]: [phone]  [facility]: [phone]

## 2024-12-17 NOTE — SIGNIFICANT EVENT
Simponi complete.  Port flushed with Normal Saline then 5 mlHeparin fllush solution.  Jackson needle removed without difficulty.  Dry dressing applied.

## 2024-12-17 NOTE — PROGRESS NOTES
[facility]   Infusion Clinic Note   Date: 2024   Name: Nesha Mcleod  : 1955   MRN: [id number]          Reason for Visit: OP Infusion (Simponi 137.5 mg every 56 days)         Today: We administered golimumab (Simponi Aria) 137.5 mg in sodium chloride 0.9% 100 mL IV and heparin flush.       Visit Type: INFUSION       Ordered By: Dr. Valdes       Accompanied by:Self       Diagnosis: Psoriatic arthritis (Multi)        Allergies:   Allergies as of 2024 - Reviewed 2024   Allergen Reaction Noted    Methotrexate Swelling 10/11/2023    Trazodone Other 10/11/2023    Adhesive tape-silicones Hives 10/11/2023    Bleach (sodium hypochlorite) Hives, Unknown, and Itching 2016    Chlorine Unknown and Other 10/11/2023    Ciprofloxacin Other and Unknown 2011    Citalopram Other 10/11/2023    Codeine Hives and Rash 2011    Doxycycline Nausea And Vomiting and Unknown 10/11/2023    Latex Hives and Unknown 2011    Morphine Hives and Rash 2011    Penicillins Hives 10/11/2023    Soap Unknown and Hives 10/11/2023    Tetracycline Hives 10/11/2023    Amitriptyline Other 2011    Grass pollen Itching 2016    Infliximab Unknown 10/11/2023    Other Hives and Unknown 10/11/2023    Rubber, unspecified Unknown 10/11/2023    Trace metals Unknown 10/11/2023    Weed pollen Itching 2016    Methocarbamol Rash and Unknown 10/11/2023    Nickel Rash 10/11/2023    Sulfa (sulfonamide antibiotics) Nausea/vomiting and GI Upset 2011          Current Medications has a current medication list which includes the following prescription(s): acetaminophen, aspirin, atorvastatin, bupropion sr, cinnamon bark extract, cyclobenzaprine, furosemide, gabapentin, hydromorphone, [START ON 2024] hydromorphone, [START ON 2025] hydromorphone, leflunomide, levothyroxine, lisinopril, magnesium oxide, metformin, metformin, multivit-minerals/folic acid, naloxone,  onetouch ultra test, potassium gluconate, sodium chloride 0.9% parenteral solution with golimumab 12.5 mg/mL solution 2 mg/kg, and tresiba flextouch u-100, and the following Facility-Administered Medications: heparin flush and methylprednisolone sod succinate.       Vitals:   Vitals:    12/17/24 1344 12/17/24 1450   BP: 163/75 152/90   Pulse: 92 96   Resp: 18 18   Temp: 36.4 °C (97.5 °F) 36.4 °C (97.5 °F)   SpO2: 100% 99%   Weight: 77.2 kg (170 lb 4.8 oz)    PainSc:   8    PainLoc: Back  Comment: and knees              Infusion Pre-procedure Checklist:   - Allergies reviewed: yes   - Medications reviewed: yes       - Previous reaction to current treatment: no      Assess patient for the concerns below. Document provider notification as appropriate.  - Active or recent infection with/without current antibiotic use: no  - Recent or planned invasive dental work: no  - Recent or planned surgeries: no  - Recently received or plans to receive vaccinations: no  - Has treatment related toxicities: no  - Is pregnant:  n/a      Pain: 8   - Is the pain different from normal: no   - Is your Doctor aware:  yes       Labs: N/A          Fall Risk Screening: Fan Fall Risk  History of Falling, Immediate or Within 3 Months: Yes  Secondary Diagnosis: Yes  Ambulatory Aid: Crutches/cane/walker  Intravenous Therapy/Heparin Lock: Yes  Gait/Transferring: Normal/bedrest/immobile  Mental Status: Oriented to own ability  Fan Fall Risk Score: 75       Review Of Systems:  Review of Systems   Constitutional:  Negative for appetite change, chills, fatigue, fever and unexpected weight change.   HENT:   Negative for hearing loss, mouth sores, sore throat, tinnitus, trouble swallowing and voice change.    Eyes:  Negative for eye problems.   Respiratory:  Negative for cough, shortness of breath and wheezing.    Cardiovascular:  Negative for chest pain, leg swelling and palpitations.   Gastrointestinal:  Negative for abdominal pain, blood in  "stool, constipation, diarrhea, nausea and vomiting.   Genitourinary:  Negative for dysuria, frequency and hematuria.    Musculoskeletal:  Positive for arthralgias and myalgias.   Skin:  Negative for itching, rash and wound.   Neurological:  Negative for dizziness, extremity weakness, headaches, light-headedness and numbness.         ROS completed? Yes      Infusion Readiness:  - Assessment Concerns Related to Infusion: No  - Provider notified: n/a      Document Below Only If Indicated:   New Patient Education:    N/A (returning patient for continuation of therapy. Ongoing education provided as needed.)        Treatment Conditions & Drug Specific Questions:    Golimumab  (SIMPONI)    (Unless otherwise specified on patient specific therapy plan):     TREATMENT CONDITIONS:  Unless otherwise specified on patient specific thearpy plan HOLD and notify provider prior to proceeding with today's infusion if patient with:  o Positive T-Spot  o Positive Hepatitis B Surface Ag / Hepatitis C     Lab Results   Component Value Date    TBSIN Negative 10/22/2024    QFG NEGATIVE 07/30/2018      Lab Results   Component Value Date    HEPBSAG Nonreactive 10/22/2024      No results found for: \"NONUHFIRE\", \"NONUHSWGH\", \"NONUHFISH\", \"EXTHEPBSAG\"  No results found for: \"HEPATOT\", \"HEPAIGM\", \"HEPBCIGM\", \"HEPBCAB\", \"HBEAG\", \"HEPCAB\"   No results found for: \"HEPCAB\"  No results found for: \"HBCTI\", \"HEPBCAB\"    Lab Results   Component Value Date    WBC 6.3 08/27/2024    HGB 12.0 08/27/2024    HCT 37.3 08/27/2024    MCV 94 08/27/2024     08/27/2024        Labs reviewed and patient meets treatment conditions? Yes    DRUG SPECIFIC QUESTIONS:   - Up to date on all immunizations per patient report? Yes    Available Immunization Records:  Immunization History   Administered Date(s) Administered    COVID-19, mRNA, LNP-S, PF, 30 mcg/0.3 mL dose 01/17/2021, 03/27/2021    DTaP, Unspecified 12/05/2019    Flu vaccine (IIV4), preservative free *Check " age/dose* 09/28/2013, 09/14/2016, 10/06/2020    Flu vaccine, trivalent, preservative free, HIGH-DOSE, age 65y+ (Fluzone) 09/04/2019, 09/09/2023    Influenza, seasonal, injectable 10/09/2014, 10/26/2015    Novel influenza-H1N1-09, preservative-free 12/23/2009    Pfizer Gray Cap SARS-CoV-2 04/25/2022    Pfizer Purple Cap SARS-CoV-2 08/20/2021    Pneumococcal conjugate vaccine, 20-valent (PREVNAR 20) 05/27/2022    Pneumococcal polysaccharide vaccine, 23-valent, age 2 years and older (PNEUMOVAX 23) 10/13/2014, 01/13/2017    Tdap vaccine, age 7 year and older (BOOSTRIX, ADACEL) 05/27/2022    Zoster, live 11/03/2012         - Any history of or new or worsening s/s of heart failure which may include dyspnea, edema? No  (If worsening s/s notify provider prior to proceeding. Simponi may cause exacerbation of heart failure)     - Any new diagnosis of cancer, especially lymphomas? No    (Box Warning: Malignancy. If YES notify prescribing provider prior to proceeding )      REMINDER:  WEIGHT BASED DRUG    Recommended Vitals/Observation:  Vitals: Take vital signs prior to starting infusion, at infusion conclusion and as needed.   Observation: No observation.        Weight Based Drug Calculations:    WEIGHT BASED DRUGS: Golimumab (SIMPONI)   Patient's dosing weight (kg): 68.9    10% weight variance for prescribed treatment: 62.01 kg to 75.79kg     Patient's weight today:   Vitals:    12/17/24 1344   Weight: 77.2 kg (170 lb 4.8 oz)         weight range for prescribed dose:     Patient weight today falls outside of 10% variance or  weight range: Yes     Home Care pharmacist informed of weight variance: Yes    Doses that are weight based have an acceptable variance rule within 10% of the prescribed   order and/or within  weight range. If patient weight on day of infusion falls   outside of the 10% variance, or weight range, infusion is administered and   pharmacy contacted regarding future  dosing adjustments, per policy.         Initiated By: Taylor Fisher RN  _________________________________________________________________________    Note authored and patient cared for by: Taylor Fisher RN  Note/Encounter reviewed by: Yaa CORDON NP. This provider on site at time of patient infusion. Infusion staff to notify this provider of any questions, concerns, abnormals or issues during infusion.    Final check of medication completed by this [facility] / or on-site pharmacist with administering nurse using positive identification prior to administration. Final appearance of product checked for accuracy and conformity to the formula of the prepared product. Assured use of correct ingredients, accurate calculations and precise measurements under appropriate conditions and procedures.    No issues reported during today's encounter. Pt. tolerated infusion without difficulty. Pt. not independently evaluated by this provider during today's encounter.  (Yaa CORDON NP)

## 2025-01-10 ENCOUNTER — APPOINTMENT (OUTPATIENT)
Dept: PRIMARY CARE | Facility: CLINIC | Age: 70
End: 2025-01-10
Payer: MEDICARE

## 2025-01-15 DIAGNOSIS — Z79.4 TYPE 2 DIABETES MELLITUS WITHOUT COMPLICATION, WITH LONG-TERM CURRENT USE OF INSULIN (MULTI): ICD-10-CM

## 2025-01-15 DIAGNOSIS — E11.9 TYPE 2 DIABETES MELLITUS WITHOUT COMPLICATION, WITH LONG-TERM CURRENT USE OF INSULIN (MULTI): ICD-10-CM

## 2025-01-16 RX ORDER — METFORMIN HYDROCHLORIDE 500 MG/1
500 TABLET ORAL
Qty: 180 TABLET | Refills: 3 | Status: SHIPPED | OUTPATIENT
Start: 2025-01-16

## 2025-01-17 ENCOUNTER — TELEPHONE (OUTPATIENT)
Dept: PRIMARY CARE | Facility: CLINIC | Age: 70
End: 2025-01-17
Payer: MEDICARE

## 2025-01-20 ENCOUNTER — OFFICE VISIT (OUTPATIENT)
Dept: PRIMARY CARE | Facility: CLINIC | Age: 70
End: 2025-01-20
Payer: MEDICARE

## 2025-01-20 VITALS
BODY MASS INDEX: 35.11 KG/M2 | OXYGEN SATURATION: 92 % | HEART RATE: 61 BPM | SYSTOLIC BLOOD PRESSURE: 110 MMHG | RESPIRATION RATE: 18 BRPM | TEMPERATURE: 99.9 F | DIASTOLIC BLOOD PRESSURE: 68 MMHG | WEIGHT: 168 LBS

## 2025-01-20 DIAGNOSIS — E78.2 MIXED HYPERLIPIDEMIA: ICD-10-CM

## 2025-01-20 DIAGNOSIS — L40.50 PSORIATIC ARTHRITIS (MULTI): ICD-10-CM

## 2025-01-20 DIAGNOSIS — E06.3 AUTOIMMUNE THYROIDITIS: ICD-10-CM

## 2025-01-20 DIAGNOSIS — E11.9 TYPE 2 DIABETES MELLITUS WITHOUT COMPLICATION, WITH LONG-TERM CURRENT USE OF INSULIN (MULTI): ICD-10-CM

## 2025-01-20 DIAGNOSIS — I10 ESSENTIAL (PRIMARY) HYPERTENSION: Primary | ICD-10-CM

## 2025-01-20 DIAGNOSIS — Z79.4 TYPE 2 DIABETES MELLITUS WITHOUT COMPLICATION, WITH LONG-TERM CURRENT USE OF INSULIN (MULTI): ICD-10-CM

## 2025-01-20 DIAGNOSIS — F41.9 ANXIETY: ICD-10-CM

## 2025-01-20 PROCEDURE — 3074F SYST BP LT 130 MM HG: CPT | Performed by: NURSE PRACTITIONER

## 2025-01-20 PROCEDURE — 99349 HOME/RES VST EST MOD MDM 40: CPT | Performed by: NURSE PRACTITIONER

## 2025-01-20 PROCEDURE — 4010F ACE/ARB THERAPY RXD/TAKEN: CPT | Performed by: NURSE PRACTITIONER

## 2025-01-20 PROCEDURE — 1125F AMNT PAIN NOTED PAIN PRSNT: CPT | Performed by: NURSE PRACTITIONER

## 2025-01-20 PROCEDURE — 3078F DIAST BP <80 MM HG: CPT | Performed by: NURSE PRACTITIONER

## 2025-01-20 ASSESSMENT — ENCOUNTER SYMPTOMS
PSYCHIATRIC NEGATIVE: 1
ARTHRALGIAS: 1
ALLERGIC/IMMUNOLOGIC NEGATIVE: 1
HEMATOLOGIC/LYMPHATIC NEGATIVE: 1
WEAKNESS: 1
FREQUENCY: 1
BACK PAIN: 1
SHORTNESS OF BREATH: 0
CARDIOVASCULAR NEGATIVE: 1
EYES NEGATIVE: 1
NECK PAIN: 1
NECK STIFFNESS: 1
FATIGUE: 1
ACTIVITY CHANGE: 1
RESPIRATORY NEGATIVE: 1
COUGH: 0
NUMBNESS: 1
UNEXPECTED WEIGHT CHANGE: 1
CONSTIPATION: 1
APPETITE CHANGE: 1
DIARRHEA: 1

## 2025-01-20 ASSESSMENT — PAIN SCALES - GENERAL: PAINLEVEL_OUTOF10: 10-WORST PAIN EVER

## 2025-01-20 NOTE — PROGRESS NOTES
Subjective   Patient ID: Nesha Mcleod is a 69 y.o. female who is being seen for 2 month routine house calls follow up.    HPI Pt seen in single family apartment unaccompanied. PMHx: Chronic pain, DDD, HTN, HLD, DM, Hypothyroid, Psoriatic arthritis, GERD. Pt seen sitting in chair with legs dependent. Pt alert and oriented and cooperative with examination. Pt with c/o back pain. Pt states she fell back on toilet 2 weeks ago. Pt can not wear denture at present thinks she may need an implant removed, she had CT scan of mouth and is awaiting the result.   Pt reports blood sugars running 120-170's. Reports that she hasn't been taking insulin lately due to low sugars.  Pt has stopped meals on wheels last week due to being high carb and high sugar fruits. Pt going to do lean cuisine and frozen foods. Pt started drinking boost daily. Energy reported as having none, pt unable to stand on own, must be holding on to Rolator. Pt not moving around much due to back pain. Pt with c/o increased appetite and feels like she is eating too much and gaining weight. Pt trying to follow diabetic diet. Pt reports sleep at night is good with taking sleeping pills. Pt has been sleeping on couch at times. Pt denies fever, chills, night sweats, headaches, or dizzieness. Pt denies SOB, cough, CP or palpitations. Pt denies N/V admits to constipation alternating with diarrhea. Pt admits to urinary frequency and urgency. Pt with follow up next month with pain management. Pt sees rheumatologist in April. Pt with no other concerns or complaints at present.   Pt only leaving the house once a week, will leave for doctor appointments and shopping.   Home Visit medically necessary due to: pt has chronic condition that makes access to a traditional office visit very difficult, illness or condition that results in activity limitation or restriction that impacts the ability to leave home such as; unsteady gait/ poor condition.      Review of Systems    Constitutional:  Positive for activity change, appetite change, fatigue and unexpected weight change.   HENT: Negative.     Eyes: Negative.    Respiratory: Negative.  Negative for cough and shortness of breath.    Cardiovascular: Negative.  Negative for chest pain and leg swelling.   Gastrointestinal:  Positive for constipation and diarrhea.   Endocrine: Positive for cold intolerance.   Genitourinary:  Positive for frequency and urgency.   Musculoskeletal:  Positive for arthralgias (knees, sholulders, hips.), back pain, gait problem, neck pain and neck stiffness.   Skin: Negative.    Allergic/Immunologic: Negative.    Neurological:  Positive for weakness and numbness (feet).   Hematological: Negative.    Psychiatric/Behavioral: Negative.         Objective   /68 (BP Location: Left arm, Patient Position: Sitting, BP Cuff Size: Adult)   Pulse 61   Temp 37.7 °C (99.9 °F) (Temporal)   Resp 18   Wt 76.2 kg (168 lb)   SpO2 92%   BMI 35.11 kg/m²         Current Outpatient Medications:     acetaminophen (Tylenol) 325 mg tablet, Take 2 tablets (650 mg) by mouth every 4 hours if needed for mild pain (1 - 3), headaches or fever (temp greater than 38.0 C). THIS ORDER IS SUPERSEDED BY ANY OTHER MEDICATION ORDER FOR MILD PAIN (1-3). MAY BE USED FOR A HIGHER PAIN SCORE IF PATIENT REQUESTS, Disp: , Rfl:     aspirin 81 mg chewable tablet, Chew 1 tablet (81 mg) once daily. HOLD IF GIVEN IN LAST 24 HOURS OR IF HISTORY OF HYPERSENSITIVITY., Disp: , Rfl:     atorvastatin (Lipitor) 40 mg tablet, TAKE 1 TABLET BY MOUTH ONCE  DAILY, Disp: 90 tablet, Rfl: 3    buPROPion SR (Wellbutrin SR) 100 mg 12 hr tablet, TAKE 1 TABLET BY MOUTH ONCE  DAILY DO NOT CRUSH, CHEW, OR  SPLIT, Disp: 90 tablet, Rfl: 3    cinnamon bark extract 500 mg tablet, Cinnamon 500 MG as directed Orally daily Active, Disp: , Rfl:     cyclobenzaprine (Flexeril) 10 mg tablet, Take 1 tablet (10 mg) by mouth 2 times a day as needed for muscle spasms., Disp: 60  tablet, Rfl: 2    furosemide (Lasix) 40 mg tablet, TAKE 1 TABLET BY MOUTH ONCE  DAILY, Disp: 90 tablet, Rfl: 3    gabapentin (Neurontin) 300 mg capsule, Take 1 capsule (300 mg) by mouth 2 times a day. Do not fill before December 8, 2024., Disp: 60 capsule, Rfl: 2    HYDROmorphone (Dilaudid) 2 mg tablet, Take 1 tablet (2 mg) by mouth 3 times a day as needed for moderate pain (4 - 6) for up to 28 days. Do not fill before December 30, 2024., Disp: 84 tablet, Rfl: 0    leflunomide (Arava) 10 mg tablet, TAKE 1 TABLET BY MOUTH ONCE  DAILY, Disp: 90 tablet, Rfl: 3    levothyroxine (Synthroid, Levoxyl) 75 mcg tablet, TAKE 1 TABLET BY MOUTH ONCE  DAILY IN THE MORNING BEFORE A  MEAL, Disp: 90 tablet, Rfl: 3    lisinopril 20 mg tablet, TAKE 1 TABLET BY MOUTH ONCE  DAILY, Disp: 90 tablet, Rfl: 3    magnesium oxide (Mag-Ox) 250 mg magnesium tablet, Take 1 tablet (250 mg) by mouth once daily., Disp: , Rfl:     multivit-minerals/folic acid (CENTRUM ADULTS ORAL), Take by mouth once daily., Disp: , Rfl:     naloxone (Narcan) 4 mg/0.1 mL nasal spray, Administer 1 spray (4 mg) into affected nostril(s) if needed for opioid reversal or respiratory depression. May repeat every 2-3 minutes if needed, alternating nostrils, until medical assistance becomes available., Disp: 2 each, Rfl: 0    OneTouch Ultra Test strip, Apply 1 strip topically once daily., Disp: 100 strip, Rfl: 3    potassium gluconate 595 mg (99 mg) tablet, Take 1 tablet by mouth once daily., Disp: , Rfl:     sodium chloride 0.9% parenteral solution with golimumab 12.5 mg/mL solution 2 mg/kg, Infuse 2 mg/kg into a venous catheter every 8 (eight) weeks., Disp: , Rfl:     Tresiba FlexTouch U-100 100 unit/mL (3 mL) injection, Inject 8 Units under the skin once daily in the morning., Disp: 9 mL, Rfl: 3    HYDROmorphone (Dilaudid) 2 mg tablet, Take 1 tablet (2 mg) by mouth 3 times a day as needed for moderate pain (4 - 6) for up to 28 days., Disp: 84 tablet, Rfl: 0    [START ON  1/27/2025] HYDROmorphone (Dilaudid) 2 mg tablet, Take 1 tablet (2 mg) by mouth 3 times a day as needed for moderate pain (4 - 6) for up to 28 days. Do not fill before January 27, 2025., Disp: 84 tablet, Rfl: 0    metFORMIN (Glucophage) 500 mg tablet, Take 1 tablet (500 mg) by mouth 2 times a day with meals for 14 days., Disp: 28 tablet, Rfl: 0    metFORMIN (Glucophage) 500 mg tablet, TAKE 1 TABLET BY MOUTH TWICE  DAILY WITH MEALS (Patient not taking: Reported on 1/20/2025), Disp: 180 tablet, Rfl: 3      Physical Exam  Constitutional:       Appearance: Normal appearance. She is obese.   HENT:      Head: Normocephalic and atraumatic.      Nose: Nose normal.      Mouth/Throat:      Mouth: Mucous membranes are moist.      Dentition: Has dentures.      Pharynx: Oropharynx is clear.      Comments: Missing top dentures  Eyes:      Conjunctiva/sclera: Conjunctivae normal.      Pupils: Pupils are equal, round, and reactive to light.   Cardiovascular:      Rate and Rhythm: Normal rate and regular rhythm.      Pulses: Normal pulses.      Heart sounds: Normal heart sounds.   Pulmonary:      Effort: Pulmonary effort is normal.      Breath sounds: Examination of the right-upper field reveals decreased breath sounds. Examination of the left-upper field reveals decreased breath sounds. Examination of the right-middle field reveals decreased breath sounds. Examination of the left-middle field reveals decreased breath sounds. Examination of the right-lower field reveals decreased breath sounds. Examination of the left-lower field reveals decreased breath sounds. Decreased breath sounds present.   Abdominal:      General: Bowel sounds are normal.      Palpations: Abdomen is soft.   Musculoskeletal:         General: Tenderness (knees, back, shoulders,) present.      Cervical back: Normal range of motion and neck supple. Tenderness present.   Skin:     General: Skin is warm and dry.      Capillary Refill: Capillary refill takes 2 to 3  seconds.   Neurological:      Mental Status: She is alert and oriented to person, place, and time.      Motor: Weakness present.      Gait: Gait abnormal.   Psychiatric:         Mood and Affect: Mood normal.         Behavior: Behavior normal.         Thought Content: Thought content normal.         Judgment: Judgment normal.     Patient Active Problem List   Diagnosis    Psoriatic arthritis (Multi)    Overactive bladder    Myalgia    Soft tissue mass    Neurogenic claudication due to lumbar spinal stenosis    Acquired partial lipodystrophy    Acute cystitis without hematuria    Acute renal failure syndrome (CMS-HCC)    ALT (SGPT) level raised    Altered mental status    Anal sphincter incontinence    Anxiety    Arthritis    Arthritis of neck    Autoimmune thyroiditis    Blepharitis    Blurred vision, bilateral    Cataract, nuclear sclerotic, both eyes    Cellulitis    Cervical radicular pain    Pain, radicular, lumbar    Choroidal nevus, left eye    Chronic intractable pain    Chronic low back pain    Clouded consciousness    Colovaginal fistula    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    Consecutive exotropia    Change in bowel habits    Constipation    Contact dermatitis    Controlled insulin dependent type 1 diabetes mellitus (Multi)    Type 2 diabetes mellitus    DDD (degenerative disc disease), thoracic    Dermatochalasis    Difficulty walking    Disorder of refraction    Disturbance, visual, subjective    Diverticulosis of colon    Dry eyes    Early cataract    Elevated creatine kinase    Essential (primary) hypertension    Fecal soiling due to fecal incontinence    Fistula involving female genital tract    Fracture of cervical vertebra    Gastroesophageal reflux disease    Generalized osteoarthritis of multiple sites    Hemorrhoids without complication    Iron deficiency anemia    Leukopenia    Lumbar radiculitis    Mixed hyperlipidemia    Myofascial pain    Numbness    Obesity with body mass index 30 or  greater    Palsy of conjugate gaze    Poor venous access    Incisional hernia    Postlaminectomy syndrome, lumbar region    Pseudophakia of both eyes    Psoriasis    Recurrent urinary tract infection    Retinopathy, background, nonproliferative, mild    Skin excoriation    Swelling of upper arm    Vaginal disorder    Weakness generalized    Frequent falls       Preventative Medicine Counseling: Medication Education: Education: Current medication list reviewed and discussed, understanding, patient expressed understanding, adherence: No barriers to adherence identified.      Assessment/Plan   Diagnoses and all orders for this visit:  Essential (primary) hypertension  Comments:  Stable  Continue Lisinopril 20 mg po daily.  Continue Lasix 40 mg po daily.  Type 2 diabetes mellitus without complication, with long-term current use of insulin (Multi)  Comments:  Continue Metformin 500 mg po twice daily.  Cotinue Tresiba 8 units sq daily.   Continue blood sugar checks.   Diabetic diet.  Autoimmune thyroiditis  Comments:  Continue Levothyroxine 75 mcg po daily.  Psoriatic arthritis (Multi)  Comments:  Continue Arava 10 mg po daily.  Continue Golimumab infusion every 8 weeks.  Mixed hyperlipidemia  Comments:  Continue Lipitor 40 mg po daily.  Anxiety  Comments:  Continue Wellbutrin 100 mg daily.    Follow up in 2 months with labs or prn,  Angela Dyer, APRN-CNP

## 2025-01-28 ENCOUNTER — TELEPHONE (OUTPATIENT)
Dept: PAIN MEDICINE | Facility: CLINIC | Age: 70
End: 2025-01-28
Payer: MEDICARE

## 2025-01-28 NOTE — TELEPHONE ENCOUNTER
Patient having very bad low back pain, was moving boxes. Would like something called in but unable to take Prednisone, she is diabetic. [facility]

## 2025-01-28 NOTE — TELEPHONE ENCOUNTER
Talked with patient.  She was not aware that she had another script on hold for hydromorphone that she should be able to fill 1/31/25 and she has refills for flexeril and gabapentin.   She has prednisone from her primary and she decided to cut them in half and try that.  If she's not better she will call for an earlier office visit if able.

## 2025-02-11 ENCOUNTER — APPOINTMENT (OUTPATIENT)
Dept: INFUSION THERAPY | Facility: CLINIC | Age: 70
End: 2025-02-11
Payer: MEDICARE

## 2025-02-11 VITALS
OXYGEN SATURATION: 98 % | DIASTOLIC BLOOD PRESSURE: 78 MMHG | WEIGHT: 171 LBS | TEMPERATURE: 98.1 F | RESPIRATION RATE: 18 BRPM | HEART RATE: 98 BPM | BODY MASS INDEX: 35.74 KG/M2 | SYSTOLIC BLOOD PRESSURE: 132 MMHG

## 2025-02-11 DIAGNOSIS — L40.50 PSORIATIC ARTHRITIS (MULTI): ICD-10-CM

## 2025-02-11 PROCEDURE — 96365 THER/PROPH/DIAG IV INF INIT: CPT | Performed by: NURSE PRACTITIONER

## 2025-02-11 PROCEDURE — 96375 TX/PRO/DX INJ NEW DRUG ADDON: CPT | Performed by: NURSE PRACTITIONER

## 2025-02-11 RX ORDER — HEPARIN SODIUM,PORCINE/PF 10 UNIT/ML
50 SYRINGE (ML) INTRAVENOUS AS NEEDED
OUTPATIENT
Start: 2025-02-11

## 2025-02-11 RX ORDER — HEPARIN 100 UNIT/ML
500 SYRINGE INTRAVENOUS AS NEEDED
OUTPATIENT
Start: 2025-02-11

## 2025-02-11 RX ORDER — DIPHENHYDRAMINE HYDROCHLORIDE 50 MG/ML
50 INJECTION INTRAMUSCULAR; INTRAVENOUS AS NEEDED
OUTPATIENT
Start: 2025-03-02

## 2025-02-11 RX ORDER — EPINEPHRINE 0.3 MG/.3ML
0.3 INJECTION SUBCUTANEOUS EVERY 5 MIN PRN
OUTPATIENT
Start: 2025-03-02

## 2025-02-11 RX ORDER — ALBUTEROL SULFATE 0.83 MG/ML
3 SOLUTION RESPIRATORY (INHALATION) AS NEEDED
OUTPATIENT
Start: 2025-03-02

## 2025-02-11 RX ORDER — FAMOTIDINE 10 MG/ML
20 INJECTION INTRAVENOUS ONCE AS NEEDED
OUTPATIENT
Start: 2025-03-02

## 2025-02-11 ASSESSMENT — ENCOUNTER SYMPTOMS
HEADACHES: 0
COUGH: 0
UNEXPECTED WEIGHT CHANGE: 0
VOMITING: 0
LEG SWELLING: 0
WHEEZING: 0
FATIGUE: 0
BACK PAIN: 1
TROUBLE SWALLOWING: 0
NAUSEA: 0
ABDOMINAL PAIN: 0
SHORTNESS OF BREATH: 0
NUMBNESS: 0
DYSURIA: 0
DIARRHEA: 0
VOICE CHANGE: 0
EYE PROBLEMS: 0
APPETITE CHANGE: 0
SORE THROAT: 0
EXTREMITY WEAKNESS: 0
HEMATURIA: 0
MYALGIAS: 1
LIGHT-HEADEDNESS: 0
DIZZINESS: 0
WOUND: 0
CONSTIPATION: 0
BLOOD IN STOOL: 0
FEVER: 0
FREQUENCY: 0
CHILLS: 0
ARTHRALGIAS: 1
PALPITATIONS: 0

## 2025-02-11 ASSESSMENT — PAIN SCALES - GENERAL: PAINLEVEL_OUTOF10: 7

## 2025-02-11 NOTE — PROGRESS NOTES
[facility]   Infusion Clinic Note   Date: 2025   Name: Nesha Mcleod  : 1955   MRN: [id number]          Reason for Visit: OP Infusion (Simponi)         Today: We administered golimumab (Simponi Aria) 137.5 mg in sodium chloride 0.9% 100 mL IV and alteplase.      Visit Type: INFUSION       Ordered By: Sheila Valdes MD        Accompanied by: Self       Diagnosis: Psoriatic arthritis (Multi)        Allergies:   Allergies as of 2025 - Reviewed 2025   Allergen Reaction Noted    Methotrexate Swelling 10/11/2023    Trazodone Other 10/11/2023    Adhesive tape-silicones Hives 10/11/2023    Bleach (sodium hypochlorite) Hives, Unknown, and Itching 2016    Chlorine Unknown and Other 10/11/2023    Ciprofloxacin Other and Unknown 2011    Citalopram Other 10/11/2023    Codeine Hives and Rash 2011    Doxycycline Nausea And Vomiting and Unknown 10/11/2023    Latex Hives and Unknown 2011    Morphine Hives and Rash 2011    Penicillins Hives 10/11/2023    Soap Unknown and Hives 10/11/2023    Tetracycline Hives 10/11/2023    Amitriptyline Other 2011    Grass pollen Itching 2016    Infliximab Unknown 10/11/2023    Other Hives and Unknown 10/11/2023    Rubber, unspecified Unknown 10/11/2023    Trace metals Unknown 10/11/2023    Weed pollen Itching 2016    Methocarbamol Rash and Unknown 10/11/2023    Nickel Rash 10/11/2023    Sulfa (sulfonamide antibiotics) Nausea/vomiting and GI Upset 2011          Current Medications: has a current medication list which includes the following prescription(s): acetaminophen, aspirin, atorvastatin, bupropion sr, cinnamon bark extract, cyclobenzaprine, furosemide, gabapentin, hydromorphone, hydromorphone, hydromorphone, leflunomide, levothyroxine, lisinopril, magnesium oxide, metformin, metformin, multivit-minerals/folic acid, naloxone, onetouch ultra test, potassium gluconate, sodium chloride  0.9% parenteral solution with golimumab 12.5 mg/mL solution 2 mg/kg, and tresiba flextouch u-100, and the following Facility-Administered Medications: alteplase and methylprednisolone sod succinate.       Vitals:   Vitals:    02/11/25 1500   BP: 132/78   Resp: 18   Temp: 36.7 °C (98.1 °F)   SpO2: 98%   Weight: 77.6 kg (171 lb)   PainSc:   7   PainLoc: Back             Infusion Pre-procedure Checklist:   - Allergies & Medications reviewed: yes       - Previous reaction to current treatment: no      Assess patient for the concerns below. Document provider notification as appropriate.  - Active or recent infection with/without current antibiotic use: no  - Recent or planned invasive dental work: no  - Recent or planned surgeries: no  - Recently received or plans to receive vaccinations: no  - Has treatment related toxicities: no  - Any chance you may be pregnant:  n/a      Pain: 7   - Is the pain different from normal: no   - Is your Doctor aware:  yes       Labs: N/A          Fall Risk Screening: Fan Fall Risk  History of Falling, Immediate or Within 3 Months: Yes  Secondary Diagnosis: Yes  Ambulatory Aid: Crutches/cane/walker  Intravenous Therapy/Heparin Lock: Yes  Gait/Transferring: Weak  Mental Status: Oriented to own ability  Fan Fall Risk Score: 85       Review Of Systems:  Review of Systems   Constitutional:  Negative for appetite change, chills, fatigue, fever and unexpected weight change.   HENT:   Negative for hearing loss, mouth sores, sore throat, tinnitus, trouble swallowing and voice change.    Eyes:  Negative for eye problems.   Respiratory:  Negative for cough, shortness of breath and wheezing.    Cardiovascular:  Negative for chest pain, leg swelling and palpitations.   Gastrointestinal:  Negative for abdominal pain, blood in stool, constipation, diarrhea, nausea and vomiting.   Genitourinary:  Negative for dysuria, frequency and hematuria.    Musculoskeletal:  Positive for arthralgias, back pain  "and myalgias.   Skin:  Negative for itching, rash and wound.   Neurological:  Negative for dizziness, extremity weakness, headaches, light-headedness and numbness.         ROS completed? Yes      Infusion Readiness:  - Assessment Concerns Related to Infusion: No  - Provider notified: n/a      Document Below Only If Indicated:   New Patient Education:    N/A (returning patient for continuation of therapy. Ongoing education provided as needed.)        Treatment Conditions & Drug Specific Questions:    Golimumab  (SIMPONI)    (Unless otherwise specified on patient specific therapy plan):     TREATMENT CONDITIONS:  Unless otherwise specified on patient specific thearpy plan HOLD and notify provider prior to proceeding with today's infusion if patient with:  o Positive T-Spot  o Positive Hepatitis B Surface Ag / Hepatitis C     Lab Results   Component Value Date    TBSIN Negative 10/22/2024    QFG NEGATIVE 07/30/2018      Lab Results   Component Value Date    HEPBSAG Nonreactive 10/22/2024      No results found for: \"NONUHFIRE\", \"NONUHSWGH\", \"NONUHFISH\", \"EXTHEPBSAG\"  No results found for: \"HEPATOT\", \"HEPAIGM\", \"HEPBCIGM\", \"HEPBCAB\", \"HBEAG\", \"HEPCAB\"   No results found for: \"HEPCAB\"  No results found for: \"HBCTI\", \"HEPBCAB\"    Lab Results   Component Value Date    WBC 6.3 08/27/2024    HGB 12.0 08/27/2024    HCT 37.3 08/27/2024    MCV 94 08/27/2024     08/27/2024        Labs reviewed and patient meets treatment conditions? Yes    DRUG SPECIFIC QUESTIONS:   - Up to date on all immunizations per patient report? Yes    Available Immunization Records:  Immunization History   Administered Date(s) Administered    COVID-19, mRNA, LNP-S, PF, 30 mcg/0.3 mL dose 01/17/2021, 03/27/2021    DTaP, Unspecified 12/05/2019    Flu vaccine (IIV4), preservative free *Check age/dose* 09/28/2013, 09/14/2016, 10/06/2020    Flu vaccine, trivalent, preservative free, HIGH-DOSE, age 65y+ (Fluzone) 09/04/2019, 09/09/2023    Influenza, " seasonal, injectable 10/09/2014, 10/26/2015    Novel influenza-H1N1-09, preservative-free 12/23/2009    Pfizer Gray Cap SARS-CoV-2 04/25/2022    Pfizer Purple Cap SARS-CoV-2 08/20/2021    Pneumococcal conjugate vaccine, 20-valent (PREVNAR 20) 05/27/2022    Pneumococcal polysaccharide vaccine, 23-valent, age 2 years and older (PNEUMOVAX 23) 10/13/2014, 01/13/2017    Tdap vaccine, age 7 year and older (BOOSTRIX, ADACEL) 05/27/2022    Zoster, live 11/03/2012         - Any history of or new or worsening s/s of heart failure which may include dyspnea, edema? No  (If worsening s/s notify provider prior to proceeding. Simponi may cause exacerbation of heart failure)     - Any new diagnosis of cancer, especially lymphomas? No    (Box Warning: Malignancy. If YES notify prescribing provider prior to proceeding )      REMINDER:  WEIGHT BASED DRUG    Recommended Vitals/Observation:  Vitals: Take vital signs prior to starting infusion, at infusion conclusion and as needed.   Observation: No observation.        Weight Based Drug Calculations:    WEIGHT BASED DRUGS: Golimumab (SIMPONI)   Patient's dosing weight (kg): 68.9    10% weight variance for prescribed treatment: 62 kg to 76 kg     Patient's weight today:   Vitals:    02/11/25 1500   Weight: 77.6 kg (171 lb)         weight range for prescribed dose:     Patient weight today falls outside of 10% variance or  weight range: Yes     Home Care pharmacist informed of weight variance: Not applicable    Doses that are weight based have an acceptable variance rule within 10% of the prescribed   order and/or within  weight range. If patient weight on day of infusion falls   outside of the 10% variance, or weight range, infusion is administered and   pharmacy contacted regarding future dosing adjustments, per policy.         Initiated By: Taylor Fisher, RN   Pt. Has vascular access port to the right chest.  When accessed [facility]20 small needle  using sterile technique unable to obtain blood return.  Cath flow administered x2 and after second dose there was a brisk blood return.  Port flushed with Heparin 500 units and then de accessed with dry dressing applied.  Simponi was given via a peripheral line.  _________________________________________________________________________    Note authored and patient cared for by: Taylor Fisher RN  Note/Encounter reviewed by: Yaa CORDON NP. This provider on site at time of patient infusion. Infusion staff to notify this provider of any questions, concerns, abnormals or issues during infusion.    Final check of medication completed by this [facility] / or on-site pharmacist with administering nurse using positive identification prior to administration. Final appearance of product checked for accuracy and conformity to the formula of the prepared product. Assured use of correct ingredients, accurate calculations and precise measurements under appropriate conditions and procedures.    No issues reported during today's encounter. Pt. tolerated infusion without difficulty. Pt. not independently evaluated by this provider during today's encounter.  (Yaa CORDON NP)

## 2025-02-11 NOTE — PATIENT INSTRUCTIONS
Today :We administered golimumab (Simponi Aria) 137.5 mg in sodium chloride 0.9% 100 mL IV and alteplase.     For:   1. Psoriatic arthritis (Multi)         Your next appointment is due in: 4 weeks for port flush       Please read the  Medication Guide that was given to you and reviewed during todays visit.     (Tell all doctors including dentists that you are taking this medication)     Go to the emergency room or call 911 if:  -You have signs of allergic reaction:   -Rash, hives, itching.   -Swollen, blistered, peeling skin.   -Swelling of face, lips, mouth, tongue or throat.   -Tightness of chest, trouble breathing, swallowing or talking     Call your doctor:  - If IV / injection site gets red, warm, swollen, itchy or leaks fluid or pus.     (Leave dressing on your IV site for at least 2 hours and keep area clean and dry  - If you get sick or have symptoms of infection or are not feeling well for any reason.    (Wash your hands often, stay away from people who are sick)  - If you have side effects from your medication that do not go away or are bothersome.     (Refer to the teaching your nurse gave you for side effects to call your doctor about)    - Common side effects may include:  stuffy nose, headache, feeling tired, muscle aches, upset stomach  - Before receiving any vaccines     - [facility] at   If:  - You get sick, are on antibiotics, have had a recent vaccine, have surgery or dental work and your doctor wants your visit rescheduled.  - You need to cancel and reschedule your visit for any reason. Call at least 2 days before your visit if you need to cancel.   - Your insurance changes before your next visit.    (We will need to get approval from your new insurance. This can take up to two weeks.)     [facility] is opened Monday thru Friday. We are closed on weekends and holidays.   Voice mail will take your call if the center is closed. If you leave a message  please allow 24 hours for a call back during weekdays. If you leave a message on a weekend/holiday, we will call you back the next business day.    A pharmacist is available Monday - Friday from 8:30AM to 3:30PM to help answer any questions you may have about your prescriptions(s). Please call pharmacy at:    [facility] [place]: [phone]  [facility]: [phone]  [facility]: [phone]

## 2025-02-24 ENCOUNTER — OFFICE VISIT (OUTPATIENT)
Dept: PAIN MEDICINE | Facility: CLINIC | Age: 70
End: 2025-02-24
Payer: MEDICARE

## 2025-02-24 VITALS
HEART RATE: 100 BPM | RESPIRATION RATE: 16 BRPM | OXYGEN SATURATION: 98 % | SYSTOLIC BLOOD PRESSURE: 161 MMHG | DIASTOLIC BLOOD PRESSURE: 104 MMHG

## 2025-02-24 DIAGNOSIS — L40.50 PSORIATIC ARTHRITIS (MULTI): ICD-10-CM

## 2025-02-24 DIAGNOSIS — M54.14 THORACIC RADICULOPATHY: ICD-10-CM

## 2025-02-24 DIAGNOSIS — S32.000A LUMBAR COMPRESSION FRACTURE, CLOSED, INITIAL ENCOUNTER (MULTI): Primary | ICD-10-CM

## 2025-02-24 DIAGNOSIS — M54.16 LUMBAR RADICULOPATHY: ICD-10-CM

## 2025-02-24 DIAGNOSIS — M96.1 POSTLAMINECTOMY SYNDROME, LUMBAR REGION: ICD-10-CM

## 2025-02-24 DIAGNOSIS — M79.18 CERVICAL MYOFASCIAL PAIN SYNDROME: ICD-10-CM

## 2025-02-24 PROCEDURE — 99203 OFFICE O/P NEW LOW 30 MIN: CPT | Performed by: ANESTHESIOLOGY

## 2025-02-24 PROCEDURE — 99213 OFFICE O/P EST LOW 20 MIN: CPT | Performed by: ANESTHESIOLOGY

## 2025-02-24 PROCEDURE — 3077F SYST BP >= 140 MM HG: CPT | Performed by: ANESTHESIOLOGY

## 2025-02-24 PROCEDURE — 4010F ACE/ARB THERAPY RXD/TAKEN: CPT | Performed by: ANESTHESIOLOGY

## 2025-02-24 PROCEDURE — 3080F DIAST BP >= 90 MM HG: CPT | Performed by: ANESTHESIOLOGY

## 2025-02-24 PROCEDURE — 1159F MED LIST DOCD IN RCRD: CPT | Performed by: ANESTHESIOLOGY

## 2025-02-24 PROCEDURE — 1125F AMNT PAIN NOTED PAIN PRSNT: CPT | Performed by: ANESTHESIOLOGY

## 2025-02-24 RX ORDER — HYDROMORPHONE HYDROCHLORIDE 2 MG/1
2 TABLET ORAL 3 TIMES DAILY PRN
Qty: 84 TABLET | Refills: 0 | Status: SHIPPED | OUTPATIENT
Start: 2025-04-27 | End: 2025-05-25

## 2025-02-24 RX ORDER — HYDROMORPHONE HYDROCHLORIDE 2 MG/1
2 TABLET ORAL 3 TIMES DAILY PRN
Qty: 84 TABLET | Refills: 0 | Status: SHIPPED | OUTPATIENT
Start: 2025-03-30 | End: 2025-04-27

## 2025-02-24 RX ORDER — GABAPENTIN 300 MG/1
300 CAPSULE ORAL 2 TIMES DAILY
Qty: 60 CAPSULE | Refills: 2 | Status: SHIPPED | OUTPATIENT
Start: 2025-03-02 | End: 2025-05-31

## 2025-02-24 RX ORDER — HYDROMORPHONE HYDROCHLORIDE 2 MG/1
2 TABLET ORAL 3 TIMES DAILY PRN
Qty: 84 TABLET | Refills: 0 | Status: SHIPPED | OUTPATIENT
Start: 2025-03-02 | End: 2025-03-30

## 2025-02-24 RX ORDER — CYCLOBENZAPRINE HCL 10 MG
10 TABLET ORAL 2 TIMES DAILY PRN
Qty: 60 TABLET | Refills: 2 | Status: SHIPPED | OUTPATIENT
Start: 2025-02-24

## 2025-02-24 ASSESSMENT — PAIN - FUNCTIONAL ASSESSMENT: PAIN_FUNCTIONAL_ASSESSMENT: 0-10

## 2025-02-24 ASSESSMENT — PAIN SCALES - GENERAL: PAINLEVEL_OUTOF10: 4

## 2025-02-24 ASSESSMENT — PAIN DESCRIPTION - DESCRIPTORS: DESCRIPTORS: SHARP;STABBING

## 2025-02-24 ASSESSMENT — ENCOUNTER SYMPTOMS
DEPRESSION: 0
LOSS OF SENSATION IN FEET: 0
OCCASIONAL FEELINGS OF UNSTEADINESS: 1

## 2025-02-24 NOTE — PROGRESS NOTES
History Of Present Illness  Nesha Mcleod is a 70 y.o. female presenting with postlaminectomy syndrome patient has sustained a fall and hit her lower back 2 weeks ago.  Patient reports localized lower back pain that exacerbated with standing and walking and sitting and is relieved by laying down.  Patient did not get any investigative workup for after her fall.  Current pain medications include hydromorphone 2 mg 3 times daily gabapentin 300 mg twice daily and Flexeril 10 mg twice daily.  Patient reports 75% relief of her back pain with current regimen     Past Medical History  She has a past medical history of Anxiety disorder, unspecified, Arthritis, Arthropathic psoriasis, unspecified (Multi), Chronic back pain, DM type 2 (diabetes mellitus, type 2) (Multi), GERD (gastroesophageal reflux disease), Hypertension, Injury of head (05/09/2024), Other chronic pain, Other conditions influencing health status, Personal history of diseases of the skin and subcutaneous tissue, Personal history of other diseases of the musculoskeletal system and connective tissue, and Personal history of other endocrine, nutritional and metabolic disease.    Surgical History  She has a past surgical history that includes Other surgical history; Appendectomy; Knee surgery; Knee surgery; Carpal tunnel release; Breast surgery (Bilateral); Cholecystectomy; Total knee arthroplasty (Left); Other surgical history; Other surgical history; Shoulder surgery; Shoulder surgery; Other surgical history (10/13/2016); Knee arthroscopy w/ debridement; Tonsillectomy; Foot surgery; Knee arthroscopy w/ debridement; Carpal tunnel release; Hysterectomy; Gallbladder surgery; Total knee arthroplasty; Hernia repair; Eye surgery; Neck surgery; Back surgery; Lipectomy (01/2014); Gastric bypass; Lipectomy; Cervical fusion; Esophagogastroduodenoscopy; and Incisional hernia repair (09/24/2020).     Social History  She reports that she has never smoked. She has never  been exposed to tobacco smoke. She has never used smokeless tobacco. Alcohol use questions deferred to the physician. Drug use questions deferred to the physician.    Family History  Family History   Problem Relation Name Age of Onset    Hypertension Mother      Asthma Mother      Arthritis Father      Heart disease Father      Arthritis Brother      Hypertension Brother      Hypertension Brother      Lymphoma Maternal Grandmother      Heart disease Paternal Grandfather          Allergies  Methotrexate; Trazodone; Adhesive tape-silicones; Bleach (sodium hypochlorite); Chlorine; Ciprofloxacin; Citalopram; Codeine; Doxycycline; Latex; Morphine; Penicillins; Soap; Tetracycline; Amitriptyline; Grass pollen; Infliximab; Other; Rubber, unspecified; Trace metals; Weed pollen; Methocarbamol; Nickel; and Sulfa (sulfonamide antibiotics)    Review of systems:   13-point review of systems done and negative except as noted in HPI      Physical Exam   Vital signs: Reviewed  Constitutional: No acute distress, well appearing and well nourished. Patient appears stated age.   Eyes: Conjunctiva non-icteric and eye lids are without obvious rash or drooping. Pupils are symmetric.   Ears, Nose, Mouth, and Throat: External ears and nose appear to be without deformity or rash. No lesions or masses noted. Hearing is grossly intact.   Neck:. No JVD noted, tracheal position is midline.   Head and Face: Examination of the head and face revealed no abnormalities.   Respiratory: No gasping or shortness of breath noted, no use of accessory muscles noted.   Cardiovascular: Examination for edema is normal.   GI: Abdomen nontender to palpation.   Skin: No rashes or open lesions/ulcers identified on skin.   Musk: Digits/nails show no clubbing or cyanosis. No asymmetry or masses noted of the musculature. Examination of the muscles/joints/bones show normal range of motion. Gait is grossly abnormal.  Unable to walk on toes and heels.   Neurologic:  Cranial nerves II-XII intact, motor strength 5/5 muscle strength of the lower extremities bilaterally and equal. 5/5 muscle strength of the upper extremities bilaterally and equal.   Reflexes: normal.   Sensation: Normal to touch pinprick lower extremities  Provocative tests: Tenderness on palpation lumbar spine.       Last Recorded Vitals  BP (!) 161/104 (BP Location: Left arm, Patient Position: Sitting)   Pulse 100   Resp 16   SpO2 98%     Relevant Results            Last OARRS Review: No data recorded    I have personally reviewed the OARRS report for Nesha Mcleod I have considered the risks of abuse, dependence, addiction and diversion  Overdose risk score is 100, morphine milligram equivalent is 28     Assessment/Plan   Diagnoses and all orders for this visit:  Lumbar compression fracture, closed, initial encounter (Multi)  Postlaminectomy syndrome, lumbar region  Cervical myofascial pain syndrome  Psoriatic arthritis (Multi)  Lumbar radiculopathy      Plan  Renew current pain medication  CT scan thoracic and lumbar spine rule out lumbar compression fracture         Jhon Cisneros MD

## 2025-02-26 ENCOUNTER — HOSPITAL ENCOUNTER (OUTPATIENT)
Dept: RADIOLOGY | Facility: CLINIC | Age: 70
Discharge: HOME | End: 2025-02-26
Payer: MEDICARE

## 2025-02-26 PROCEDURE — 72128 CT CHEST SPINE W/O DYE: CPT

## 2025-02-26 PROCEDURE — 72131 CT LUMBAR SPINE W/O DYE: CPT

## 2025-02-27 ENCOUNTER — TELEPHONE (OUTPATIENT)
Dept: PAIN MEDICINE | Facility: CLINIC | Age: 70
End: 2025-02-27
Payer: MEDICARE

## 2025-02-27 NOTE — TELEPHONE ENCOUNTER
Patient had her CT scan yesterday. Let me know if Dr Cisneros will call her with results or if an OV is needed.  Thanks

## 2025-03-04 NOTE — TELEPHONE ENCOUNTER
Dr MCELROY reviewed imaging. Pt does not have any new fractures which was the concern  after her fall. Does not recommend any interventions.  She has already seen a surgeon in the past 6 months for her severe spinal stenosis. She is s/p 7 back surgeries so is going to hold off on considering further surgery

## 2025-03-04 NOTE — TELEPHONE ENCOUNTER
Pt calling again for CT results,please review and advise.She is complaining of worsening weakness and heaviness in legs.

## 2025-03-06 ENCOUNTER — TELEPHONE (OUTPATIENT)
Dept: PAIN MEDICINE | Facility: CLINIC | Age: 70
End: 2025-03-06
Payer: MEDICARE

## 2025-03-06 DIAGNOSIS — M25.552 BILATERAL HIP PAIN: ICD-10-CM

## 2025-03-06 DIAGNOSIS — M25.551 BILATERAL HIP PAIN: ICD-10-CM

## 2025-03-06 NOTE — TELEPHONE ENCOUNTER
Pt requesting bilateral hip and pelvis pain after a fall,she was seen in office and CT lumbar and thoracic spine completed with no evidence of fracture,however, pt is concerned she injured hips and would like xray

## 2025-03-10 ENCOUNTER — HOSPITAL ENCOUNTER (OUTPATIENT)
Dept: RADIOLOGY | Facility: CLINIC | Age: 70
Discharge: HOME | End: 2025-03-10
Payer: MEDICARE

## 2025-03-10 DIAGNOSIS — M25.551 BILATERAL HIP PAIN: ICD-10-CM

## 2025-03-10 DIAGNOSIS — M25.552 BILATERAL HIP PAIN: ICD-10-CM

## 2025-03-10 PROCEDURE — 73523 X-RAY EXAM HIPS BI 5/> VIEWS: CPT | Mod: BILATERAL PROCEDURE | Performed by: RADIOLOGY

## 2025-03-10 PROCEDURE — 73521 X-RAY EXAM HIPS BI 2 VIEWS: CPT

## 2025-03-11 ENCOUNTER — TELEPHONE (OUTPATIENT)
Dept: PAIN MEDICINE | Facility: CLINIC | Age: 70
End: 2025-03-11

## 2025-03-11 ENCOUNTER — APPOINTMENT (OUTPATIENT)
Dept: INFUSION THERAPY | Facility: CLINIC | Age: 70
End: 2025-03-11
Payer: MEDICARE

## 2025-03-11 ENCOUNTER — PREP FOR PROCEDURE (OUTPATIENT)
Dept: PAIN MEDICINE | Facility: CLINIC | Age: 70
End: 2025-03-11

## 2025-03-11 VITALS
SYSTOLIC BLOOD PRESSURE: 145 MMHG | RESPIRATION RATE: 18 BRPM | OXYGEN SATURATION: 96 % | DIASTOLIC BLOOD PRESSURE: 77 MMHG | TEMPERATURE: 98.1 F | HEART RATE: 111 BPM

## 2025-03-11 DIAGNOSIS — M16.12 PRIMARY OSTEOARTHRITIS OF LEFT HIP: ICD-10-CM

## 2025-03-11 DIAGNOSIS — L40.50 PSORIATIC ARTHRITIS (MULTI): ICD-10-CM

## 2025-03-11 DIAGNOSIS — M96.1 POSTLAMINECTOMY SYNDROME, LUMBAR REGION: ICD-10-CM

## 2025-03-11 PROCEDURE — 96523 IRRIG DRUG DELIVERY DEVICE: CPT | Performed by: NURSE PRACTITIONER

## 2025-03-11 RX ORDER — HEPARIN 100 UNIT/ML
500 SYRINGE INTRAVENOUS AS NEEDED
OUTPATIENT
Start: 2025-03-11

## 2025-03-11 RX ORDER — HEPARIN 100 UNIT/ML
500 SYRINGE INTRAVENOUS AS NEEDED
Status: DISCONTINUED | OUTPATIENT
Start: 2025-03-11 | End: 2025-03-11 | Stop reason: HOSPADM

## 2025-03-11 RX ORDER — HEPARIN SODIUM,PORCINE/PF 10 UNIT/ML
50 SYRINGE (ML) INTRAVENOUS AS NEEDED
OUTPATIENT
Start: 2025-03-11

## 2025-03-11 RX ADMIN — HEPARIN 500 UNITS: 100 SYRINGE at 13:48

## 2025-03-11 ASSESSMENT — ENCOUNTER SYMPTOMS
DIZZINESS: 0
EYE PROBLEMS: 0
WOUND: 0
ABDOMINAL PAIN: 0
COUGH: 0
FEVER: 0
LIGHT-HEADEDNESS: 0
MYALGIAS: 0
VOMITING: 0
SORE THROAT: 0
SHORTNESS OF BREATH: 0
VOICE CHANGE: 0
CONSTIPATION: 0
PALPITATIONS: 0
FATIGUE: 0
NAUSEA: 0
LEG SWELLING: 0
FREQUENCY: 0
HEADACHES: 0
DYSURIA: 0
APPETITE CHANGE: 0
ARTHRALGIAS: 1
BLOOD IN STOOL: 0
UNEXPECTED WEIGHT CHANGE: 0
NUMBNESS: 0
HEMATURIA: 0
TROUBLE SWALLOWING: 0
WHEEZING: 0
CHILLS: 0
EXTREMITY WEAKNESS: 0
DIARRHEA: 0

## 2025-03-11 ASSESSMENT — PAIN SCALES - GENERAL: PAINLEVEL_OUTOF10: 10-WORST PAIN EVER

## 2025-03-11 NOTE — PROGRESS NOTES
"[facility]   Infusion Clinic Note   Date: 2025   Name: Nesha Mcleod  : 1955   MRN: [id number]         Reason for Visit: OP Infusion (Port Flush every monthly)         Today: We administered heparin flush.       Ordered By: Sheila Valdes MD       For a Diagnosis of: Psoriatic arthritis (Multi)       At today's visit patient accompanied by: Self      Today's Vitals:   Vitals:    25 1340   BP: 145/77   Pulse: (!) 111   Resp: 18   Temp: 36.7 °C (98.1 °F)   TempSrc: Temporal   SpO2: 96%   PainSc: 10-Worst pain ever   PainLoc: Generalized  Comment: lower back pain and leg pain \"arthritic\"             Pre - Treatment Checklist:      - Previous reaction to current treatment: no      (Assess patient for the concerns below. Document provider notification as appropriate).  - Active or recent infection with/without current antibiotic use: no  - Recent or planned invasive dental work: no  - Recent or planned surgeries: no  - Recently received or plans to receive vaccinations: no  - Has treatment related toxicities: no  - Any chance may be pregnant:  no      Pain: 10   - Is the pain different from normal: no   - Is prescribing Doctor aware:  yes- patient having steroid shot soon      Labs: Reviewed       Fall Risk Screening: Fan Fall Risk  History of Falling, Immediate or Within 3 Months: Yes  Secondary Diagnosis: Yes  Ambulatory Aid: Crutches/cane/walker  Intravenous Therapy/Heparin Lock: No  Gait/Transferring: Normal/bedrest/immobile  Mental Status: Oriented to own ability  Fan Fall Risk Score: 55       Review Of Systems:  Review of Systems   Constitutional:  Negative for appetite change, chills, fatigue, fever and unexpected weight change.   HENT:   Negative for hearing loss, mouth sores, sore throat, tinnitus, trouble swallowing and voice change.    Eyes:  Negative for eye problems.   Respiratory:  Negative for cough, shortness of breath and wheezing.    Cardiovascular: "  Negative for chest pain, leg swelling and palpitations.   Gastrointestinal:  Negative for abdominal pain, blood in stool, constipation, diarrhea, nausea and vomiting.   Genitourinary:  Negative for dysuria, frequency and hematuria.    Musculoskeletal:  Positive for arthralgias. Negative for myalgias.   Skin:  Negative for itching, rash and wound.   Neurological:  Negative for dizziness, extremity weakness, headaches, light-headedness and numbness.         Infusion Readiness:  - Assessment Concerns Related to Infusion: No  - Provider notified: n/a      New Patient Education:    N/A (returning patient for continuation of therapy. Ongoing education provided as needed.)        Treatment Conditions & Drug Specific Questions:    NOT APPLICABLE        Weight Based Drug Calculations:    WEIGHT BASED DRUGS: NOT APPLICABLE / FLAT DOSE       Post Treatment: Patient tolerated treatment without issue and was discharged in no apparent distress.      Note Authored / Patient Cared for By: Rosalio Preciado RN   _________________________________________________________________________    Note authored and patient cared for by: Rosalio Preciado RN  Note/Encounter reviewed by: Yaa CORDON NP. This provider on site at time of patient encounter. Staff to notify this provider of any questions, concerns, abnormals or issues during encounter.

## 2025-03-11 NOTE — TELEPHONE ENCOUNTER
----- Message from Jhon Kanazarfa sent at 3/10/2025  2:19 PM EDT -----  X-ray of bilateral hips showed severe osteoarthritis.  Recommend intra-articular steroid injection of hip  
Dr Cisneros reviewed hip xrays and recommending hip injection. Patients worst pain is left hip. She would like to proceed with injection. Pre injection instructions given  
Female

## 2025-03-11 NOTE — PATIENT INSTRUCTIONS
Today :We administered heparin flush.     For:   1. Psoriatic arthritis (Multi)         Your next appointment is due in:  one month        Please read the  Medication Guide that was given to you and reviewed during todays visit.     (Tell all doctors including dentists that you are taking this medication)     Go to the emergency room or call 911 if:  -You have signs of allergic reaction:   -Rash, hives, itching.   -Swollen, blistered, peeling skin.   -Swelling of face, lips, mouth, tongue or throat.   -Tightness of chest, trouble breathing, swallowing or talking     Call your doctor:  - If IV / injection site gets red, warm, swollen, itchy or leaks fluid or pus.     (Leave dressing on your IV site for at least 2 hours and keep area clean and dry  - If you get sick or have symptoms of infection or are not feeling well for any reason.    (Wash your hands often, stay away from people who are sick)  - If you have side effects from your medication that do not go away or are bothersome.     (Refer to the teaching your nurse gave you for side effects to call your doctor about)    - Common side effects may include:  stuffy nose, headache, feeling tired, muscle aches, upset stomach  - Before receiving any vaccines     - Call the [facility] at   If:  - You get sick, are on antibiotics, have had a recent vaccine, have surgery or dental work and your doctor wants your visit rescheduled.  - You need to cancel and reschedule your visit for any reason. Call at least 2 days before your visit if you need to cancel.   - Your insurance changes before your next visit.    (We will need to get approval from your new insurance. This can take up to two weeks.)     [facility] is opened Monday thru Friday. We are closed on weekends and holidays.   Voice mail will take your call if the center is closed. If you leave a message please allow 24 hours for a call back during weekdays. If you leave a message on  a weekend/holiday, we will call you back the next business day.    A pharmacist is available Monday - Friday from 8:30AM to 3:30PM to help answer any questions you may have about your prescriptions(s). Please call pharmacy at:    [facility]: [phone]  [facility]: [phone]  [facility]: [phone]

## 2025-03-13 ENCOUNTER — TELEPHONE (OUTPATIENT)
Dept: PRIMARY CARE | Facility: CLINIC | Age: 70
End: 2025-03-13

## 2025-03-13 ENCOUNTER — HOSPITAL ENCOUNTER (OUTPATIENT)
Dept: PAIN MEDICINE | Facility: CLINIC | Age: 70
Discharge: HOME | End: 2025-03-13
Payer: MEDICARE

## 2025-03-13 VITALS
OXYGEN SATURATION: 94 % | RESPIRATION RATE: 16 BRPM | SYSTOLIC BLOOD PRESSURE: 127 MMHG | HEART RATE: 111 BPM | DIASTOLIC BLOOD PRESSURE: 69 MMHG

## 2025-03-13 DIAGNOSIS — M48.062 NEUROGENIC CLAUDICATION DUE TO LUMBAR SPINAL STENOSIS: ICD-10-CM

## 2025-03-13 DIAGNOSIS — M16.12 PRIMARY OSTEOARTHRITIS OF LEFT HIP: ICD-10-CM

## 2025-03-13 DIAGNOSIS — M96.1 POSTLAMINECTOMY SYNDROME, LUMBAR REGION: ICD-10-CM

## 2025-03-13 PROCEDURE — 77002 NEEDLE LOCALIZATION BY XRAY: CPT | Performed by: ANESTHESIOLOGY

## 2025-03-13 PROCEDURE — 2500000004 HC RX 250 GENERAL PHARMACY W/ HCPCS (ALT 636 FOR OP/ED): Performed by: ANESTHESIOLOGY

## 2025-03-13 PROCEDURE — 2550000001 HC RX 255 CONTRASTS: Performed by: ANESTHESIOLOGY

## 2025-03-13 RX ORDER — DULOXETIN HYDROCHLORIDE 30 MG/1
30 CAPSULE, DELAYED RELEASE ORAL DAILY
Qty: 30 CAPSULE | Refills: 11 | Status: SHIPPED | OUTPATIENT
Start: 2025-03-13 | End: 2026-03-13

## 2025-03-13 RX ORDER — INDOMETHACIN 25 MG/1
CAPSULE ORAL AS NEEDED
Status: COMPLETED | OUTPATIENT
Start: 2025-03-13 | End: 2025-03-13

## 2025-03-13 RX ORDER — DIAZEPAM 5 MG/1
TABLET ORAL
Qty: 4 TABLET | Refills: 0 | Status: SHIPPED | OUTPATIENT
Start: 2025-03-13

## 2025-03-13 RX ORDER — TRIAMCINOLONE ACETONIDE 40 MG/ML
INJECTION, SUSPENSION INTRA-ARTICULAR; INTRAMUSCULAR AS NEEDED
Status: COMPLETED | OUTPATIENT
Start: 2025-03-13 | End: 2025-03-13

## 2025-03-13 RX ORDER — BUPIVACAINE HYDROCHLORIDE 5 MG/ML
INJECTION, SOLUTION EPIDURAL; INTRACAUDAL AS NEEDED
Status: COMPLETED | OUTPATIENT
Start: 2025-03-13 | End: 2025-03-13

## 2025-03-13 RX ORDER — LIDOCAINE HYDROCHLORIDE 5 MG/ML
INJECTION, SOLUTION INFILTRATION; INTRAVENOUS AS NEEDED
Status: COMPLETED | OUTPATIENT
Start: 2025-03-13 | End: 2025-03-13

## 2025-03-13 RX ADMIN — IOHEXOL 5 ML: 240 INJECTION, SOLUTION INTRATHECAL; INTRAVASCULAR; INTRAVENOUS; ORAL at 11:20

## 2025-03-13 RX ADMIN — BUPIVACAINE HYDROCHLORIDE 4 ML: 5 INJECTION, SOLUTION EPIDURAL; INTRACAUDAL; PERINEURAL at 11:20

## 2025-03-13 RX ADMIN — LIDOCAINE HYDROCHLORIDE 9 ML: 5 INJECTION, SOLUTION INFILTRATION at 11:20

## 2025-03-13 RX ADMIN — TRIAMCINOLONE ACETONIDE 40 MG: 40 INJECTION, SUSPENSION INTRA-ARTICULAR; INTRAMUSCULAR at 11:20

## 2025-03-13 RX ADMIN — SODIUM BICARBONATE 1 MEQ: 84 INJECTION, SOLUTION INTRAVENOUS at 11:20

## 2025-03-13 ASSESSMENT — PAIN DESCRIPTION - DESCRIPTORS: DESCRIPTORS: ACHING

## 2025-03-13 ASSESSMENT — PAIN SCALES - GENERAL: PAINLEVEL_OUTOF10: 10 - WORST POSSIBLE PAIN

## 2025-03-13 ASSESSMENT — PAIN - FUNCTIONAL ASSESSMENT: PAIN_FUNCTIONAL_ASSESSMENT: 0-10

## 2025-03-13 NOTE — DISCHARGE INSTRUCTIONS
[facility]  [facility] [address]  [address] [id number] [phone]    POST PROCEDURE INSTRUCTIONS    Activity  Medication used during your procedure may cause some temporary weakness or numbness in your arms or legs, depending on the type of injection you received. It is recommended that you do not drive or operate machinery the day of your injection.  You do not need to stay in bed when you get home. You should be able to resume your normal activities, including work. However, any pre-existing physical restriction you had prior to the procedure may still remain.   Have a responsible adult with you if you received sedation for the procedure. Do not drive or operate machinery for 12 hours.    Pain  Immediate pain relief from the local anesthetic will wear off after several hours.  Prolonged relief from the steroid may take 3-14 days to occur.  Some patients may experience a “flare up” of their normal pain for a few days after the procedure. You may apply ice packs to the sore area for 15minutes on/ 2 hours off and take your prescribed or over the counter analgesics as needed.  Common side effects from the steroid include facial flushing, headaches,fluid retention,trouble sleeping,and cold like symptoms for 24-48 hours post procedure.  Patients receiving diagnostic injections (no steroid): pain relief is intended to be temporary. Pay attention to the percentage of pain relief that occurs compared to before your injection so you can report this to your doctor. Pain scores before and after the procedure need to be documented.    Medications  Resume your normal medications unless otherwise instructed  If you held your blood thinning medication for the procedure,you will be instructed on when to restart.    Injection site care  Keep the injection site clean and dry. You may shower and remove the Band Aid after you arrive home.  If you notice excessive  bleeding (slow general oozing that completely soaks the dressing or fresh bright red bleeding),apply pressure and call our office immediately.  Observe for signs of infection: increasing pain at injection site, redness, swelling, drainage with a foul smell, any associated fever or chills                        If you notice any of these, call our office immediately.    Diabetic patients   You may notice an elevation in blood sugar if steroid is used. Notify your primary care doctor if blood sugar levels do not return to normal.    Follow up  Make a virtual injection follow up appointment  with Dr. Cisneros in 3-4 weeks      Call our office at [phone] to speak to the clinical staff with any concerns or problems.  Go to the nearest emergency room if you are not able to reach us and your problem is urgent.  Call [phone] if you develop serious symptoms such as: chest pain or difficulty breathing.

## 2025-03-13 NOTE — H&P
Pain Management H&P    History Of Present Illness  Nesha Mcleod is a 70 y.o. female presents for procedure state below. Endorses no changes in past medical history or medical health since last seen in clinic.     ASA: 2     Past Medical History  She has a past medical history of Anxiety disorder, unspecified, Arthritis, Arthropathic psoriasis, unspecified (Multi), Chronic back pain, DM type 2 (diabetes mellitus, type 2) (Multi), GERD (gastroesophageal reflux disease), Hypertension, Injury of head (05/09/2024), Other chronic pain, Other conditions influencing health status, Personal history of diseases of the skin and subcutaneous tissue, Personal history of other diseases of the musculoskeletal system and connective tissue, and Personal history of other endocrine, nutritional and metabolic disease.    Surgical History  She has a past surgical history that includes Other surgical history; Appendectomy; Knee surgery; Knee surgery; Carpal tunnel release; Breast surgery (Bilateral); Cholecystectomy; Total knee arthroplasty (Left); Other surgical history; Other surgical history; Shoulder surgery; Shoulder surgery; Other surgical history (10/13/2016); Knee arthroscopy w/ debridement; Tonsillectomy; Foot surgery; Knee arthroscopy w/ debridement; Carpal tunnel release; Hysterectomy; Gallbladder surgery; Total knee arthroplasty; Hernia repair; Eye surgery; Neck surgery; Back surgery; Lipectomy (01/2014); Gastric bypass; Lipectomy; Cervical fusion; Esophagogastroduodenoscopy; and Incisional hernia repair (09/24/2020).     Social History  She reports that she has never smoked. She has never been exposed to tobacco smoke. She has never used smokeless tobacco. Alcohol use questions deferred to the physician. Drug use questions deferred to the physician.    Family History  Family History   Problem Relation Name Age of Onset    Hypertension Mother      Asthma Mother      Arthritis Father      Heart disease Father      Arthritis  Brother      Hypertension Brother      Hypertension Brother      Lymphoma Maternal Grandmother      Heart disease Paternal Grandfather          Allergies  Methotrexate; Trazodone; Adhesive tape-silicones; Bleach (sodium hypochlorite); Chlorine; Ciprofloxacin; Citalopram; Codeine; Doxycycline; Latex; Morphine; Penicillins; Soap; Tetracycline; Amitriptyline; Grass pollen; Infliximab; Other; Rubber, unspecified; Trace metals; Weed pollen; Methocarbamol; Nickel; and Sulfa (sulfonamide antibiotics)    Review of Symptoms:   Constitutional: Negative for chills, diaphoresis or fever  HENT: Negative for neck swelling  Eyes:.  Negative for eye pain  Respiratory:.  Negative for cough, shortness of breath or wheezing    Cardiovascular:.  Negative for chest pain or palpitations  Gastrointestinal:.  Negative for abdominal pain, nausea and vomiting  Genitourinary:.  Negative for urgency  Musculoskeletal:  Positive for back pain. Positive for joint pain. Denies falls within the past 3 months.  Skin: Negative for wounds or itching   Neurological: Negative for dizziness, seizures, loss of consciousness and weakness  Endo/Heme/Allergies: Does not bruise/bleed easily  Psychiatric/Behavioral: Negative for depression. The patient does not appear anxious.       PHYSICAL EXAM  Vitals signs reviewed  Constitutional:       General: Not in acute distress     Appearance: Normal appearance. Not ill-appearing.  HENT:     Head: Normocephalic and atraumatic  Eyes:     Conjunctiva/sclera: Conjunctivae normal  Cardiovascular:     Rate and Rhythm: Normal rate and regular rhythm  Pulmonary:     Effort: No respiratory distress  Abdominal:     Palpations: Abdomen is soft  Musculoskeletal: ZAFAR  Skin:     General: Skin is warm and dry  Neurological:     General: No focal deficit present  Psychiatric:         Mood and Affect: Mood normal         Behavior: Behavior normal     Last Recorded Vitals  There were no vitals taken for this visit.    Relevant  Results  Current Outpatient Medications   Medication Instructions    acetaminophen (TYLENOL) 650 mg, Every 4 hours PRN    aspirin 81 mg, Daily    atorvastatin (LIPITOR) 40 mg, oral, Daily    buPROPion SR (WELLBUTRIN SR) 100 mg, oral, Daily, DO NOT CRUSH CHEW OR SPLIT    cinnamon bark extract 500 mg tablet Cinnamon 500 MG as directed Orally daily Active    cyclobenzaprine (FLEXERIL) 10 mg, oral, 2 times daily PRN    furosemide (LASIX) 40 mg, oral, Daily    gabapentin (NEURONTIN) 300 mg, oral, 2 times daily    HYDROmorphone (DILAUDID) 2 mg, oral, 3 times daily PRN    [START ON 3/30/2025] HYDROmorphone (DILAUDID) 2 mg, oral, 3 times daily PRN    [START ON 4/27/2025] HYDROmorphone (DILAUDID) 2 mg, oral, 3 times daily PRN    leflunomide (ARAVA) 10 mg, oral, Daily    levothyroxine (SYNTHROID, LEVOXYL) 75 mcg, oral, Daily before breakfast    lisinopril 20 mg, oral, Daily    magnesium oxide (MAG-OX) 250 mg, Daily    metFORMIN (GLUCOPHAGE) 500 mg, oral, 2 times daily (morning and late afternoon)    metFORMIN (GLUCOPHAGE) 500 mg, oral, 2 times daily (morning and late afternoon)    multivit-minerals/folic acid (CENTRUM ADULTS ORAL) Daily    naloxone (NARCAN) 4 mg, nasal, As needed, May repeat every 2-3 minutes if needed, alternating nostrils, until medical assistance becomes available.    OneTouch Ultra Test strip 1 strip, Topical, Daily    potassium gluconate 595 mg (99 mg) tablet 1 tablet, Daily    sodium chloride 0.9% parenteral solution with golimumab 12.5 mg/mL solution 2 mg/kg 2 mg/kg, Every 8 weeks    Tresiba FlexTouch U-100 8 Units, subcutaneous, Every morning         XR hip right with pelvis when performed 2 or 3 views 03/12/2024    Narrative  Interpreted By:  Schoenberger, Joseph,  STUDY:  XR HIP RIGHT WITH PELVIS WHEN PERFORMED 2 OR 3 VIEWS; ;  3/12/2024  8:47 am    INDICATION:  Signs/Symptoms:pain.    COMPARISON:  None.    ACCESSION NUMBER(S):  [id number]    ORDERING CLINICIAN:  DORIS SANTOS    FINDINGS:  There  are calcifications of the superolateral acetabular labrum. Mild  degenerative changes in the hip with some narrowing and mild  spurring. No fracture or dislocation.    Impression  No definite acute findings. See discussion above.      MACRO:  None    Signed by: Joseph Schoenberger 3/13/2024 9:09 AM  Dictation workstation:   [id number]      XR hip right 2 or 3 views     Narrative  PROCEDURE:         HIP RT 2 VIEW W OR WO AP PELVIS - IXR  0057  REASON FOR EXAM: hip pain    RESULT: MRN: [id number]  Patient Name: LIZZIE PHILLIPS    STUDY:  HIP RT 2 VIEW W OR WO AP PELVIS 9/5/2023 10:55 am    INDICATION:  Right hip pain with history of fall 1 week ago    COMPARISON:  None available.    ACCESSION NUMBER(S):  [id number]    ORDERING CLINICIAN:  KATYH LOBO    TECHNIQUE:  AP view of the pelvis with AP and lateral views of the right hip.    FINDINGS:  No fracture or dislocation of the right hip is demonstrated. There is no  acute bony abnormality of the pelvis. There is chondrocalcinosis of the hips  bilaterally. Arterial calcification is also present. There are pelvic  phleboliths identified.    Impression  No fracture or dislocation of the right hip with no pelvic fracture  identified.    Chondrocalcinosis of the hips bilaterally.  Dictation workstation:   [id number]    Original Interpreting Physician:   LATOSHA FRANCOIS M.D.  Original Transcribed by/Date: MMODAL Sep  5 2023  9:48A  Original Electronically Signed by/Date: LATOSHA FRANCOIS M.D. Sep  5 2023 11:03A    Addendum Interpreting Physician:  Addendum Transcribed by/Date: NO ADDENDUM  Addendum Electronically Signed by/Date:      MR lumbar spine wo IV contrast     Narrative  PROCEDURE:         SPINE LUMBAR WO CONTRAST - CMR  2010  REASON FOR EXAM: SPINAL STENOSIS OF LUMBAR REGION WITH NEUROGENIC  CLAUDICATION    RESULT: MRN: [id number]  Patient Name: LIZZIE PHILLIPS    STUDY:  SPINE LUMBAR WO CONTRAST; 5/23/2023 1:16 pm    INDICATION:  SPINAL STENOSIS OF LUMBAR REGION WITH NEUROGENIC  CLAUDICATION. 68-year-old  woman with chronic lower back pain and bilateral leg pain    COMPARISON:  Lumbar spine MRI 04/20/2021.    ACCESSION NUMBER(S):  [id number]    ORDERING CLINICIAN:  APRIL FLORES    TECHNIQUE:  Multiecho and multiplanar imaging of the lumbar spine was performed without  IV contrast.    FINDINGS:  Redemonstrated levoscoliosis of the lumbar spine. Interbody fusion of the L3  and L4 vertebral bodies again noted with laminectomy changes at L2, L3, and  L5 again seen.    Moderate to severe disc space narrowing is again noted at L1-L2, and L2-L3.      Alignment: There is grade 1 anterolisthesis of L5 on S1. There is grade 1  retrolisthesis of L2 on L3.      Bone marrow signal: Redemonstrated heterogeneous bone marrow signal, which  could be related to marrow reconversion process. Redemonstrated slightly  increased STIR signal within the L2 vertebral body, which may reflect  reactive bone marrow edema in the setting of degenerative change.      Conus: The conus medullaris terminates at the level of L1.      Paraspinal Soft tissues: Postsurgical changes in the posterior lumbar soft  tissues again noted.      Imaged Abdomen: Millimetric T2 hyperintense, T1 hypointense right and left  renal cysts are seen.      T12-L1: Moderate left paracentral disc bulge again noted causing effacement  of the left side of the ventral thecal sac. No significant spinal canal or  neural foraminal narrowing.      L1-2: Mild disc bulge with facet arthropathy is again noted, resulting in  mild spinal canal narrowing. There is also mild bilateral neural foraminal  narrowing, more pronounced on the right.      L2-3: Laminectomy changes again noted. There is broad-based disc osteophyte  with facet hypertrophy resulting in severe spinal canal stenosis. Severe  bilateral neural foraminal narrowing is identified with likely contact and  impingement upon the exiting bilateral nerve roots.      L3-4: Redemonstrated interbody  fusion/ankylosis of the L3-L4 vertebral  bodies with laminectomy changes noted. Mild facet joint arthropathy. No  significant spinal canal narrowing. Mild right neural foraminal narrowing.      L4-5: Spondylolisthesis with broad-based disc protrusion, facet hypertrophy,  and mild ligamentum flavum hypertrophy results in mild spinal canal  narrowing. There is also moderate right and mild-to-moderate left neural  foraminal narrowing.      L5-S1: Laminectomy changes again noted. No sign of spinal canal narrowing.  There is moderate facet joint hypertrophy. There is mild-to-moderate left  and mild right neural foraminal narrowing.    Impression  1. Redemonstrated postsurgical and degenerative changes of the lumbar spine,  with multilevel neural foraminal narrowing and spinal canal narrowing.    2. At L2-L3, there is severe bilateral neural foraminal narrowing and severe  spinal canal stenosis.    3. Please see above for full description of findings at each individual disc  level.    4. Redemonstrated heterogeneous bone marrow signal, as can be seen with  marrow reconversion process. Please correlate with appropriate clinical and  laboratory data.      Dictation workstation:   [id number]    Original Interpreting Physician:   SHAQ SOLO MD  Original Transcribed by/Date: MMODAL May 23 2023 12:15P  Original Electronically Signed by/Date: SHAQ SOLO MD May 23 2023  2:52P    Addendum Interpreting Physician:  Addendum Transcribed by/Date: NO ADDENDUM  Addendum Electronically Signed by/Date:      XR cervical spine 2-3 views     Narrative  PROCEDURE:         SPINE CERVICAL 2 OR 3 VIEWS - IXR  0029  REASON FOR EXAM: RADICULOPATHY, CERVICAL REGION    RESULT: MRN: [id number]  Patient Name: LIZZIE PHILLIPS    STUDY:  SPINE CERVICAL 2 OR 3 VIEWS; 8/24/2022 12:11 pm    INDICATION:  RADICULOPATHY, CERVICAL REGION. History of cervical fracture.    COMPARISON:  08/02/2022    ACCESSION NUMBER(S):  [id number]    ORDERING  CLINICIAN:  WENDY YAÑEZ    TECHNIQUE:  Cervical spine AP, lateral, and swimmer's view.    FINDINGS:  The previously identified odontoid fracture is difficult to appreciate on  plain film study. There is an anterior screw and plate fusion of C5 and C6.  There is a grade 1 anterior slippage of C3 relative to C4 measuring 4 mm.  Anterior marginal spurring is present at C3-4. C7 is visualized on limited  fashion. Swimmer's projections did not help due to the presence of overlap  from the patient's bilateral shoulder prostheses.    Impression  Odontoid fractures difficult to appreciate on plain film study. Cervical  spondylosis and cervical fusion as described above. Findings are similar  compared to the previous studies from June of 2022 and August 2022.  Dictation workstation:   [id number]    Original Interpreting Physician:   HAWK CUMMINGS M.D.  Original Transcribed by/Date: MMODAL Aug 24 2022 11:54A  Original Electronically Signed by/Date: HAWK CUMMINGS M.D. Aug 24 2022 12:53P    Addendum Interpreting Physician:  Addendum Transcribed by/Date: NO ADDENDUM  Addendum Electronically Signed by/Date:      XR cervical spine 2-3 views 08/02/2022    Narrative  MRN: [id number]  Patient Name: LIZZIE PHILLIPS    STUDY:  SPINE, CERVICAL, 2 OR 3 VIEWS; ;  8/2/2022 8:15 am    INDICATION:  CERVICAL PAIN  M54.2: Cervical pain.    COMPARISON:  06/27/2022    ACCESSION NUMBER(S):  [id number]    ORDERING CLINICIAN:  LEONILA WHARTON    FINDINGS:  Stable right central venous catheter. Postoperative changes C5-C6  ACDF. Atlantoaxial interval is maintained. Irregularity along the C2  odontoid base compatible better evaluated on the CT from 06/27/2022.  No fracture or traumatic malalignment. Mild multilevel degenerative  changes of the cervical spine. Precervical soft tissue planes are  maintained. Imaged lung fields are clear.    Impression  No acute fracture or traumatic malalignment.  Stable CT odontoid irregularity likely secondary to  history of  rheumatoid arthritis.  Stable postoperative changes C5-C6 ACDF.  Mild multilevel degenerative changes of the cervical spine.      XR CERVICAL SPINE 2-3 VIEWS 06/27/2022    Narrative  MRN: [id number]  Patient Name: NESHA MCLEOD    STUDY:  Cervical spine x-rays    INDICATION:  fx    COMPARISON:  X-ray cervical spine 08/24/2021  MRI cervical spine 05/28/2020    ACCESSION NUMBER(S):  [id number]    ORDERING CLINICIAN:  GAYE SKELTON    TECHNIQUE:  Four views of the cervical spine    FINDINGS:  Limited exam given nonvisualization of the lower cervical spine on  lateral view.    Alignment: Minimal anterior subluxation of C3-4, degenerative.    Bones: Minimal multilevel loss of vertebral body height. ACDF of  C5-C6.    Disc spaces: Moderate spondylosis.    Soft tissues: Right-sided chest port. Shoulder arthroplasty.    Impression  1.  Limited exam.  2. No acute finding.     No image results found.       No diagnosis found.     ASSESSMENT/PLAN  Nesha Mcleod is a 70 y.o. female presenting for left hip injection/aspiration     (We will consider caudal epidural steroid injection in the future)    Patient denies any recent antibiotic use or infections, denies any blood thinner use, and denies contrast or local anesthetic allergies     Risks, benefits, alternatives discussed. All questions answered to the best of my ability. Patient agrees to proceed.      Our plan is as follows:  - Proceed with aforementioned procedure          Brandyn Davila DO   Pain fellow

## 2025-03-13 NOTE — TELEPHONE ENCOUNTER
Patient returning call to office, address and insurance confirmed.  COVID Screening negative, verbal consent obtained via phone with patient.  Appointment confirmed.

## 2025-03-13 NOTE — ADDENDUM NOTE
Encounter addended by: Brandyn Davila DO on: 3/13/2025 1:45 PM   Actions taken: Clinical Note Signed

## 2025-03-13 NOTE — TELEPHONE ENCOUNTER
Per Dr. Cisneros's request, add duloxetine 30 mg every day.  [facility].    Send valium for caudal epidural injection.

## 2025-03-14 ENCOUNTER — OFFICE VISIT (OUTPATIENT)
Dept: PRIMARY CARE | Facility: CLINIC | Age: 70
End: 2025-03-14
Payer: MEDICARE

## 2025-03-14 VITALS
SYSTOLIC BLOOD PRESSURE: 126 MMHG | RESPIRATION RATE: 20 BRPM | OXYGEN SATURATION: 98 % | HEART RATE: 103 BPM | TEMPERATURE: 99.4 F | DIASTOLIC BLOOD PRESSURE: 60 MMHG

## 2025-03-14 DIAGNOSIS — F41.9 ANXIETY: ICD-10-CM

## 2025-03-14 DIAGNOSIS — E11.9 TYPE 2 DIABETES MELLITUS WITHOUT COMPLICATION, WITH LONG-TERM CURRENT USE OF INSULIN (MULTI): ICD-10-CM

## 2025-03-14 DIAGNOSIS — Z79.4 TYPE 2 DIABETES MELLITUS WITHOUT COMPLICATION, WITH LONG-TERM CURRENT USE OF INSULIN (MULTI): ICD-10-CM

## 2025-03-14 DIAGNOSIS — I10 ESSENTIAL (PRIMARY) HYPERTENSION: ICD-10-CM

## 2025-03-14 DIAGNOSIS — E78.2 MIXED HYPERLIPIDEMIA: Primary | ICD-10-CM

## 2025-03-14 DIAGNOSIS — L40.50 PSORIATIC ARTHRITIS (MULTI): ICD-10-CM

## 2025-03-14 DIAGNOSIS — E06.3 AUTOIMMUNE THYROIDITIS: ICD-10-CM

## 2025-03-14 PROCEDURE — 3078F DIAST BP <80 MM HG: CPT | Performed by: NURSE PRACTITIONER

## 2025-03-14 PROCEDURE — 1125F AMNT PAIN NOTED PAIN PRSNT: CPT | Performed by: NURSE PRACTITIONER

## 2025-03-14 PROCEDURE — 3074F SYST BP LT 130 MM HG: CPT | Performed by: NURSE PRACTITIONER

## 2025-03-14 PROCEDURE — 4010F ACE/ARB THERAPY RXD/TAKEN: CPT | Performed by: NURSE PRACTITIONER

## 2025-03-14 PROCEDURE — 1160F RVW MEDS BY RX/DR IN RCRD: CPT | Performed by: NURSE PRACTITIONER

## 2025-03-14 PROCEDURE — 99349 HOME/RES VST EST MOD MDM 40: CPT | Performed by: NURSE PRACTITIONER

## 2025-03-14 PROCEDURE — 1159F MED LIST DOCD IN RCRD: CPT | Performed by: NURSE PRACTITIONER

## 2025-03-14 ASSESSMENT — ENCOUNTER SYMPTOMS
WEAKNESS: 1
CONSTIPATION: 1
ARTHRALGIAS: 1
EYES NEGATIVE: 1
APPETITE CHANGE: 1
FREQUENCY: 1
HEADACHES: 1
NECK STIFFNESS: 1
SHORTNESS OF BREATH: 0
BACK PAIN: 1
COUGH: 0
HEMATOLOGIC/LYMPHATIC NEGATIVE: 1
MYALGIAS: 1
NECK PAIN: 1
RESPIRATORY NEGATIVE: 1
ACTIVITY CHANGE: 1
JOINT SWELLING: 1
FATIGUE: 1
PSYCHIATRIC NEGATIVE: 1
ALLERGIC/IMMUNOLOGIC NEGATIVE: 1

## 2025-03-14 ASSESSMENT — PAIN SCALES - GENERAL: PAINLEVEL_OUTOF10: 10-WORST PAIN EVER

## 2025-03-14 NOTE — PROGRESS NOTES
Subjective   Patient ID: Nesha Mcleod is a 70 y.o. female who is being seen for routine 2 month house calls follow up.     HPI Pt seen in single family apartment unaccompanied. PMHx: Chronic pain, DDD, HTN, HLD, DM, OA,  Hypothyroid, Psoriatic arthritis, GERD. Pt seen lying back in recliner with legs elevated. Pt with c/o severe back and leg pain. Pt has been incapicated the last 6 weeks due to back pain. Pt saw pain management yesterday who did a injection into left hip yesterday and will be having injections into back in April. Pt diagnosed with severe arthritis into bilat hips.  Pt having new lift chair delivered today since pt has been sleeping in recliner and it is not conducive for sleeping. Pt reports energy as not having any and has been relying on neighbors to help her out the last several weeks since she hasn't been able to do anything around her apartment. Pt reports appetite has been going down since she hasn't been able to cook. Pt no longer getting meals on wheels. Pt sleep reported as poor as she gets up about 5 times a night to urinate. Blood sugars have been running good but pt unsure of numbers states she can't remember. Pt denies fever, chills, night sweats, or dizziness. Pt with c/o headache yesterday. Pt denies trouble chewing or swallowing. Pt denies SOB, cough, CP or palpitations. Denies N/V/D admits to constipation with a bowel movement daily. Admits to urinary frequency and urgency. Pt admits to all over joint pain with severe pain in legs today. Pt has taken pain pill and muscle relaxant today with no relief. Py with no other complaints or concerns today.   Home Visit medically necessary due to: pt has chronic condition that makes access to a traditional office visit very difficult, illness or condition that results in activity limitation or restriction that impacts the ability to leave home such as; unsteady gait/ poor condition.      Review of Systems   Constitutional:  Positive for  activity change, appetite change and fatigue.   HENT: Negative.     Eyes: Negative.    Respiratory: Negative.  Negative for cough and shortness of breath.    Cardiovascular:  Positive for leg swelling. Negative for chest pain.   Gastrointestinal:  Positive for constipation.   Endocrine: Positive for cold intolerance.   Genitourinary:  Positive for frequency and urgency.   Musculoskeletal:  Positive for arthralgias, back pain, gait problem, joint swelling, myalgias, neck pain and neck stiffness.   Skin: Negative.    Allergic/Immunologic: Negative.    Neurological:  Positive for weakness and headaches.   Hematological: Negative.    Psychiatric/Behavioral: Negative.         Objective   /60 (BP Location: Left arm, Patient Position: Sitting, BP Cuff Size: Adult)   Pulse 103   Temp 37.4 °C (99.4 °F) (Temporal)   Resp 20   SpO2 98%       Current Outpatient Medications:     acetaminophen (Tylenol) 325 mg tablet, Take 2 tablets (650 mg) by mouth every 4 hours if needed for mild pain (1 - 3), headaches or fever (temp greater than 38.0 C). THIS ORDER IS SUPERSEDED BY ANY OTHER MEDICATION ORDER FOR MILD PAIN (1-3). MAY BE USED FOR A HIGHER PAIN SCORE IF PATIENT REQUESTS, Disp: , Rfl:     aspirin 81 mg chewable tablet, Chew 1 tablet (81 mg) once daily. HOLD IF GIVEN IN LAST 24 HOURS OR IF HISTORY OF HYPERSENSITIVITY., Disp: , Rfl:     atorvastatin (Lipitor) 40 mg tablet, TAKE 1 TABLET BY MOUTH ONCE  DAILY, Disp: 90 tablet, Rfl: 3    buPROPion SR (Wellbutrin SR) 100 mg 12 hr tablet, TAKE 1 TABLET BY MOUTH ONCE  DAILY DO NOT CRUSH, CHEW, OR  SPLIT, Disp: 90 tablet, Rfl: 3    cinnamon bark extract 500 mg tablet, Cinnamon 500 MG as directed Orally daily Active, Disp: , Rfl:     cyclobenzaprine (Flexeril) 10 mg tablet, Take 1 tablet (10 mg) by mouth 2 times a day as needed for muscle spasms., Disp: 60 tablet, Rfl: 2    diazePAM (Valium) 5 mg tablet, TAKE 1-2 TABS PO ONE HOUR PRIOR TO PROCEDURE. MAY REPEAT UPON ARRIVAL TO  PROCEDURE, Disp: 4 tablet, Rfl: 0    DULoxetine (Cymbalta) 30 mg DR capsule, Take 1 capsule (30 mg) by mouth once daily. Do not crush or chew., Disp: 30 capsule, Rfl: 11    furosemide (Lasix) 40 mg tablet, TAKE 1 TABLET BY MOUTH ONCE  DAILY, Disp: 90 tablet, Rfl: 3    gabapentin (Neurontin) 300 mg capsule, Take 1 capsule (300 mg) by mouth 2 times a day. Do not fill before March 2, 2025., Disp: 60 capsule, Rfl: 2    HYDROmorphone (Dilaudid) 2 mg tablet, Take 1 tablet (2 mg) by mouth 3 times a day as needed for severe pain (7 - 10) for up to 28 days. Do not fill before March 2, 2025., Disp: 84 tablet, Rfl: 0    [START ON 3/30/2025] HYDROmorphone (Dilaudid) 2 mg tablet, Take 1 tablet (2 mg) by mouth 3 times a day as needed for severe pain (7 - 10) for up to 28 days. Do not fill before March 30, 2025., Disp: 84 tablet, Rfl: 0    [START ON 4/27/2025] HYDROmorphone (Dilaudid) 2 mg tablet, Take 1 tablet (2 mg) by mouth 3 times a day as needed for severe pain (7 - 10) for up to 28 days. Do not fill before April 27, 2025., Disp: 84 tablet, Rfl: 0    leflunomide (Arava) 10 mg tablet, TAKE 1 TABLET BY MOUTH ONCE  DAILY, Disp: 90 tablet, Rfl: 3    levothyroxine (Synthroid, Levoxyl) 75 mcg tablet, TAKE 1 TABLET BY MOUTH ONCE  DAILY IN THE MORNING BEFORE A  MEAL, Disp: 90 tablet, Rfl: 3    lisinopril 20 mg tablet, TAKE 1 TABLET BY MOUTH ONCE  DAILY, Disp: 90 tablet, Rfl: 3    magnesium oxide (Mag-Ox) 250 mg magnesium tablet, Take 1 tablet (250 mg) by mouth once daily., Disp: , Rfl:     metFORMIN (Glucophage) 500 mg tablet, Take 1 tablet (500 mg) by mouth 2 times a day with meals for 14 days., Disp: 28 tablet, Rfl: 0    metFORMIN (Glucophage) 500 mg tablet, TAKE 1 TABLET BY MOUTH TWICE  DAILY WITH MEALS, Disp: 180 tablet, Rfl: 3    multivit-minerals/folic acid (CENTRUM ADULTS ORAL), Take by mouth once daily., Disp: , Rfl:     naloxone (Narcan) 4 mg/0.1 mL nasal spray, Administer 1 spray (4 mg) into affected nostril(s) if needed  for opioid reversal or respiratory depression. May repeat every 2-3 minutes if needed, alternating nostrils, until medical assistance becomes available., Disp: 2 each, Rfl: 0    OneTouch Ultra Test strip, Apply 1 strip topically once daily., Disp: 100 strip, Rfl: 3    potassium gluconate 595 mg (99 mg) tablet, Take 1 tablet by mouth once daily., Disp: , Rfl:     sodium chloride 0.9% parenteral solution with golimumab 12.5 mg/mL solution 2 mg/kg, Infuse 2 mg/kg into a venous catheter every 8 (eight) weeks., Disp: , Rfl:     Tresiba FlexTouch U-100 100 unit/mL (3 mL) injection, Inject 8 Units under the skin once daily in the morning., Disp: 9 mL, Rfl: 3        Physical Exam  Constitutional:       Appearance: Normal appearance. She is obese.   HENT:      Head: Normocephalic and atraumatic.      Nose: Nose normal.      Mouth/Throat:      Mouth: Mucous membranes are moist.      Pharynx: Oropharynx is clear.   Eyes:      Conjunctiva/sclera: Conjunctivae normal.      Pupils: Pupils are equal, round, and reactive to light.   Cardiovascular:      Rate and Rhythm: Regular rhythm. Tachycardia present.      Pulses: Normal pulses.      Heart sounds: Normal heart sounds.   Pulmonary:      Effort: Pulmonary effort is normal.      Breath sounds: Examination of the right-lower field reveals decreased breath sounds. Examination of the left-lower field reveals decreased breath sounds. Decreased breath sounds present.   Abdominal:      General: Bowel sounds are normal.      Palpations: Abdomen is soft.   Musculoskeletal:         General: Swelling and tenderness (all joints) present.      Cervical back: Normal range of motion and neck supple.   Skin:     General: Skin is warm and dry.      Capillary Refill: Capillary refill takes 2 to 3 seconds.   Neurological:      Mental Status: She is alert and oriented to person, place, and time.   Psychiatric:         Mood and Affect: Mood normal.         Behavior: Behavior normal.         Thought  Content: Thought content normal.         Judgment: Judgment normal.     Patient Active Problem List   Diagnosis    Psoriatic arthritis (Multi)    Overactive bladder    Myalgia    Soft tissue mass    Neurogenic claudication due to lumbar spinal stenosis    Acquired partial lipodystrophy    Acute cystitis without hematuria    Acute renal failure syndrome (CMS-HCC)    ALT (SGPT) level raised    Altered mental status    Anal sphincter incontinence    Anxiety    Arthritis    Arthritis of neck    Autoimmune thyroiditis    Blepharitis    Blurred vision, bilateral    Cataract, nuclear sclerotic, both eyes    Cellulitis    Cervical radicular pain    Pain, radicular, lumbar    Choroidal nevus, left eye    Chronic intractable pain    Chronic low back pain    Clouded consciousness    Colovaginal fistula    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    Consecutive exotropia    Change in bowel habits    Constipation    Contact dermatitis    Controlled insulin dependent type 1 diabetes mellitus (Multi)    Type 2 diabetes mellitus    DDD (degenerative disc disease), thoracic    Dermatochalasis    Difficulty walking    Disorder of refraction    Disturbance, visual, subjective    Diverticulosis of colon    Dry eyes    Early cataract    Elevated creatine kinase    Essential (primary) hypertension    Fecal soiling due to fecal incontinence    Fistula involving female genital tract    Fracture of cervical vertebra    Gastroesophageal reflux disease    Generalized osteoarthritis of multiple sites    Hemorrhoids without complication    Iron deficiency anemia    Leukopenia    Lumbar radiculitis    Mixed hyperlipidemia    Myofascial pain    Numbness    Obesity with body mass index 30 or greater    Palsy of conjugate gaze    Poor venous access    Incisional hernia    Postlaminectomy syndrome, lumbar region    Pseudophakia of both eyes    Psoriasis    Recurrent urinary tract infection    Retinopathy, background, nonproliferative, mild    Skin  excoriation    Swelling of upper arm    Vaginal disorder    Weakness generalized    Frequent falls    Lumbar compression fracture, closed, initial encounter (Multi)     Patient Active Problem List   Diagnosis    Psoriatic arthritis (Multi)    Overactive bladder    Myalgia    Soft tissue mass    Neurogenic claudication due to lumbar spinal stenosis    Acquired partial lipodystrophy    Acute cystitis without hematuria    Acute renal failure syndrome (CMS-HCC)    ALT (SGPT) level raised    Altered mental status    Anal sphincter incontinence    Anxiety    Arthritis    Arthritis of neck    Autoimmune thyroiditis    Blepharitis    Blurred vision, bilateral    Cataract, nuclear sclerotic, both eyes    Cellulitis    Cervical radicular pain    Pain, radicular, lumbar    Choroidal nevus, left eye    Chronic intractable pain    Chronic low back pain    Clouded consciousness    Colovaginal fistula    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    Consecutive exotropia    Change in bowel habits    Constipation    Contact dermatitis    Controlled insulin dependent type 1 diabetes mellitus (Multi)    Type 2 diabetes mellitus    DDD (degenerative disc disease), thoracic    Dermatochalasis    Difficulty walking    Disorder of refraction    Disturbance, visual, subjective    Diverticulosis of colon    Dry eyes    Early cataract    Elevated creatine kinase    Essential (primary) hypertension    Fecal soiling due to fecal incontinence    Fistula involving female genital tract    Fracture of cervical vertebra    Gastroesophageal reflux disease    Generalized osteoarthritis of multiple sites    Hemorrhoids without complication    Iron deficiency anemia    Leukopenia    Lumbar radiculitis    Mixed hyperlipidemia    Myofascial pain    Numbness    Obesity with body mass index 30 or greater    Palsy of conjugate gaze    Poor venous access    Incisional hernia    Postlaminectomy syndrome, lumbar region    Pseudophakia of both eyes    Psoriasis     Recurrent urinary tract infection    Retinopathy, background, nonproliferative, mild    Skin excoriation    Swelling of upper arm    Vaginal disorder    Weakness generalized    Frequent falls    Lumbar compression fracture, closed, initial encounter (Multi)         Assessment/Plan   Diagnoses and all orders for this visit:  Mixed hyperlipidemia  Comments:  Continue Lipitor 40 mg po daily.  Autoimmune thyroiditis  Comments:  Continue Levothryoxine 75 mcg po daily.  Type 2 diabetes mellitus without complication, with long-term current use of insulin (Multi)  Comments:  Continue to Metformin 500 mg po twice a day.  Continue Tresiba 8 units SQ daily.   Continue blood sugar monitoring.  Diabetic diet.  Essential (primary) hypertension  Comments:  Stable  Continue Lisinopril 20 mg po daily.  Anxiety  Comments:  Continue Cymbalta 30 mg po daily.  Continue Wellbutrin 100 mg po daily.  Psoriatic arthritis (Multi)  Comments:  Continue Arava 10 mg po daily.     Follow up with labs in 2 months or prn.   Angela Dyer, APRN-CNP

## 2025-03-27 DIAGNOSIS — E78.2 MIXED HYPERLIPIDEMIA: ICD-10-CM

## 2025-03-28 RX ORDER — ATORVASTATIN CALCIUM 40 MG/1
40 TABLET, FILM COATED ORAL DAILY
Qty: 90 TABLET | Refills: 3 | Status: SHIPPED | OUTPATIENT
Start: 2025-03-28

## 2025-04-03 ENCOUNTER — APPOINTMENT (OUTPATIENT)
Dept: PAIN MEDICINE | Facility: CLINIC | Age: 70
End: 2025-04-03
Payer: MEDICARE

## 2025-04-07 ENCOUNTER — OFFICE VISIT (OUTPATIENT)
Dept: PAIN MEDICINE | Facility: CLINIC | Age: 70
End: 2025-04-07
Payer: MEDICARE

## 2025-04-07 VITALS — HEART RATE: 76 BPM | SYSTOLIC BLOOD PRESSURE: 99 MMHG | RESPIRATION RATE: 20 BRPM | DIASTOLIC BLOOD PRESSURE: 60 MMHG

## 2025-04-07 DIAGNOSIS — M48.062 NEUROGENIC CLAUDICATION DUE TO LUMBAR SPINAL STENOSIS: Primary | ICD-10-CM

## 2025-04-07 PROCEDURE — 4010F ACE/ARB THERAPY RXD/TAKEN: CPT | Performed by: ANESTHESIOLOGY

## 2025-04-07 PROCEDURE — 3078F DIAST BP <80 MM HG: CPT | Performed by: ANESTHESIOLOGY

## 2025-04-07 PROCEDURE — 1036F TOBACCO NON-USER: CPT | Performed by: ANESTHESIOLOGY

## 2025-04-07 PROCEDURE — 99214 OFFICE O/P EST MOD 30 MIN: CPT | Performed by: ANESTHESIOLOGY

## 2025-04-07 PROCEDURE — G2211 COMPLEX E/M VISIT ADD ON: HCPCS | Performed by: ANESTHESIOLOGY

## 2025-04-07 PROCEDURE — 1159F MED LIST DOCD IN RCRD: CPT | Performed by: ANESTHESIOLOGY

## 2025-04-07 PROCEDURE — 3074F SYST BP LT 130 MM HG: CPT | Performed by: ANESTHESIOLOGY

## 2025-04-07 ASSESSMENT — PAIN - FUNCTIONAL ASSESSMENT: PAIN_FUNCTIONAL_ASSESSMENT: 0-10

## 2025-04-07 ASSESSMENT — ENCOUNTER SYMPTOMS
LOSS OF SENSATION IN FEET: 0
DEPRESSION: 0
OCCASIONAL FEELINGS OF UNSTEADINESS: 1

## 2025-04-07 ASSESSMENT — PAIN DESCRIPTION - DESCRIPTORS: DESCRIPTORS: BURNING;RADIATING

## 2025-04-07 ASSESSMENT — PAIN SCALES - GENERAL: PAINLEVEL_OUTOF10: 10 - WORST POSSIBLE PAIN

## 2025-04-07 NOTE — PROGRESS NOTES
Subjective   Patient ID: Nesha Mcleod is a 70 y.o. female with a past medical history of hypertension, hyperlipidemia, diabetes, autoimmune thyroiditis, postlaminectomy syndrome who presents with chronic low back and leg pain      HPI:   70-year-old female with history of hypertension, hyperlipidemia, diabetes, postlaminectomy syndrome who presents with chronic low back and leg pain.  Patient describes pain as sharp and stabbing, 10 out of 10 in severity.  Pain goes from the low back and radiates down bilateral lower extremities anterior lateral and posterior aspect only down to the foot.  Pain is worse with prolonged standing and walking, only partially relieved with sitting and laying down.  Patient uses a walker due to the pain symptoms.  Patient is lying down in recliner for most of the day due to the pain and symptoms.  Patient endorses legs feeling weak, chronic bowel and bladder incontinence, and increased dizziness and falls.  Of note patient had a fall at the parking lot of the clinic.  Patient fell on her buttocks and was helped by staff.  Denies hitting the head or loss of consciousness.  Patient had a left hip injection January 2025 with 0% relief of pain.  Of note patient had an intrathecal pain pump placed years ago which was explanted.  Due to patient's chronic medical conditions patient was deemed a poor candidate for replacement.    I have personally reviewed the OARRS report for Nesha Mcleod I have considered the risks of abuse, dependence, addiction and diversion    OARRS:  No data recorded  I have personally reviewed the OARRS report for Nesha Mcleod. I have considered the risks of abuse, dependence, addiction and diversion    Is the patient prescribed a combination of a benzodiazepine and opioid?  No  Controlled Substance Agreement:  Reviewed Controlled Substance Agreement including but not limited to the benefits, risks, and alternatives to treatment with a Controlled Substance  medication(s).      Review of Systems   13-point ROS done and negative except for HPI.     Current Outpatient Medications   Medication Instructions    acetaminophen (TYLENOL) 650 mg, Every 4 hours PRN    aspirin 81 mg, Daily    atorvastatin (LIPITOR) 40 mg, oral, Daily    buPROPion SR (WELLBUTRIN SR) 100 mg, oral, Daily, DO NOT CRUSH CHEW OR SPLIT    cinnamon bark extract 500 mg tablet Cinnamon 500 MG as directed Orally daily Active    cyclobenzaprine (FLEXERIL) 10 mg, oral, 2 times daily PRN    diazePAM (Valium) 5 mg tablet TAKE 1-2 TABS PO ONE HOUR PRIOR TO PROCEDURE. MAY REPEAT UPON ARRIVAL TO PROCEDURE    DULoxetine (CYMBALTA) 30 mg, oral, Daily, Do not crush or chew.    furosemide (LASIX) 40 mg, oral, Daily    gabapentin (NEURONTIN) 300 mg, oral, 2 times daily    HYDROmorphone (DILAUDID) 2 mg, oral, 3 times daily PRN    HYDROmorphone (DILAUDID) 2 mg, oral, 3 times daily PRN    [START ON 4/27/2025] HYDROmorphone (DILAUDID) 2 mg, oral, 3 times daily PRN    leflunomide (ARAVA) 10 mg, oral, Daily    levothyroxine (SYNTHROID, LEVOXYL) 75 mcg, oral, Daily before breakfast    lisinopril 20 mg, oral, Daily    magnesium oxide (MAG-OX) 250 mg, Daily    metFORMIN (GLUCOPHAGE) 500 mg, oral, 2 times daily (morning and late afternoon)    metFORMIN (GLUCOPHAGE) 500 mg, oral, 2 times daily (morning and late afternoon)    multivit-minerals/folic acid (CENTRUM ADULTS ORAL) Daily    naloxone (NARCAN) 4 mg, nasal, As needed, May repeat every 2-3 minutes if needed, alternating nostrils, until medical assistance becomes available.    OneTouch Ultra Test strip 1 strip, Topical, Daily    potassium gluconate 595 mg (99 mg) tablet 1 tablet, Daily    sodium chloride 0.9% parenteral solution with golimumab 12.5 mg/mL solution 2 mg/kg 2 mg/kg, Every 8 weeks    Tresiba FlexTouch U-100 8 Units, subcutaneous, Every morning       Past Medical History:   Diagnosis Date    Anxiety disorder, unspecified     Anxiety    Arthritis     Arthropathic  psoriasis, unspecified (Multi)     Psoriatic arthropathy    Chronic back pain     DM type 2 (diabetes mellitus, type 2) (Multi)     GERD (gastroesophageal reflux disease)     Hypertension     Injury of head 05/09/2024    Other chronic pain     Chronic pain    Other conditions influencing health status     Osteoarthritis    Personal history of diseases of the skin and subcutaneous tissue     History of psoriasis    Personal history of other diseases of the musculoskeletal system and connective tissue     History of fibromyositis    Personal history of other endocrine, nutritional and metabolic disease     History of hypothyroidism        Past Surgical History:   Procedure Laterality Date    APPENDECTOMY      Appendectomy    BACK SURGERY      x7    BREAST SURGERY Bilateral     Breast Surgery Reduction Procedure    CARPAL TUNNEL RELEASE      Neuroplasty Decompression Median Nerve At Carpal Tunnel    CARPAL TUNNEL RELEASE      Neuroplasty Decompression Median Nerve At Carpal Tunnel    CERVICAL FUSION      CHOLECYSTECTOMY      Cholecystectomy    ESOPHAGOGASTRODUODENOSCOPY      EYE SURGERY      Eye Surgery    FOOT SURGERY      Foot Surgery    GALLBLADDER SURGERY      Gallbladder Surgery    GASTRIC BYPASS      HERNIA REPAIR      Hernia Repair    HYSTERECTOMY      Hysterectomy    INCISIONAL HERNIA REPAIR  09/24/2020    with mesh    KNEE ARTHROSCOPY W/ DEBRIDEMENT      Arthroscopy Knee Left    KNEE ARTHROSCOPY W/ DEBRIDEMENT      Arthroscopy Knee Right    KNEE SURGERY      Knee Surgery    KNEE SURGERY      Knee Surgery    LIPECTOMY  01/2014    PANNICULECTOMY    LIPECTOMY      NECK SURGERY      Neck Surgery    OTHER SURGICAL HISTORY      Dental Surgery    OTHER SURGICAL HISTORY      Nerve Block Transforaminal Epidural    OTHER SURGICAL HISTORY      Blepharoplasty    OTHER SURGICAL HISTORY  10/13/2016    Rectovaginal Fistula Repair - trasabdomnial - Dr. Pelaez    SHOULDER SURGERY      Shoulder Surgery    SHOULDER SURGERY       Shoulder Surgery    TONSILLECTOMY      Tonsillectomy    TOTAL KNEE ARTHROPLASTY Left     Knee Replacement    TOTAL KNEE ARTHROPLASTY      Knee Replacement        Family History   Problem Relation Name Age of Onset    Hypertension Mother      Asthma Mother      Arthritis Father      Heart disease Father      Arthritis Brother      Hypertension Brother      Hypertension Brother      Lymphoma Maternal Grandmother      Heart disease Paternal Grandfather          Allergies   Allergen Reactions    Methotrexate Swelling    Trazodone Other     Shaking     Adhesive Tape-Silicones Hives    Bleach (Sodium Hypochlorite) Hives, Unknown and Itching    Chlorine Unknown and Other    Ciprofloxacin Other and Unknown     Yeast infection    Citalopram Other     Shaking    Codeine Hives and Rash    Doxycycline Nausea And Vomiting and Unknown    Latex Hives and Unknown    Morphine Hives and Rash    Penicillins Hives    Soap Unknown and Hives    Tetracycline Hives    Amitriptyline Other     nightmares    Grass Pollen Itching    Infliximab Unknown    Other Hives and Unknown     Cleaning products, Detergents, Suffemeth    Rubber, Unspecified Unknown     Natural rubber    Trace Metals Unknown    Weed Pollen Itching    Methocarbamol Rash and Unknown    Nickel Rash    Sulfa (Sulfonamide Antibiotics) Nausea/vomiting and GI Upset        Objective     Vitals:    04/07/25 1220   BP: 99/60   Pulse: 76   Resp: 20        Physical Exam  General: NAD, well groomed, well nourished  Eyes: Non-icteric sclera, EOMI  Ears, Nose, Mouth, and Throat: External ears and nose appear to be without deformity or rash. No lesions or masses noted. Hearing is grossly intact.   Neck: Trachea midline  Respiratory: Nonlabored breathing   Cardiovascular: +1 peripheral edema   Skin: No rashes or open lesions/ulcers identified on skin.    Back:   Palpation: No tenderness to palpation over lumbar paraspinous muscles.   Limited exam due to poor tolerance, patient unable to  get up on exam table    Neurologic:   Cranial nerves grossly intact.   Strength 4/5 and symmetric plantar/dorsiflexion   Sensation: Normal to light touch throughout  DTRs:normal and symmetric throughout  Bright: absent  Clonus: absent    Psychiatric: Alert, orientation to person, place, and time. Cooperative.    Imaging personally reviewed and independently interpreted:   MRI L spine 2023  T12-L1: Moderate left paracentral disc bulge again noted causing effacement  of the left side of the ventral thecal sac. No significant spinal canal or  neural foraminal narrowing.        L1-2: Mild disc bulge with facet arthropathy is again noted, resulting in  mild spinal canal narrowing. There is also mild bilateral neural foraminal  narrowing, more pronounced on the right.        L2-3: Laminectomy changes again noted. There is broad-based disc osteophyte  with facet hypertrophy resulting in severe spinal canal stenosis. Severe  bilateral neural foraminal narrowing is identified with likely contact and  impingement upon the exiting bilateral nerve roots.        L3-4: Redemonstrated interbody fusion/ankylosis of the L3-L4 vertebral  bodies with laminectomy changes noted. Mild facet joint arthropathy. No  significant spinal canal narrowing. Mild right neural foraminal narrowing.        L4-5: Spondylolisthesis with broad-based disc protrusion, facet hypertrophy,  and mild ligamentum flavum hypertrophy results in mild spinal canal  narrowing. There is also moderate right and mild-to-moderate left neural  foraminal narrowing.    Assessment/Plan   70-year-old female with history of postlaminectomy syndrome and multiple comorbidities including hypertension, hyperlipidemia, diabetes presents with chronic low back and leg pain.  Pain radiates to anterior lateral posterior aspect of legs, worse with prolonged standing and walking, only partially relieved with sitting and lying down.  Patient also endorses chronic bowel and bladder  incontinence, dizziness and falls.  Patient had discussion with orthospine last year and was deemed a poor surgical candidate due to multiple comorbidities.  MRI lumbar spine revealed severe spinal canal stenosis at multiple levels and the L2-3, L3-4, and L4-5.  Discussed plan to do caudal epidural steroid injection for symptom management. Patient recommended to follow-up with ortho spine if any worsening of symptoms.     Plan:  - Continue medications as prescribed  - Caudal epidural steroid injection under fluoroscopy    We discussed  the risks, benefits and alternatives of the procedure including but not limited to: , Lack of efficacy , Transiently worsening pain , Bleeding, Infection , and Nerve Damage    Follow up: After procedure    The patient was invited to contact us back anytime with any questions or concerns and follow-up with us in the office as needed.     Diagnoses and all orders for this visit:  Neurogenic claudication due to lumbar spinal stenosis      This note was generated with the aid of dictation software, there may be typos despite my attempts at proofreading.     Patient seen and plan of care discussed with Dr. Cisneros    Medical necessity: The patient is presenting primarily with Lumbar pain and claudication symptoms.  The pain is constant and of moderate severity that interferes with activities of daily living and sleep. The patient failed conservative therapy to include Tylenol/NSAIDS and physical therapy/supervised home exercise program.  Lumbar spine MRI which I personally reviewed showed L5-S1 laminectomy changes with the mild to moderate foraminal stenosis bilaterally.  Plan is to proceed with Caudal Epidural Steroid Injection with fluoroscopy.  We discussed the risks, benefits and alternatives to the procedure(s) and the patient would like to proceed.  This procedure is part of a comprehensive and multimodal treatment plan to facility physical therapy and a supervised home exercise  program.   Jose F Miller MD  Pain Fellow

## 2025-04-08 ENCOUNTER — APPOINTMENT (OUTPATIENT)
Dept: INFUSION THERAPY | Facility: CLINIC | Age: 70
End: 2025-04-08
Payer: MEDICARE

## 2025-04-09 DIAGNOSIS — M48.062 SPINAL STENOSIS, LUMBAR REGION, WITH NEUROGENIC CLAUDICATION: ICD-10-CM

## 2025-04-09 DIAGNOSIS — M96.1 POSTLAMINECTOMY SYNDROME, LUMBAR REGION: ICD-10-CM

## 2025-04-09 NOTE — H&P
HISTORY AND PHYSICAL UPDATE FOR PROCEDURE    History Of Present Illness  Nesha Mcleod is a 70 y.o. female presenting with back pain.  Here for Caudal epidural steroid injection    This note is intended to be an update to the H&P from the last office consult note. Please refer to the last pain management consult note for full H&P.    she denies any recent antibiotic use or infections, she denies any blood thinner use , and she denies contrast or local anesthetic allergies     Pre-sedation evaluation:  ASA Classification (bolded):   ASA I: Healthy patient, non-smoking, no none or minimal alcohol use  ASA II: Patient with mild systemic disease, without substantiative functional limitations.  Current smoker, social alcohol drinker, pregnancy, obesity (BMI 30-40)DM/HTN,, well-controlled mild lung disease  ASA III: Patient with severe systemic disease; substantiative of functional limitation; One or more moderate to severe diseases: Poorly controlled DM/HTN, COPD, morbid obesity (BMI>40), active hepatitis, alcohol abuse/dependence, implanted pacemaker, moderate reduction of ejection fraction, ESRD on dialysis, history (>3months) of MI, CVA, TIA or CAD/stents  ASA IV: Patient with severe systemic disease that is a constant threat to life; recent (<3 months) MI, CVA, TIA or CAD/stents, ongoing cardiac ischemia or severe valvular dysfunction, severely reduced ejection fraction, shock, sepsis, DIC, ESRD not undergoing regular scheduled dialysis    Mallampati score (bolded):   Class I: Complete visualization of the soft palate  Class II: Complete visualization of the uvula  Class III: Visualization of only the base of the uvula  Class IV: Soft palate is not visible at all    Past Medical History  Past Medical History:   Diagnosis Date    Anxiety disorder, unspecified     Anxiety    Arthritis     Arthropathic psoriasis, unspecified (Multi)     Psoriatic arthropathy    Chronic back pain     DM type 2 (diabetes mellitus, type 2)  (Multi)     GERD (gastroesophageal reflux disease)     Hypertension     Injury of head 05/09/2024    Other chronic pain     Chronic pain    Other conditions influencing health status     Osteoarthritis    Personal history of diseases of the skin and subcutaneous tissue     History of psoriasis    Personal history of other diseases of the musculoskeletal system and connective tissue     History of fibromyositis    Personal history of other endocrine, nutritional and metabolic disease     History of hypothyroidism       Surgical History  Past Surgical History:   Procedure Laterality Date    APPENDECTOMY      Appendectomy    BACK SURGERY      x7    BREAST SURGERY Bilateral     Breast Surgery Reduction Procedure    CARPAL TUNNEL RELEASE      Neuroplasty Decompression Median Nerve At Carpal Tunnel    CARPAL TUNNEL RELEASE      Neuroplasty Decompression Median Nerve At Carpal Tunnel    CERVICAL FUSION      CHOLECYSTECTOMY      Cholecystectomy    ESOPHAGOGASTRODUODENOSCOPY      EYE SURGERY      Eye Surgery    FOOT SURGERY      Foot Surgery    GALLBLADDER SURGERY      Gallbladder Surgery    GASTRIC BYPASS      HERNIA REPAIR      Hernia Repair    HYSTERECTOMY      Hysterectomy    INCISIONAL HERNIA REPAIR  09/24/2020    with mesh    KNEE ARTHROSCOPY W/ DEBRIDEMENT      Arthroscopy Knee Left    KNEE ARTHROSCOPY W/ DEBRIDEMENT      Arthroscopy Knee Right    KNEE SURGERY      Knee Surgery    KNEE SURGERY      Knee Surgery    LIPECTOMY  01/2014    PANNICULECTOMY    LIPECTOMY      NECK SURGERY      Neck Surgery    OTHER SURGICAL HISTORY      Dental Surgery    OTHER SURGICAL HISTORY      Nerve Block Transforaminal Epidural    OTHER SURGICAL HISTORY      Blepharoplasty    OTHER SURGICAL HISTORY  10/13/2016    Rectovaginal Fistula Repair - trasabdomnial - Dr. Pelaez    SHOULDER SURGERY      Shoulder Surgery    SHOULDER SURGERY      Shoulder Surgery    TONSILLECTOMY      Tonsillectomy    TOTAL KNEE ARTHROPLASTY Left     Knee  Replacement    TOTAL KNEE ARTHROPLASTY      Knee Replacement        Social History  She reports that she has never smoked. She has never been exposed to tobacco smoke. She has never used smokeless tobacco. Alcohol use questions deferred to the physician. Drug use questions deferred to the physician.    Family History  Family History   Problem Relation Name Age of Onset    Hypertension Mother      Asthma Mother      Arthritis Father      Heart disease Father      Arthritis Brother      Hypertension Brother      Hypertension Brother      Lymphoma Maternal Grandmother      Heart disease Paternal Grandfather          Allergies  Methotrexate; Trazodone; Adhesive tape-silicones; Bleach (sodium hypochlorite); Chlorine; Ciprofloxacin; Citalopram; Codeine; Doxycycline; Latex; Morphine; Penicillins; Soap; Tetracycline; Amitriptyline; Grass pollen; Infliximab; Other; Rubber, unspecified; Trace metals; Weed pollen; Methocarbamol; Nickel; and Sulfa (sulfonamide antibiotics)    Review of Systems   12 point ROS done and negative except for the above.   Physical Exam     General: NAD, well groomed, well nourished  Eyes: Non-icteric sclera, EOMI  Ears, Nose, Mouth, and Throat: External ears and nose appear to be without deformity or rash. No lesions or masses noted. Hearing is grossly intact.   Neck: Trachea midline  Respiratory: Nonlabored breathing   Cardiovascular: No peripheral edema   Skin: No rashes or open lesions/ulcers identified on skin.    Last Recorded Vitals  There were no vitals taken for this visit.    Relevant Results  Current Outpatient Medications   Medication Instructions    acetaminophen (TYLENOL) 650 mg, Every 4 hours PRN    aspirin 81 mg, Daily    atorvastatin (LIPITOR) 40 mg, oral, Daily    buPROPion SR (WELLBUTRIN SR) 100 mg, oral, Daily, DO NOT CRUSH CHEW OR SPLIT    cinnamon bark extract 500 mg tablet Cinnamon 500 MG as directed Orally daily Active    cyclobenzaprine (FLEXERIL) 10 mg, oral, 2 times daily  "PRN    diazePAM (Valium) 5 mg tablet TAKE 1-2 TABS PO ONE HOUR PRIOR TO PROCEDURE. MAY REPEAT UPON ARRIVAL TO PROCEDURE    DULoxetine (CYMBALTA) 30 mg, oral, Daily, Do not crush or chew.    furosemide (LASIX) 40 mg, oral, Daily    gabapentin (NEURONTIN) 300 mg, oral, 2 times daily    HYDROmorphone (DILAUDID) 2 mg, oral, 3 times daily PRN    HYDROmorphone (DILAUDID) 2 mg, oral, 3 times daily PRN    [START ON 4/27/2025] HYDROmorphone (DILAUDID) 2 mg, oral, 3 times daily PRN    leflunomide (ARAVA) 10 mg, oral, Daily    levothyroxine (SYNTHROID, LEVOXYL) 75 mcg, oral, Daily before breakfast    lisinopril 20 mg, oral, Daily    magnesium oxide (MAG-OX) 250 mg, Daily    metFORMIN (GLUCOPHAGE) 500 mg, oral, 2 times daily (morning and late afternoon)    metFORMIN (GLUCOPHAGE) 500 mg, oral, 2 times daily (morning and late afternoon)    multivit-minerals/folic acid (CENTRUM ADULTS ORAL) Daily    naloxone (NARCAN) 4 mg, nasal, As needed, May repeat every 2-3 minutes if needed, alternating nostrils, until medical assistance becomes available.    OneTouch Ultra Test strip 1 strip, Topical, Daily    potassium gluconate 595 mg (99 mg) tablet 1 tablet, Daily    sodium chloride 0.9% parenteral solution with golimumab 12.5 mg/mL solution 2 mg/kg 2 mg/kg, Every 8 weeks    Tresiba FlexTouch U-100 8 Units, subcutaneous, Every morning      Lab Results   Component Value Date    WBC 6.3 08/27/2024    HGB 12.0 08/27/2024    HCT 37.3 08/27/2024    MCV 94 08/27/2024     08/27/2024      Lab Results   Component Value Date    INR 1.0 10/28/2021    INR 0.9 05/14/2021    INR 1.0 02/16/2021    PROTIME 10.7 10/28/2021    PROTIME 10.5 05/14/2021    PROTIME 11.4 02/16/2021     No results found for: \"PTT\"  Lab Results   Component Value Date    GLUCOSE 132 (H) 08/27/2024    CALCIUM 9.3 08/27/2024     (L) 08/27/2024    K 4.6 08/27/2024    CO2 25 08/27/2024    CL 96 (L) 08/27/2024    BUN 51 (H) 08/27/2024    CREATININE 1.57 (H) 08/27/2024 "       === 05/30/23 ===    MR SACRUM COCCYX WO CONTRAST    - Impression -  1. Unremarkable visualized sacrum. Visualized portions sacral nerve roots  are unremarkable. There is postsurgical change as well as narrowing of the  thecal sac lower lumbar spine which can be correlate with patient's MRI of  the lumbar spine.      Dictation workstation:   [id number]    Original Interpreting Physician:   NINO LANGE MD  Original Transcribed by/Date: MMODAL May 30 2023  1:47P  Original Electronically Signed by/Date: NINO LANGE MD May 31 2023  9:11A    Addendum Interpreting Physician:  Addendum Transcribed by/Date: NO ADDENDUM  Addendum Electronically Signed by/Date:       Assessment/Plan   Nesha Mcleod is a 70 y.o. F who presents for Caudal epidural steroid injection.     Risks, benefits, alternatives discussed. All questions answered to the best of my ability. Patient agrees to proceed. Consent signed and patient marked appropriately.    -We will proceed with planned procedure  -Discussed with the patient that once appropriate from a recovery standpoint, they should continue physical therapy exercises at least 2-3 times per week for best long term outcomes  - discussed with the patient that if they take blood thinners, they may resume them in 24hrs        Ruben Toledo MD  Interventional Pain Fellow, PGY-5  [facility]

## 2025-04-10 ENCOUNTER — HOSPITAL ENCOUNTER (OUTPATIENT)
Dept: PAIN MEDICINE | Facility: CLINIC | Age: 70
Discharge: HOME | End: 2025-04-10
Payer: MEDICARE

## 2025-04-10 VITALS
SYSTOLIC BLOOD PRESSURE: 114 MMHG | HEART RATE: 111 BPM | DIASTOLIC BLOOD PRESSURE: 75 MMHG | OXYGEN SATURATION: 96 % | RESPIRATION RATE: 20 BRPM

## 2025-04-10 DIAGNOSIS — M25.552 LEFT HIP PAIN: ICD-10-CM

## 2025-04-10 DIAGNOSIS — M96.1 POSTLAMINECTOMY SYNDROME, LUMBAR REGION: ICD-10-CM

## 2025-04-10 PROCEDURE — 62323 NJX INTERLAMINAR LMBR/SAC: CPT | Performed by: ANESTHESIOLOGY

## 2025-04-10 PROCEDURE — 2500000004 HC RX 250 GENERAL PHARMACY W/ HCPCS (ALT 636 FOR OP/ED): Performed by: ANESTHESIOLOGY

## 2025-04-10 PROCEDURE — 2500000005 HC RX 250 GENERAL PHARMACY W/O HCPCS: Performed by: ANESTHESIOLOGY

## 2025-04-10 PROCEDURE — 2550000001 HC RX 255 CONTRASTS: Performed by: ANESTHESIOLOGY

## 2025-04-10 RX ORDER — METHYLPREDNISOLONE ACETATE 40 MG/ML
INJECTION, SUSPENSION INTRA-ARTICULAR; INTRALESIONAL; INTRAMUSCULAR; SOFT TISSUE AS NEEDED
Status: COMPLETED | OUTPATIENT
Start: 2025-04-10 | End: 2025-04-10

## 2025-04-10 RX ORDER — INDOMETHACIN 25 MG/1
CAPSULE ORAL AS NEEDED
Status: COMPLETED | OUTPATIENT
Start: 2025-04-10 | End: 2025-04-10

## 2025-04-10 RX ORDER — LIDOCAINE HYDROCHLORIDE 5 MG/ML
INJECTION, SOLUTION INFILTRATION; INTRAVENOUS AS NEEDED
Status: COMPLETED | OUTPATIENT
Start: 2025-04-10 | End: 2025-04-10

## 2025-04-10 RX ORDER — SODIUM CHLORIDE 9 MG/ML
INJECTION, SOLUTION INTRAMUSCULAR; INTRAVENOUS; SUBCUTANEOUS AS NEEDED
Status: COMPLETED | OUTPATIENT
Start: 2025-04-10 | End: 2025-04-10

## 2025-04-10 RX ADMIN — SODIUM CHLORIDE 5 ML: 9 INJECTION, SOLUTION INTRAMUSCULAR; INTRAVENOUS; SUBCUTANEOUS at 12:26

## 2025-04-10 RX ADMIN — SODIUM BICARBONATE 1 MEQ: 84 INJECTION, SOLUTION INTRAVENOUS at 12:26

## 2025-04-10 RX ADMIN — IOHEXOL 5 ML: 240 INJECTION, SOLUTION INTRATHECAL; INTRAVASCULAR; INTRAVENOUS; ORAL at 12:27

## 2025-04-10 RX ADMIN — LIDOCAINE HYDROCHLORIDE 15 ML: 5 INJECTION, SOLUTION INFILTRATION at 12:26

## 2025-04-10 RX ADMIN — METHYLPREDNISOLONE ACETATE 40 MG: 40 INJECTION, SUSPENSION INTRA-ARTICULAR; INTRALESIONAL; INTRAMUSCULAR; INTRASYNOVIAL; SOFT TISSUE at 12:27

## 2025-04-10 ASSESSMENT — PAIN SCALES - GENERAL
PAINLEVEL_OUTOF10: 0 - NO PAIN
PAINLEVEL_OUTOF10: 10 - WORST POSSIBLE PAIN

## 2025-04-10 ASSESSMENT — PAIN DESCRIPTION - DESCRIPTORS: DESCRIPTORS: PRESSURE;RADIATING;SHARP

## 2025-04-10 ASSESSMENT — PAIN - FUNCTIONAL ASSESSMENT: PAIN_FUNCTIONAL_ASSESSMENT: 0-10

## 2025-04-10 NOTE — DISCHARGE INSTRUCTIONS
[facility]  [facility] [address]  [address] [address] [id number] [phone]    POST PROCEDURE INSTRUCTIONS    Activity  Medication used during your procedure may cause some temporary weakness or numbness in your arms or legs, depending on the type of injection you received. It is recommended that you do not drive or operate machinery the day of your injection.  You do not need to stay in bed when you get home. You should be able to resume your normal activities, including work. However, any pre-existing physical restriction you had prior to the procedure may still remain.   Have a responsible adult with you if you received sedation for the procedure. Do not drive or operate machinery for 12 hours.    Pain  Immediate pain relief from the local anesthetic will wear off after several hours.  Prolonged relief from the steroid may take 3-14 days to occur.  Some patients may experience a “flare up” of their normal pain for a few days after the procedure. You may apply ice packs to the sore area for 15minutes on/ 2 hours off and take your prescribed or over the counter analgesics as needed.  Common side effects from the steroid include facial flushing, headaches,fluid retention,trouble sleeping,and cold like symptoms for 24-48 hours post procedure.  Patients receiving diagnostic injections (no steroid): pain relief is intended to be temporary. Pay attention to the percentage of pain relief that occurs compared to before your injection so you can report this to your doctor. Pain scores before and after the procedure need to be documented.    Medications  Resume your normal medications unless otherwise instructed  If you held your blood thinning medication for the procedure,you will be instructed on when to restart.    Injection site care  Keep the injection site clean and dry. You may shower and remove the Band Aid after you arrive home.  If you notice excessive  bleeding (slow general oozing that completely soaks the dressing or fresh bright red bleeding),apply pressure and call our office immediately.  Observe for signs of infection: increasing pain at injection site, redness, swelling, drainage with a foul smell, any associated fever or chills                        If you notice any of these, call our office immediately.    Diabetic patients   You may notice an elevation in blood sugar if steroid is used. Notify your primary care doctor if blood sugar levels do not return to normal.    Follow up  Make a virtual injection follow up appointment  with Dr. Cisneros in 3-4 weeks      Call our office at [phone] to speak to the clinical staff with any concerns or problems.  Go to the nearest emergency room if you are not able to reach us and your problem is urgent.  Call [phone] if you develop serious symptoms such as: chest pain or difficulty breathing.

## 2025-04-15 ENCOUNTER — DOCUMENTATION (OUTPATIENT)
Dept: HOME HEALTH SERVICES | Facility: HOME HEALTH | Age: 70
End: 2025-04-15
Payer: MEDICARE

## 2025-04-15 ENCOUNTER — HOME HEALTH ADMISSION (OUTPATIENT)
Dept: HOME HEALTH SERVICES | Facility: HOME HEALTH | Age: 70
End: 2025-04-15
Payer: MEDICARE

## 2025-04-15 NOTE — HH CARE COORDINATION
Home Care received a Referral for Physical Therapy. We have processed the referral for a Start of Care on 04/17-04/18.     If you have any questions or concerns, please feel free to contact us at [phone]. Follow the prompts, enter your five digit zip code, and you will be directed to your care team on E[facility] 1.

## 2025-04-17 ENCOUNTER — HOME CARE VISIT (OUTPATIENT)
Dept: HOME HEALTH SERVICES | Facility: HOME HEALTH | Age: 70
End: 2025-04-17
Payer: MEDICARE

## 2025-04-17 ENCOUNTER — TELEPHONE (OUTPATIENT)
Dept: PRIMARY CARE | Facility: CLINIC | Age: 70
End: 2025-04-17
Payer: MEDICARE

## 2025-04-17 VITALS
RESPIRATION RATE: 16 BRPM | TEMPERATURE: 97.1 F | DIASTOLIC BLOOD PRESSURE: 57 MMHG | SYSTOLIC BLOOD PRESSURE: 103 MMHG | BODY MASS INDEX: 35.26 KG/M2 | OXYGEN SATURATION: 96 % | HEART RATE: 94 BPM | HEIGHT: 58 IN | WEIGHT: 168 LBS

## 2025-04-17 DIAGNOSIS — R39.9 UTI SYMPTOMS: Primary | ICD-10-CM

## 2025-04-17 PROCEDURE — 169592 NO-PAY CLAIM PROCEDURE

## 2025-04-17 PROCEDURE — G0151 HHCP-SERV OF PT,EA 15 MIN: HCPCS | Mod: HHH

## 2025-04-17 RX ORDER — NITROFURANTOIN 25; 75 MG/1; MG/1
100 CAPSULE ORAL 2 TIMES DAILY
Qty: 10 CAPSULE | Refills: 0 | Status: SHIPPED | OUTPATIENT
Start: 2025-04-17 | End: 2025-04-22

## 2025-04-17 SDOH — HEALTH STABILITY: PHYSICAL HEALTH: EXERCISE TYPE: B LE HEP IN SITTING AND SUPINE

## 2025-04-17 SDOH — HEALTH STABILITY: PHYSICAL HEALTH
EXERCISE COMMENTS: SITTING  ANKLE PUMPS  LAQ  MARCHING      SUPINE  ANKLE PUMPS  ISOM QUADS  ISOM GLUTS  SAQ  HIP ABDUCTION  HEEL SLIDES

## 2025-04-17 ASSESSMENT — ENCOUNTER SYMPTOMS
PAIN LOCATION: BACK
PAIN LOCATION: LEFT LEG
PAIN LOCATION - PAIN FREQUENCY: CONSTANT
PAIN LOCATION - PAIN SEVERITY: 8/10
HIGHEST PAIN SEVERITY IN PAST 24 HOURS: 10/10
PAIN: 1
PAIN LOCATION - EXACERBATING FACTORS: WALKING AND STANDING
PAIN LOCATION - RELIEVING FACTORS: MEDS
DEPRESSION: 0
MUSCLE WEAKNESS: 1
CONTUSION: 1
OCCASIONAL FEELINGS OF UNSTEADINESS: 1
LOSS OF SENSATION IN FEET: 0
LIMITED RANGE OF MOTION: 1
LOWEST PAIN SEVERITY IN PAST 24 HOURS: 8/10
PERSON REPORTING PAIN: PATIENT
SUBJECTIVE PAIN PROGRESSION: WAXING AND WANING
PAIN LOCATION - PAIN FREQUENCY: CONSTANT
PAIN LOCATION - PAIN SEVERITY: 8/10
HYPERTENSION: 1
PAIN LOCATION - PAIN QUALITY: SHARP
PAIN SEVERITY GOAL: 7/10

## 2025-04-17 ASSESSMENT — ACTIVITIES OF DAILY LIVING (ADL)
CURRENT_FUNCTION: STAND BY ASSIST
ENTERING_EXITING_HOME: ONE PERSON
AMBULATION ASSISTANCE ON FLAT SURFACES: 1
DRESSING_UB_CURRENT_FUNCTION: STAND BY ASSIST
OASIS_M1830: 05
DRESSING_LB_CURRENT_FUNCTION: STAND BY ASSIST
AMBULATION ASSISTANCE: ONE PERSON
PHYSICAL TRANSFERS ASSESSED: 1
CURRENT_FUNCTION: CONTACT GUARD ASSIST
AMBULATION ASSISTANCE: 1

## 2025-04-17 NOTE — TELEPHONE ENCOUNTER
"Pt states she has had urgency all day but when she goes she only urinates a small amount.  She states she \"smells like dead fish\". Pt feels she has UTI. Symptoms started this morning. She states she takes a \"pee pill\", I had her grab medication and read name to me. Pt read bottle as furosemide. I advised pt this medication will make her urinate more frequently. Pt stated she knew this about medication but feels she has UTI. Pt denies being able to take sample to the lab. Pt states she had cortisone shot at bottom of spine last week. She states she has had two falls since. She states she feel on her left side last week. Then on Sunday pt states she fell on her forehead. I advised pt she needs to be assessed in the ER for fall with head trauma. Pt stated she is not going to any ER and states she has tow lumps to her forehead. I reitereated need for evaluation in ER. Pt states \"I'm not going to ER with all those illnesses\".   "

## 2025-04-22 ENCOUNTER — HOME CARE VISIT (OUTPATIENT)
Dept: HOME HEALTH SERVICES | Facility: HOME HEALTH | Age: 70
End: 2025-04-22
Payer: MEDICARE

## 2025-04-22 VITALS
OXYGEN SATURATION: 95 % | HEART RATE: 85 BPM | TEMPERATURE: 97.4 F | SYSTOLIC BLOOD PRESSURE: 98 MMHG | DIASTOLIC BLOOD PRESSURE: 60 MMHG

## 2025-04-22 PROCEDURE — G0157 HHC PT ASSISTANT EA 15: HCPCS | Mod: CQ,HHH

## 2025-04-22 ASSESSMENT — ENCOUNTER SYMPTOMS
PAIN LOCATION: LEFT LEG
PAIN LOCATION - EXACERBATING FACTORS: ACTIVITY
HIGHEST PAIN SEVERITY IN PAST 24 HOURS: 10/10
PAIN LOCATION - PAIN SEVERITY: 10/10
PAIN LOCATION - PAIN SEVERITY: 7/10
PAIN LOCATION - RELIEVING FACTORS: MEDICATION REST
PAIN LOCATION: RIGHT LEG
PAIN LOCATION - PAIN SEVERITY: 1/10
PAIN LOCATION - EXACERBATING FACTORS: ACTIVITY
PAIN LOCATION - RELIEVING FACTORS: MEDICATION REST
PAIN LOCATION - PAIN QUALITY: NUMBNESS TINGLING
PERSON REPORTING PAIN: PATIENT
PAIN: 1
PAIN LOCATION - RELIEVING FACTORS: MEDICATION REST
PAIN LOCATION - EXACERBATING FACTORS: ACTIVITY
PAIN LOCATION - PAIN QUALITY: SHARP
PAIN LOCATION: BACK
LOWEST PAIN SEVERITY IN PAST 24 HOURS: 1/10
SUBJECTIVE PAIN PROGRESSION: WAXING AND WANING

## 2025-04-23 ENCOUNTER — TELEPHONE (OUTPATIENT)
Dept: RHEUMATOLOGY | Facility: CLINIC | Age: 70
End: 2025-04-23
Payer: MEDICARE

## 2025-04-23 NOTE — TELEPHONE ENCOUNTER
Patient left voicemail about that she called 4 1/2 weeks ago about scheduling a phone visit with Dr. Valdes and has not heard back   Please call patient to schedule

## 2025-04-25 ENCOUNTER — HOME CARE VISIT (OUTPATIENT)
Dept: HOME HEALTH SERVICES | Facility: HOME HEALTH | Age: 70
End: 2025-04-25
Payer: MEDICARE

## 2025-04-28 ENCOUNTER — TELEPHONE (OUTPATIENT)
Dept: PRIMARY CARE | Facility: CLINIC | Age: 70
End: 2025-04-28
Payer: MEDICARE

## 2025-04-28 ENCOUNTER — HOME CARE VISIT (OUTPATIENT)
Dept: HOME HEALTH SERVICES | Facility: HOME HEALTH | Age: 70
End: 2025-04-28
Payer: MEDICARE

## 2025-04-28 DIAGNOSIS — G89.29 CHRONIC LEFT-SIDED LOW BACK PAIN, UNSPECIFIED WHETHER SCIATICA PRESENT: Primary | ICD-10-CM

## 2025-04-28 DIAGNOSIS — L40.50 PSORIATIC ARTHRITIS (MULTI): ICD-10-CM

## 2025-04-28 DIAGNOSIS — R52 PAIN AGGRAVATED BY PHYSICAL ACTIVITY: ICD-10-CM

## 2025-04-28 DIAGNOSIS — M54.50 CHRONIC LEFT-SIDED LOW BACK PAIN, UNSPECIFIED WHETHER SCIATICA PRESENT: Primary | ICD-10-CM

## 2025-04-28 RX ORDER — PREDNISONE 10 MG/1
TABLET ORAL
Qty: 30 TABLET | Refills: 0 | Status: SHIPPED | OUTPATIENT
Start: 2025-04-28 | End: 2025-05-08

## 2025-04-28 ASSESSMENT — ACTIVITIES OF DAILY LIVING (ADL)
CURRENT_FUNCTION: STAND BY ASSIST
CURRENT_FUNCTION: ONE PERSON
HOME_HEALTH_OASIS: 00
OASIS_M1830: 04
AMBULATION ASSISTANCE: ONE PERSON
AMBULATION ASSISTANCE: STAND BY ASSIST
AMBULATION ASSISTANCE ON FLAT SURFACES: 1

## 2025-04-28 ASSESSMENT — ENCOUNTER SYMPTOMS
MUSCLE WEAKNESS: 1
LIMITED RANGE OF MOTION: 1

## 2025-04-28 NOTE — TELEPHONE ENCOUNTER
Spoke with pt and pt is having pain that is worse with any movement and is unable to ambulate more than a couple steps. Pt requesting Xrays of left leg and hip. Will order Prednisone taper to see if that helps with inflammation. Pt agreeable with plan.

## 2025-04-28 NOTE — TELEPHONE ENCOUNTER
Patient calls to report she has left hip and leg pain from outside of left hip to left ankle.  Patient gives history with information as follows: 2 weeks ago started PT, had one session in week one, one session in week two then cancelled due to discomfort with PT sessions, but also reports she had a fall 1.5 weeks ago, did not report to PT prior to stopping her therapy.  Patient saw chiropractor last week Thursday, reports chiropractor told her she has left foot drop, needs brace to correct--notes no adjustments were performed by the chiropractor.    Patient reports after the fall 1.5 weeks ago she was able to walk fine, now is having difficulty walking from her recliner to bathroom and/or kitchen and back since Friday (day after chiropractor visit.  Please advise.

## 2025-04-28 NOTE — TELEPHONE ENCOUNTER
Can you please order Xray of left hip, left ankle and left fibula please. Diagnosis pain.   Thanks!

## 2025-04-29 ENCOUNTER — TELEPHONE (OUTPATIENT)
Dept: PRIMARY CARE | Facility: CLINIC | Age: 70
End: 2025-04-29
Payer: MEDICARE

## 2025-04-29 NOTE — TELEPHONE ENCOUNTER
ANKLE 2V, LEFT    Results: No acute fracture or dislocation.  The osseous structures appear intact.  Modest joint space narrowing.  Soft tissues are unremarkable.     Conclusion: No acute osseous findings, Recommend a repeat multi-view imaging in 1 week or sooner if clinically warranted especially if symptoms continue to persist or progress.     TIBIA and FIBULA 2V, LEFT    Results: No acute fracture or dislocation.  The osseous structures appear intact. Modest joint space narrowing.  Soft tissues are unremarkable.  Postoperative changes.     Conclusion: No acute osseous findings. Recommend a repeat multi-view imaging in 1 week or sooner if clinically warranted, especially if symptoms continue to persist or progress.

## 2025-04-30 ENCOUNTER — APPOINTMENT (OUTPATIENT)
Dept: RADIOLOGY | Facility: CLINIC | Age: 70
End: 2025-04-30
Payer: MEDICARE

## 2025-05-07 ENCOUNTER — TELEMEDICINE (OUTPATIENT)
Dept: RHEUMATOLOGY | Facility: CLINIC | Age: 70
End: 2025-05-07
Payer: MEDICARE

## 2025-05-07 DIAGNOSIS — Z79.899 ENCOUNTER FOR LONG-TERM (CURRENT) USE OF MEDICATIONS: ICD-10-CM

## 2025-05-07 DIAGNOSIS — L40.50 PSORIATIC ARTHRITIS (MULTI): Primary | ICD-10-CM

## 2025-05-07 PROCEDURE — 1159F MED LIST DOCD IN RCRD: CPT | Performed by: INTERNAL MEDICINE

## 2025-05-07 PROCEDURE — G2211 COMPLEX E/M VISIT ADD ON: HCPCS | Performed by: INTERNAL MEDICINE

## 2025-05-07 PROCEDURE — 4010F ACE/ARB THERAPY RXD/TAKEN: CPT | Performed by: INTERNAL MEDICINE

## 2025-05-07 PROCEDURE — 99212 OFFICE O/P EST SF 10 MIN: CPT | Performed by: INTERNAL MEDICINE

## 2025-05-07 ASSESSMENT — ENCOUNTER SYMPTOMS
CONSTIPATION: 1
ARTHRALGIAS: 1
ROS SKIN COMMENTS: PSORIASIS
DIARRHEA: 1
BACK PAIN: 1
FATIGUE: 1

## 2025-05-07 NOTE — PROGRESS NOTES
Subjective   Patient ID: Nesha Mcleod is a 70 y.o. female who presents for Follow-up (6 mo fuv; discuss infusions).  HPI  Patient with history of psoriatic arthritis.  Previously had been on methotrexate, Remicade, Cimzia.    Her last visit was a telehealth visit in November and I was unable to physically evaluate her.  At that time she did feel that she had improvement with Simponi Aria infusions.  She had been off and had increasing back pain.    She has had a few falls since I last talked to her.  She has left foot drop and will be going into be fitted for a foot drop brace.  She also had x-rays of her hips that does show some arthritis.  She has weakness mostly in the left leg.  She can tell when she does not get her Simponi Aria infusion on time and is more achy.    Review of Systems   Constitutional:  Positive for fatigue.   Gastrointestinal:  Positive for constipation and diarrhea.   Musculoskeletal:  Positive for arthralgias, back pain and gait problem.   Skin:         Psoriasis       Objective   Physical Exam  Unable to physically evaluate    Assessment/Plan     Psoriatic arthritis previous medications have included methotrexate, Remicade, Cimzia  ` -She has had improvement with Simponi Aria infusions.  When she was off she had increasing pain and difficulty with ambulation and now that she is back on she has been better.     - Will try to have her to get her blood work at her infusion as she has a port.  Will see her again in 6 months or as needed     Spent 12 minutes with greater than then 50% directly with patient.   Sheila Valdes MD 05/07/25 11:58 AM

## 2025-05-08 ENCOUNTER — TELEPHONE (OUTPATIENT)
Dept: PRIMARY CARE | Facility: CLINIC | Age: 70
End: 2025-05-08
Payer: MEDICARE

## 2025-05-08 NOTE — TELEPHONE ENCOUNTER
5/9/25 House Calls visit with Angela Dyer NP confirmed via phone with patient.    The patient was discharged home by Dr. Brenda Elkins and All Fnuez rn in stable condition, accompanied by family. The patient is alert and oriented, is in no respiratory distress and has vital signs within normal limits . The patient's diagnosis, condition and treatment were explained to patient. The patient expressed understanding. No prescriptions given to pt. No work/school note given to pt. A discharge plan has been developed. A  was not involved in the process. Aftercare instructions were given to the patient. Family will transport pt home.

## 2025-05-09 ENCOUNTER — OFFICE VISIT (OUTPATIENT)
Dept: PRIMARY CARE | Facility: CLINIC | Age: 70
End: 2025-05-09
Payer: MEDICARE

## 2025-05-09 VITALS
HEART RATE: 82 BPM | TEMPERATURE: 99 F | DIASTOLIC BLOOD PRESSURE: 72 MMHG | SYSTOLIC BLOOD PRESSURE: 130 MMHG | OXYGEN SATURATION: 94 % | RESPIRATION RATE: 16 BRPM

## 2025-05-09 DIAGNOSIS — I10 ESSENTIAL (PRIMARY) HYPERTENSION: Primary | ICD-10-CM

## 2025-05-09 DIAGNOSIS — Z79.4 TYPE 2 DIABETES MELLITUS WITHOUT COMPLICATION, WITH LONG-TERM CURRENT USE OF INSULIN: ICD-10-CM

## 2025-05-09 DIAGNOSIS — E78.2 MIXED HYPERLIPIDEMIA: ICD-10-CM

## 2025-05-09 DIAGNOSIS — E11.9 TYPE 2 DIABETES MELLITUS WITHOUT COMPLICATION, WITH LONG-TERM CURRENT USE OF INSULIN: ICD-10-CM

## 2025-05-09 DIAGNOSIS — E55.9 VITAMIN D DEFICIENCY: ICD-10-CM

## 2025-05-09 DIAGNOSIS — E06.3 AUTOIMMUNE THYROIDITIS: ICD-10-CM

## 2025-05-09 PROCEDURE — 1160F RVW MEDS BY RX/DR IN RCRD: CPT | Performed by: NURSE PRACTITIONER

## 2025-05-09 PROCEDURE — 3075F SYST BP GE 130 - 139MM HG: CPT | Performed by: NURSE PRACTITIONER

## 2025-05-09 PROCEDURE — 99349 HOME/RES VST EST MOD MDM 40: CPT | Performed by: NURSE PRACTITIONER

## 2025-05-09 PROCEDURE — 4010F ACE/ARB THERAPY RXD/TAKEN: CPT | Performed by: NURSE PRACTITIONER

## 2025-05-09 PROCEDURE — 1159F MED LIST DOCD IN RCRD: CPT | Performed by: NURSE PRACTITIONER

## 2025-05-09 PROCEDURE — 36415 COLL VENOUS BLD VENIPUNCTURE: CPT | Performed by: NURSE PRACTITIONER

## 2025-05-09 PROCEDURE — 1125F AMNT PAIN NOTED PAIN PRSNT: CPT | Performed by: NURSE PRACTITIONER

## 2025-05-09 PROCEDURE — 3078F DIAST BP <80 MM HG: CPT | Performed by: NURSE PRACTITIONER

## 2025-05-09 ASSESSMENT — ENCOUNTER SYMPTOMS
FATIGUE: 1
ALLERGIC/IMMUNOLOGIC NEGATIVE: 1
WEAKNESS: 1
FREQUENCY: 1
RESPIRATORY NEGATIVE: 1
DIARRHEA: 0
HEMATOLOGIC/LYMPHATIC NEGATIVE: 1
SLEEP DISTURBANCE: 1
EYES NEGATIVE: 1
ARTHRALGIAS: 1
ABDOMINAL PAIN: 0
BACK PAIN: 1
NECK STIFFNESS: 1
CONSTIPATION: 0
COUGH: 0
MYALGIAS: 1
NECK PAIN: 1
NUMBNESS: 1
JOINT SWELLING: 1
ACTIVITY CHANGE: 1
APPETITE CHANGE: 1
SHORTNESS OF BREATH: 0

## 2025-05-09 ASSESSMENT — PAIN SCALES - GENERAL: PAINLEVEL_OUTOF10: 10-WORST PAIN EVER

## 2025-05-09 NOTE — PROGRESS NOTES
Subjective   Patient ID: Nesha Mcleod is a 70 y.o. female who is being seen for routine 2 month hospital follow up.    HPI Pt seen in single family apartment unaccompanied. PMHx: Chronic pain, DDD, HTN, HLD, DM, OA, Hypothyroid, Psoriatic arthritis, GERD. Pt seen lying back in recliner with legs elevated. Pt alert and oriented times three and cooperative with examination. Pt with recent fall injuring left leg and hip. Pt reports other frequent falls causing more pain to legs, back and hip. Pt had Xrays but could not complete left hip due to inability to lay flat. Left femur and ankle were negative. Pt getting fitted for foot brace for left foot drop today at [facility]. Pt with severe weakness to left leg. Pt continues with extreme pain to back and left hip and leg. Pt having difficulty ambulating and is only getting up from recliner when having to go to the bathroom or to grab a simple thing to eat such as a sandwich. Pt has been put back on meals on wheels because was only eating sandwiches. Pt reports energy as nonexistent and is not napping during the day. Pt appetite reported as not being like it use to be but a lot of that is due to not being able to get up and prepare herself anything. Pt reports sleep as poor due to the pain. Pt denies fever, chills, night sweats, headaches or dizziness. Pt with difficulty with leg giving out when stands up. Pt denies trouble chewing or swallowing. Pt denies SOB or cough. Denies CP or palpitations. Denies N/V/D or constipation, pt reports bowel movement every other day. Pt admits to urinary frequency and urgency. Pt with c/o all over joint pain, back and neck pain. Pt currently not taking Tresiba as pt blood sugars have been staying below 120 and does not want to have lows. This morning was 111. Pt with no other concerns or complaints at present.   Home Visit medically necessary due to: pt has chronic condition that makes access to a traditional office visit very difficult,  illness or condition that results in activity limitation or restriction that impacts the ability to leave home such as; unsteady gait/ poor condition.      Review of Systems   Constitutional:  Positive for activity change, appetite change and fatigue.   HENT: Negative.     Eyes: Negative.    Respiratory: Negative.  Negative for cough and shortness of breath.    Cardiovascular:  Negative for chest pain and leg swelling.   Gastrointestinal:  Negative for abdominal pain, constipation and diarrhea.   Endocrine: Positive for cold intolerance.   Genitourinary:  Positive for frequency and urgency.   Musculoskeletal:  Positive for arthralgias, back pain, gait problem, joint swelling, myalgias, neck pain and neck stiffness.   Skin: Negative.    Allergic/Immunologic: Negative.    Neurological:  Positive for weakness and numbness (feet).   Hematological: Negative.    Psychiatric/Behavioral:  Positive for sleep disturbance.        Objective   /72 (BP Location: Right arm, Patient Position: Lying, BP Cuff Size: Adult)   Pulse 82   Temp 37.2 °C (99 °F) (Temporal)   Resp 16   SpO2 94%     Current Medications[1]      Physical Exam  Constitutional:       Appearance: Normal appearance. She is normal weight.   HENT:      Head: Normocephalic and atraumatic.      Nose: Nose normal.      Mouth/Throat:      Mouth: Mucous membranes are moist.   Eyes:      Extraocular Movements: Extraocular movements intact.      Conjunctiva/sclera: Conjunctivae normal.      Pupils: Pupils are equal, round, and reactive to light.   Cardiovascular:      Rate and Rhythm: Normal rate and regular rhythm.      Pulses: Normal pulses.      Heart sounds: Normal heart sounds.   Pulmonary:      Breath sounds: Examination of the right-lower field reveals decreased breath sounds. Examination of the left-lower field reveals decreased breath sounds. Decreased breath sounds present.   Abdominal:      General: Bowel sounds are normal.      Palpations: Abdomen is  soft.   Musculoskeletal:         General: Swelling (knees) and tenderness (left leg, back) present.      Cervical back: Normal range of motion and neck supple. Tenderness and bony tenderness present. Pain with movement present.      Thoracic back: Tenderness present. Decreased range of motion.      Lumbar back: Tenderness and bony tenderness present. Decreased range of motion.      Left hip: Tenderness and bony tenderness present. Decreased range of motion.      Left knee: Swelling present. Decreased range of motion. Tenderness present.      Right lower leg: Tenderness present.      Left lower leg: Tenderness present.   Skin:     General: Skin is warm and dry.      Capillary Refill: Capillary refill takes 2 to 3 seconds.   Neurological:      Mental Status: She is alert and oriented to person, place, and time.      Motor: Weakness present.      Gait: Gait abnormal.   Psychiatric:         Mood and Affect: Mood normal.         Behavior: Behavior normal.         Thought Content: Thought content normal.         Judgment: Judgment normal.     Problem List[2]      Assessment/Plan   Diagnoses and all orders for this visit:  Essential (primary) hypertension  Comments:  Stable  Continue Lisinopril 20 mg po daily.  Check CBC and BMP  Orders:  -     Basic metabolic panel; Future  -     CBC; Future  Mixed hyperlipidemia  Comments:  Continue Lipitor 40 mg po daily   Check Lipid panel.  Orders:  -     Lipid panel; Future  Autoimmune thyroiditis  Comments:  Continue Levothyroxine 75 mcg po daily  Check TSH  Orders:  -     Tsh With Reflex To Free T4 If Abnormal; Future  Vitamin D deficiency  Comments:  Check Vitamin D level  Orders:  -     Vitamin D 25-Hydroxy,Total (for eval of Vitamin D levels); Future  Type 2 diabetes mellitus without complication, with long-term current use of insulin  Comments:  Pt currently not taking Tresiba due to low blood suagrs. Continue to monitor bloood sugars and resume Tresiba if elevated.   Check Hgb  A1c  Orders:  -     Hemoglobin A1c; Future    Labs drawn without complication.   Follow up in 2 months or prn.  Angela Dyer, APRN-CNP          [1]   Current Outpatient Medications:     acetaminophen (Tylenol) 325 mg tablet, Take 2 tablets (650 mg) by mouth every 4 hours if needed for mild pain (1 - 3), headaches or fever (temp greater than 38.0 C). THIS ORDER IS SUPERSEDED BY ANY OTHER MEDICATION ORDER FOR MILD PAIN (1-3). MAY BE USED FOR A HIGHER PAIN SCORE IF PATIENT REQUESTS, Disp: , Rfl:     aspirin 81 mg chewable tablet, Chew 1 tablet (81 mg) once daily. HOLD IF GIVEN IN LAST 24 HOURS OR IF HISTORY OF HYPERSENSITIVITY., Disp: , Rfl:     atorvastatin (Lipitor) 40 mg tablet, TAKE 1 TABLET BY MOUTH ONCE  DAILY, Disp: 90 tablet, Rfl: 3    buPROPion SR (Wellbutrin SR) 100 mg 12 hr tablet, TAKE 1 TABLET BY MOUTH ONCE  DAILY DO NOT CRUSH, CHEW, OR  SPLIT, Disp: 90 tablet, Rfl: 3    cinnamon bark extract 500 mg tablet, Cinnamon 500 MG as directed Orally daily Active, Disp: , Rfl:     cyclobenzaprine (Flexeril) 10 mg tablet, Take 1 tablet (10 mg) by mouth 2 times a day as needed for muscle spasms., Disp: 60 tablet, Rfl: 2    DULoxetine (Cymbalta) 30 mg DR capsule, Take 1 capsule (30 mg) by mouth once daily. Do not crush or chew., Disp: 30 capsule, Rfl: 11    furosemide (Lasix) 40 mg tablet, TAKE 1 TABLET BY MOUTH ONCE  DAILY, Disp: 90 tablet, Rfl: 3    gabapentin (Neurontin) 300 mg capsule, Take 1 capsule (300 mg) by mouth 2 times a day. Do not fill before March 2, 2025., Disp: 60 capsule, Rfl: 2    HYDROmorphone (Dilaudid) 2 mg tablet, Take 1 tablet (2 mg) by mouth 3 times a day as needed for severe pain (7 - 10) for up to 28 days. Do not fill before April 27, 2025., Disp: 84 tablet, Rfl: 0    leflunomide (Arava) 10 mg tablet, TAKE 1 TABLET BY MOUTH ONCE  DAILY, Disp: 90 tablet, Rfl: 3    lisinopril 20 mg tablet, TAKE 1 TABLET BY MOUTH ONCE  DAILY, Disp: 90 tablet, Rfl: 3    magnesium oxide (Mag-Ox) 250 mg magnesium  tablet, Take 1 tablet (250 mg) by mouth once daily., Disp: , Rfl:     metFORMIN (Glucophage) 500 mg tablet, Take 1 tablet (500 mg) by mouth 2 times a day with meals for 14 days., Disp: 28 tablet, Rfl: 0    metFORMIN (Glucophage) 500 mg tablet, TAKE 1 TABLET BY MOUTH TWICE  DAILY WITH MEALS, Disp: 180 tablet, Rfl: 3    multivit-minerals/folic acid (CENTRUM ADULTS ORAL), Take 1 tablet by mouth once daily., Disp: , Rfl:     naloxone (Narcan) 4 mg/0.1 mL nasal spray, Administer 1 spray (4 mg) into affected nostril(s) if needed for opioid reversal or respiratory depression. May repeat every 2-3 minutes if needed, alternating nostrils, until medical assistance becomes available., Disp: 2 each, Rfl: 0    OneTouch Ultra Test strip, Apply 1 strip topically once daily., Disp: 100 strip, Rfl: 3    potassium gluconate 595 mg (99 mg) tablet, Take 1 tablet by mouth once daily., Disp: , Rfl:     sodium chloride 0.9% parenteral solution with golimumab 12.5 mg/mL solution 2 mg/kg, Infuse 2 mg/kg into a venous catheter every 8 (eight) weeks., Disp: , Rfl:     diazePAM (Valium) 5 mg tablet, TAKE 1-2 TABS PO ONE HOUR PRIOR TO PROCEDURE. MAY REPEAT UPON ARRIVAL TO PROCEDURE, Disp: 4 tablet, Rfl: 0    HYDROmorphone (Dilaudid) 2 mg tablet, Take 1 tablet (2 mg) by mouth 3 times a day as needed for severe pain (7 - 10) for up to 28 days. Do not fill before March 2, 2025., Disp: 84 tablet, Rfl: 0    HYDROmorphone (Dilaudid) 2 mg tablet, Take 1 tablet (2 mg) by mouth 3 times a day as needed for severe pain (7 - 10) for up to 28 days. Do not fill before March 30, 2025., Disp: 84 tablet, Rfl: 0    levothyroxine (Synthroid, Levoxyl) 75 mcg tablet, TAKE 1 TABLET BY MOUTH ONCE  DAILY IN THE MORNING BEFORE A  MEAL, Disp: 90 tablet, Rfl: 3    Tresiba FlexTouch U-100 100 unit/mL (3 mL) injection, Inject 8 Units under the skin once daily in the morning. (Patient not taking: Reported on 5/9/2025), Disp: 9 mL, Rfl: 3  [2]   Patient Active Problem  List  Diagnosis    Psoriatic arthritis (Multi)    Overactive bladder    Myalgia    Soft tissue mass    Neurogenic claudication due to lumbar spinal stenosis    Acquired partial lipodystrophy    Acute cystitis without hematuria    Acute renal failure syndrome    ALT (SGPT) level raised    Altered mental status    Anal sphincter incontinence    Anxiety    Arthritis    Arthritis of neck    Autoimmune thyroiditis    Blepharitis    Blurred vision, bilateral    Cataract, nuclear sclerotic, both eyes    Cellulitis    Cervical radicular pain    Pain, radicular, lumbar    Choroidal nevus, left eye    Chronic intractable pain    Chronic low back pain    Clouded consciousness    Colovaginal fistula    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    Consecutive exotropia    Change in bowel habits    Constipation    Contact dermatitis    Controlled insulin dependent type 1 diabetes mellitus (Multi)    Type 2 diabetes mellitus    DDD (degenerative disc disease), thoracic    Dermatochalasis    Difficulty walking    Disorder of refraction    Disturbance, visual, subjective    Diverticulosis of colon    Dry eyes    Early cataract    Elevated creatine kinase    Essential (primary) hypertension    Fecal soiling due to fecal incontinence    Fistula involving female genital tract    Fracture of cervical vertebra    Gastroesophageal reflux disease    Generalized osteoarthritis of multiple sites    Hemorrhoids without complication    Iron deficiency anemia    Leukopenia    Lumbar radiculitis    Mixed hyperlipidemia    Myofascial pain    Numbness    Obesity with body mass index 30 or greater    Palsy of conjugate gaze    Poor venous access    Incisional hernia    Postlaminectomy syndrome, lumbar region    Pseudophakia of both eyes    Psoriasis    Recurrent urinary tract infection    Retinopathy, background, nonproliferative, mild    Skin excoriation    Swelling of upper arm    Vaginal disorder    Weakness generalized    Frequent falls    Lumbar  compression fracture, closed, initial encounter (Multi)

## 2025-05-10 LAB
25(OH)D3+25(OH)D2 SERPL-MCNC: 49 NG/ML (ref 30–100)
ANION GAP SERPL CALCULATED.4IONS-SCNC: 10 MMOL/L (CALC) (ref 7–17)
BUN SERPL-MCNC: 37 MG/DL (ref 7–25)
BUN/CREAT SERPL: 31 (CALC) (ref 6–22)
CALCIUM SERPL-MCNC: 9 MG/DL (ref 8.6–10.4)
CHLORIDE SERPL-SCNC: 92 MMOL/L (ref 98–110)
CHOLEST SERPL-MCNC: 190 MG/DL
CHOLEST/HDLC SERPL: 2 (CALC)
CO2 SERPL-SCNC: 31 MMOL/L (ref 20–32)
CREAT SERPL-MCNC: 1.18 MG/DL (ref 0.5–1.05)
EGFRCR SERPLBLD CKD-EPI 2021: 50 ML/MIN/1.73M2
ERYTHROCYTE [DISTWIDTH] IN BLOOD BY AUTOMATED COUNT: 13.5 % (ref 11–15)
EST. AVERAGE GLUCOSE BLD GHB EST-MCNC: 154 MG/DL
EST. AVERAGE GLUCOSE BLD GHB EST-SCNC: 8.5 MMOL/L
GLUCOSE SERPL-MCNC: 126 MG/DL (ref 65–99)
HBA1C MFR BLD: 7 %
HCT VFR BLD AUTO: 35.1 % (ref 35–45)
HDLC SERPL-MCNC: 93 MG/DL
HGB BLD-MCNC: 11.3 G/DL (ref 11.7–15.5)
LDLC SERPL CALC-MCNC: 74 MG/DL (CALC)
MCH RBC QN AUTO: 31.1 PG (ref 27–33)
MCHC RBC AUTO-ENTMCNC: 32.2 G/DL (ref 32–36)
MCV RBC AUTO: 96.7 FL (ref 80–100)
NONHDLC SERPL-MCNC: 97 MG/DL (CALC)
PLATELET # BLD AUTO: 165 THOUSAND/UL (ref 140–400)
PMV BLD REES-ECKER: 11.3 FL (ref 7.5–12.5)
POTASSIUM SERPL-SCNC: 3.6 MMOL/L (ref 3.5–5.3)
RBC # BLD AUTO: 3.63 MILLION/UL (ref 3.8–5.1)
SODIUM SERPL-SCNC: 133 MMOL/L (ref 135–146)
TRIGL SERPL-MCNC: 145 MG/DL
TSH SERPL-ACNC: 4.01 MIU/L (ref 0.4–4.5)
WBC # BLD AUTO: 6.1 THOUSAND/UL (ref 3.8–10.8)

## 2025-05-13 ENCOUNTER — TELEPHONE (OUTPATIENT)
Dept: RHEUMATOLOGY | Facility: CLINIC | Age: 70
End: 2025-05-13
Payer: MEDICARE

## 2025-05-13 ENCOUNTER — TELEPHONE (OUTPATIENT)
Dept: PRIMARY CARE | Facility: CLINIC | Age: 70
End: 2025-05-13
Payer: MEDICARE

## 2025-05-13 ENCOUNTER — APPOINTMENT (OUTPATIENT)
Dept: PAIN MEDICINE | Facility: CLINIC | Age: 70
End: 2025-05-13
Payer: MEDICARE

## 2025-05-13 DIAGNOSIS — L40.50 PSORIATIC ARTHRITIS (MULTI): ICD-10-CM

## 2025-05-13 DIAGNOSIS — M79.18 CERVICAL MYOFASCIAL PAIN SYNDROME: ICD-10-CM

## 2025-05-13 DIAGNOSIS — M96.1 POSTLAMINECTOMY SYNDROME, LUMBAR REGION: ICD-10-CM

## 2025-05-13 DIAGNOSIS — L40.50 PSORIATIC ARTHRITIS (MULTI): Primary | ICD-10-CM

## 2025-05-13 RX ORDER — HYDROMORPHONE HYDROCHLORIDE 2 MG/1
2 TABLET ORAL 3 TIMES DAILY PRN
Qty: 84 TABLET | Refills: 0 | Status: SHIPPED | OUTPATIENT
Start: 2025-05-26 | End: 2025-06-23

## 2025-05-13 RX ORDER — PREDNISONE 10 MG/1
TABLET ORAL
Qty: 50 TABLET | Refills: 0 | Status: SHIPPED | OUTPATIENT
Start: 2025-05-13 | End: 2025-06-02

## 2025-05-13 NOTE — TELEPHONE ENCOUNTER
Pt cx appt today due to lack of transportation. She is currently homebound, she has had frequent falls due to her leg weakness but no change from when seen by Dr woodward for her hip injection a few weeks ago. She was seen in her home by PCP CNP and evaluated. Notes reviewed. AFO brace ordered and she was fitted last week;brace should be available soon. Leg xrays done were neg. Had been doing home PT but feels it was aggravating pain. Discussed with DR MCELROY,will send 1 RX. Pt r/s for next month

## 2025-05-13 NOTE — TELEPHONE ENCOUNTER
Result Communication    Resulted Orders   Basic metabolic panel   Result Value Ref Range    GLUCOSE 126 (H) 65 - 99 mg/dL      Comment:                    Fasting reference interval     For someone without known diabetes, a glucose  value >125 mg/dL indicates that they may have  diabetes and this should be confirmed with a  follow-up test.         UREA NITROGEN (BUN) 37 (H) 7 - 25 mg/dL    CREATININE 1.18 (H) 0.50 - 1.05 mg/dL    EGFR 50 (L) > OR = 60 mL/min/1.73m2    BUN/CREATININE RATIO 31 (H) 6 - 22 (calc)    SODIUM 133 (L) 135 - 146 mmol/L    POTASSIUM 3.6 3.5 - 5.3 mmol/L    CHLORIDE 92 (L) 98 - 110 mmol/L    CARBON DIOXIDE 31 20 - 32 mmol/L    ELECTROLYTE BALANCE 10 7 - 17 mmol/L (calc)    CALCIUM 9.0 8.6 - 10.4 mg/dL   CBC   Result Value Ref Range    WHITE BLOOD CELL COUNT 6.1 3.8 - 10.8 Thousand/uL    RED BLOOD CELL COUNT 3.63 (L) 3.80 - 5.10 Million/uL    HEMOGLOBIN 11.3 (L) 11.7 - 15.5 g/dL    HEMATOCRIT 35.1 35.0 - 45.0 %    MCV 96.7 80.0 - 100.0 fL    MCH 31.1 27.0 - 33.0 pg    MCHC 32.2 32.0 - 36.0 g/dL      Comment:      For adults, a slight decrease in the calculated MCHC  value (in the range of 30 to 32 g/dL) is most likely  not clinically significant; however, it should be  interpreted with caution in correlation with other  red cell parameters and the patient's clinical  condition.      RDW 13.5 11.0 - 15.0 %    PLATELET COUNT 165 140 - 400 Thousand/uL    MPV 11.3 7.5 - 12.5 fL   Lipid panel   Result Value Ref Range    CHOLESTEROL, TOTAL 190 <200 mg/dL    HDL CHOLESTEROL 93 > OR = 50 mg/dL    TRIGLYCERIDES 145 <150 mg/dL    LDL-CHOLESTEROL 74 mg/dL (calc)      Comment:      Reference range: <100     Desirable range <100 mg/dL for primary prevention;    <70 mg/dL for patients with CHD or diabetic patients   with > or = 2 CHD risk factors.     LDL-C is now calculated using the Josue-Brooks   calculation, which is a validated novel method providing   better accuracy than the Friedewald equation in  the   estimation of LDL-C.   Josue MONSON et al. ALYSSA. 2013;310(19): 6796-2311   (http://education.[employer] natural/faq/OUY358)      CHOL/HDLC RATIO 2.0 <5.0 (calc)    NON HDL CHOLESTEROL 97 <130 mg/dL (calc)      Comment:      For patients with diabetes plus 1 major ASCVD risk   factor, treating to a non-HDL-C goal of <100 mg/dL   (LDL-C of <70 mg/dL) is considered a therapeutic   option.     Tsh With Reflex To Free T4 If Abnormal   Result Value Ref Range    TSH W/REFLEX TO FT4 4.01 0.40 - 4.50 mIU/L   Vitamin D 25-Hydroxy,Total (for eval of Vitamin D levels)   Result Value Ref Range    VITAMIN D,25-OH,TOTAL,IA 49 30 - 100 ng/mL      Comment:      Vitamin D Status         25-OH Vitamin D:     Deficiency:                    <20 ng/mL  Insufficiency:             20 - 29 ng/mL  Optimal:                 > or = 30 ng/mL     For 25-OH Vitamin D testing on patients on   D2-supplementation and patients for whom quantitation   of D2 and D3 fractions is required, the QuestAssureD(TM)  25-OH VIT D, (D2,D3), LC/MS/MS is recommended: order   code 67460 (patients >2yrs).     See Note 1     Note 1     For additional information, please refer to   http://education.[employer].CA/faq/RUS173   (This link is being provided for informational/  educational purposes only.)     Hemoglobin A1c   Result Value Ref Range    HEMOGLOBIN A1c 7.0 (H) <5.7 %      Comment:      For someone without known diabetes, a hemoglobin A1c  value of 6.5% or greater indicates that they may have   diabetes and this should be confirmed with a follow-up   test.     For someone with known diabetes, a value <7% indicates   that their diabetes is well controlled and a value   greater than or equal to 7% indicates suboptimal   control. A1c targets should be individualized based on   duration of diabetes, age, comorbid conditions, and   other considerations.     Currently, no consensus exists regarding use of  hemoglobin A1c for diagnosis of diabetes for  children.          eAG (mg/dL) 154 mg/dL    eAG (mmol/L) 8.5 mmol/L       9:27 AM      Results were successfully communicated with the patient and they acknowledged their understanding.  Pt encouraged to increase fluid intake and watch sugar and carb intake. Pt under a lot of stress due to pain and hasn't been able to eat as well as usual.  Angela Dyer, APRN-CNP

## 2025-05-13 NOTE — TELEPHONE ENCOUNTER
..  We just had a telehealth visit with her on 5/7/2.  She had her [facility] Aria infusions since 2/11/2025.  She has difficulty with rotation and she did not have anyone to help get her there and she has difficulty standing.  She had Steroid injections with pain management April 10.  Still has a lot of severe back pain  She has an MRI ordered by Dr. Rock but has difficulty getting there.  She does not know how she would be able to get there and walk.  She would be able to use a wheelchair by herself.  Will put in for prednisone taper.    She will ask her neighbor to get this for her.    She is unable to afford Simponi injections that she would do at home.  May have other problems going on with her spotting currently.  She is also on narcotic therapy

## 2025-05-14 ENCOUNTER — TELEPHONE (OUTPATIENT)
Dept: RHEUMATOLOGY | Facility: CLINIC | Age: 70
End: 2025-05-14

## 2025-05-14 ENCOUNTER — TELEPHONE (OUTPATIENT)
Dept: PRIMARY CARE | Facility: CLINIC | Age: 70
End: 2025-05-14
Payer: MEDICARE

## 2025-05-14 DIAGNOSIS — R39.9 UTI SYMPTOMS: Primary | ICD-10-CM

## 2025-05-14 RX ORDER — CEPHALEXIN 500 MG/1
500 CAPSULE ORAL 2 TIMES DAILY
Qty: 20 CAPSULE | Refills: 0 | Status: SHIPPED | OUTPATIENT
Start: 2025-05-14 | End: 2025-05-24

## 2025-05-15 ENCOUNTER — APPOINTMENT (OUTPATIENT)
Dept: INFUSION THERAPY | Facility: CLINIC | Age: 70
End: 2025-05-15
Payer: MEDICARE

## 2025-05-19 ENCOUNTER — TELEPHONE (OUTPATIENT)
Dept: PRIMARY CARE | Facility: CLINIC | Age: 70
End: 2025-05-19
Payer: MEDICARE

## 2025-05-19 DIAGNOSIS — R52 PAIN AGGRAVATED BY PHYSICAL ACTIVITY: Primary | ICD-10-CM

## 2025-05-20 ENCOUNTER — APPOINTMENT (OUTPATIENT)
Dept: INFUSION THERAPY | Facility: CLINIC | Age: 70
End: 2025-05-20
Payer: MEDICARE

## 2025-05-20 VITALS
TEMPERATURE: 97.4 F | SYSTOLIC BLOOD PRESSURE: 101 MMHG | DIASTOLIC BLOOD PRESSURE: 72 MMHG | HEART RATE: 100 BPM | RESPIRATION RATE: 18 BRPM

## 2025-05-20 DIAGNOSIS — L40.50 PSORIATIC ARTHRITIS (MULTI): ICD-10-CM

## 2025-05-20 PROCEDURE — 96523 IRRIG DRUG DELIVERY DEVICE: CPT | Performed by: NURSE PRACTITIONER

## 2025-05-20 RX ORDER — HEPARIN 100 UNIT/ML
500 SYRINGE INTRAVENOUS AS NEEDED
OUTPATIENT
Start: 2025-05-20

## 2025-05-20 RX ORDER — HEPARIN 100 UNIT/ML
500 SYRINGE INTRAVENOUS AS NEEDED
Status: DISCONTINUED | OUTPATIENT
Start: 2025-05-20 | End: 2025-05-20 | Stop reason: HOSPADM

## 2025-05-20 RX ORDER — HEPARIN SODIUM,PORCINE/PF 10 UNIT/ML
50 SYRINGE (ML) INTRAVENOUS AS NEEDED
OUTPATIENT
Start: 2025-05-20

## 2025-05-20 RX ADMIN — HEPARIN 500 UNITS: 100 SYRINGE at 14:57

## 2025-05-20 ASSESSMENT — ENCOUNTER SYMPTOMS
EXTREMITY WEAKNESS: 1
LEG SWELLING: 0
PALPITATIONS: 0
ARTHRALGIAS: 1
WHEEZING: 0
WOUND: 0
DIZZINESS: 0
NUMBNESS: 0
LIGHT-HEADEDNESS: 0
COUGH: 0
SHORTNESS OF BREATH: 0

## 2025-05-20 ASSESSMENT — PAIN SCALES - GENERAL: PAINLEVEL_OUTOF10: 10-WORST PAIN EVER

## 2025-05-20 NOTE — PATIENT INSTRUCTIONS
Today :We administered heparin flush.     For:   1. Psoriatic arthritis (Multi)         Your next appointment is due in:  6/20/2025 for [facility]        Please read the  Medication Guide that was given to you and reviewed during todays visit.     (Tell all doctors including dentists that you are taking this medication)     Go to the emergency room or call 911 if:  -You have signs of allergic reaction:   -Rash, hives, itching.   -Swollen, blistered, peeling skin.   -Swelling of face, lips, mouth, tongue or throat.   -Tightness of chest, trouble breathing, swallowing or talking     Call your doctor:  - If IV / injection site gets red, warm, swollen, itchy or leaks fluid or pus.     (Leave dressing on your IV site for at least 2 hours and keep area clean and dry  - If you get sick or have symptoms of infection or are not feeling well for any reason.    (Wash your hands often, stay away from people who are sick)  - If you have side effects from your medication that do not go away or are bothersome.     (Refer to the teaching your nurse gave you for side effects to call your doctor about)    - Common side effects may include:  stuffy nose, headache, feeling tired, muscle aches, upset stomach  - Before receiving any vaccines     - Call the [facility] at   If:  - You get sick, are on antibiotics, have had a recent vaccine, have surgery or dental work and your doctor wants your visit rescheduled.  - You need to cancel and reschedule your visit for any reason. Call at least 2 days before your visit if you need to cancel.   - Your insurance changes before your next visit.    (We will need to get approval from your new insurance. This can take up to two weeks.)     The [facility] is opened Monday thru Friday. We are closed on weekends and holidays.   Voice mail will take your call if the center is closed. If you leave a message please allow 24 hours for a call back during weekdays. If you leave  a message on a weekend/holiday, we will call you back the next business day.    A pharmacist is available Monday - Friday from 8:30AM to 3:30PM to help answer any questions you may have about your prescriptions(s). Please call pharmacy at:    [facility]: [phone]  [facility]: [phone]  [facility]: [phone]

## 2025-05-20 NOTE — PROGRESS NOTES
[facility]   Infusion Clinic Note   Date: May 20, 2025   Name: Nesha Mcleod  : 1955   MRN: [id number]         Reason for Visit: OP Infusion (Port flush)         Today: We administered heparin flush.       Ordered By: Dr Valdes      For a Diagnosis of: Psoriatic arthritis (Multi)       At today's visit patient accompanied by: Relative      Today's Vitals:   Vitals:    25 1441   BP: 101/72   Pulse: 100   Resp: 18   Temp: 36.3 °C (97.4 °F)   PainSc: 10-Worst pain ever   PainLoc: Generalized             Pre - Treatment Checklist:      - Previous reaction to current treatment: no            Pain: 10   - Is the pain different from normal: no   - Is prescribing Doctor aware:  yes      Labs: N/A      Fall Risk Screening: Fan Fall Risk  History of Falling, Immediate or Within 3 Months: Yes  Secondary Diagnosis: Yes  Ambulatory Aid: Crutches/cane/walker  Intravenous Therapy/Heparin Lock: No  Gait/Transferring: Weak  Mental Status: Oriented to own ability  Fan Fall Risk Score: 65       Review Of Systems:  Review of Systems   Respiratory:  Negative for cough, shortness of breath and wheezing.    Cardiovascular:  Negative for chest pain, leg swelling and palpitations.   Musculoskeletal:  Positive for arthralgias (brace to left knee.) and gait problem (uses rollator at home, arrived via w/c to [facility]).   Skin:  Negative for itching, rash and wound.   Neurological:  Positive for extremity weakness and gait problem (uses rollator at home, arrived via w/c to [facility]). Negative for dizziness, light-headedness and numbness.        Complains of multiple falls, related to weakness         Infusion Readiness:  - Assessment Concerns Related to Infusion: No  - Provider notified: n/a      New Patient Education:    N/A (returning patient for continuation of therapy. Ongoing education provided as needed.)        Treatment Conditions & Drug Specific Questions:    NOT APPLICABLE        Weight Based Drug  Calculations:    WEIGHT BASED DRUGS: NOT APPLICABLE / FLAT DOSE       Post Treatment: Patient tolerated treatment without issue and was discharged in no apparent distress.      Note Authored / Patient Cared for By: Perla Simon RN   _________________________________________________________________________    Note authored and patient cared for by: Perla Simon RN   Note/Encounter reviewed by: Yaa CORDON NP. This provider on site at time of patient encounter. Staff to notify this provider of any questions, concerns, abnormals or issues during encounter.

## 2025-05-21 ENCOUNTER — TELEPHONE (OUTPATIENT)
Dept: PRIMARY CARE | Facility: CLINIC | Age: 70
End: 2025-05-21
Payer: MEDICARE

## 2025-05-21 NOTE — TELEPHONE ENCOUNTER
Reviewed results with pt, pt going to try to get into her orthopedic surgeon tomorrow or will go to the ER tomorrow for evaluation.

## 2025-05-21 NOTE — TELEPHONE ENCOUNTER
Patient calling to inquire regarding results of xrays taken on Monday.     Results obtained from [facility] on line sight as follows:     Tibia and Fibula 2V, LEFT  Results: Distal tibia fracture with no displacement.  No significant joint malalignment.  Mild soft tissue swelling.     Conclusion: Acute appearing distal tibia fracture as noted.     ANKLE 2V, LEFT    Results: distal tibia fracture with no displacement.  No significant joint malalignment. Mild soft tissue swelling.     Conclusion: Acute appearing distal tibia fracture as noted.

## 2025-05-22 ENCOUNTER — HOSPITAL ENCOUNTER (EMERGENCY)
Facility: HOSPITAL | Age: 70
Discharge: HOME | End: 2025-05-22
Attending: EMERGENCY MEDICINE
Payer: MEDICARE

## 2025-05-22 ENCOUNTER — APPOINTMENT (OUTPATIENT)
Dept: RADIOLOGY | Facility: HOSPITAL | Age: 70
End: 2025-05-22
Payer: MEDICARE

## 2025-05-22 VITALS
HEART RATE: 67 BPM | TEMPERATURE: 99.9 F | RESPIRATION RATE: 17 BRPM | BODY MASS INDEX: 34.63 KG/M2 | HEIGHT: 58 IN | OXYGEN SATURATION: 98 % | SYSTOLIC BLOOD PRESSURE: 132 MMHG | DIASTOLIC BLOOD PRESSURE: 88 MMHG | WEIGHT: 165 LBS

## 2025-05-22 DIAGNOSIS — R52 PAIN AGGRAVATED BY PHYSICAL ACTIVITY: ICD-10-CM

## 2025-05-22 DIAGNOSIS — S92.355A NONDISPLACED FRACTURE OF FIFTH METATARSAL BONE, LEFT FOOT, INITIAL ENCOUNTER FOR CLOSED FRACTURE: Primary | ICD-10-CM

## 2025-05-22 PROCEDURE — 73590 X-RAY EXAM OF LOWER LEG: CPT | Mod: LT

## 2025-05-22 PROCEDURE — 99284 EMERGENCY DEPT VISIT MOD MDM: CPT | Performed by: EMERGENCY MEDICINE

## 2025-05-22 PROCEDURE — 73630 X-RAY EXAM OF FOOT: CPT | Mod: LT

## 2025-05-22 PROCEDURE — 73590 X-RAY EXAM OF LOWER LEG: CPT | Mod: LEFT SIDE | Performed by: RADIOLOGY

## 2025-05-22 PROCEDURE — 73630 X-RAY EXAM OF FOOT: CPT | Mod: LEFT SIDE | Performed by: RADIOLOGY

## 2025-05-22 PROCEDURE — 2500000001 HC RX 250 WO HCPCS SELF ADMINISTERED DRUGS (ALT 637 FOR MEDICARE OP): Performed by: EMERGENCY MEDICINE

## 2025-05-22 RX ORDER — OXYCODONE AND ACETAMINOPHEN 5; 325 MG/1; MG/1
1 TABLET ORAL EVERY 6 HOURS PRN
Qty: 5 TABLET | Refills: 0 | Status: SHIPPED | OUTPATIENT
Start: 2025-05-22 | End: 2025-05-25

## 2025-05-22 RX ORDER — ACETAMINOPHEN 325 MG/1
975 TABLET ORAL ONCE
Status: COMPLETED | OUTPATIENT
Start: 2025-05-22 | End: 2025-05-22

## 2025-05-22 RX ADMIN — ACETAMINOPHEN 975 MG: 325 TABLET ORAL at 11:24

## 2025-05-22 ASSESSMENT — PAIN DESCRIPTION - PAIN TYPE
TYPE: ACUTE PAIN
TYPE: ACUTE PAIN

## 2025-05-22 ASSESSMENT — PAIN DESCRIPTION - ORIENTATION
ORIENTATION: LEFT
ORIENTATION: LEFT

## 2025-05-22 ASSESSMENT — PAIN DESCRIPTION - LOCATION
LOCATION: LEG
LOCATION: LEG

## 2025-05-22 ASSESSMENT — PAIN DESCRIPTION - FREQUENCY: FREQUENCY: CONSTANT/CONTINUOUS

## 2025-05-22 ASSESSMENT — COLUMBIA-SUICIDE SEVERITY RATING SCALE - C-SSRS
1. IN THE PAST MONTH, HAVE YOU WISHED YOU WERE DEAD OR WISHED YOU COULD GO TO SLEEP AND NOT WAKE UP?: NO
6. HAVE YOU EVER DONE ANYTHING, STARTED TO DO ANYTHING, OR PREPARED TO DO ANYTHING TO END YOUR LIFE?: NO
2. HAVE YOU ACTUALLY HAD ANY THOUGHTS OF KILLING YOURSELF?: NO

## 2025-05-22 ASSESSMENT — PAIN DESCRIPTION - DESCRIPTORS
DESCRIPTORS: SHARP;STABBING
DESCRIPTORS: STABBING

## 2025-05-22 ASSESSMENT — PAIN SCALES - GENERAL
PAINLEVEL_OUTOF10: 8
PAINLEVEL_OUTOF10: 10 - WORST POSSIBLE PAIN

## 2025-05-22 ASSESSMENT — PAIN - FUNCTIONAL ASSESSMENT
PAIN_FUNCTIONAL_ASSESSMENT: 0-10
PAIN_FUNCTIONAL_ASSESSMENT: 0-10

## 2025-05-22 NOTE — PROGRESS NOTES
Attestation/Supervisory note for LYNN Raymundo      The patient is a 70-year-old female presenting to the emergency department, reportedly at the direction of her primary care provider, for evaluation of a fracture of her lower extremity.  She states that last Wednesday she tripped and fell.  She states that she has a chronic dropped foot of her lower extremity and she states that this causes her to trip.  She states that she was using her rollator but her foot got caught up and this caused her to fall.  She states that she did not hit her head or lose consciousness.  No neck or back pain.  No headache or visual changes.  No chest pain or shortness of breath.  No abdominal pain.  No nausea or vomiting.  No diarrhea or constipation.  No urinary complaints.  No fever or chills.  No cough or congestion.  She states that she has had some pain with walking and standing in the left lower extremity.  She states that she had x-rays performed several days ago because of this and was told that she had a fracture.  She states that she was sent to the emergency room today because of that.  All pertinent positives and negatives are recorded above.  All other systems reviewed and otherwise negative.  Vital signs with borderline tachycardia but otherwise within normal limits.  Physical exam with a well-nourished well-developed female in no acute distress.  HEENT exam within normal limits.  She has no evidence of airway compromise or respiratory distress.  Abdominal exam is benign.  She does not have any gross motor, neurologic or vascular deficits on exam other than she does have some pain with palpation and range of motion of the left foot and ankle.      Oral acetaminophen ordered      XR tibia fibula left 2 views   Final Result   No acute abnormality seen about the tibia/fibula.   Mild degenerative changes in the ankle. Large Achilles enthesophyte.             MACRO:   None        Signed by: Jayy Marx 5/22/2025 2:37 PM    Dictation workstation:   [id number]      XR foot left 3+ views   Final Result   Transverse lucency is present base of the fifth metatarsal and   suggestive of a nondisplaced fracture. Recommend correlation with site   of pain.   Left foot soft tissue swelling.   Prior resection of the distal fifth metatarsal head and prior   bunionectomy.   Signed by Brendon Garcia MD         The patient does not have any gross motor, neurologic or vascular deficits on exam.  No palpable bony deformity on exam.  X-rays of the left tib-fib show no evidence of fracture or dislocation.  There is evidence of degenerative changes in the ankle.  The x-ray of the left foot shows transverse lucency present at the base of the fifth metatarsal and suggestive of a nondisplaced fracture.  The patient reportedly had x-rays showing tibia fracture previously but this is not reflected on the diagnostic imaging today.  The patient was placed in a posterior splint with a stirrup by nursing staff.  She was neurovascular intact after splint application.        The patient was released in good condition.  She will follow-up with her primary care physician within 1 to 2 days for further management of her current symptoms and repeat check of her blood pressure.  She was also given a referral to podiatry for further management of her injury.  She will return to the emergency department sooner with worsening of symptoms or onset of new symptoms         Impression/diagnosis:  Fall from standing height, initial encounter  Left fifth metatarsal fracture  Diastolic hypertension      I personally saw the patient and made/approve the management plan and take responsibility for the patient management.      I personally discussed the patient's management with the patient      I reviewed the results of the diagnostic imaging.  Formal radiology read was completed by the radiologist.      Yael Baez MD

## 2025-05-22 NOTE — ED PROVIDER NOTES
HPI   Chief Complaint   Patient presents with    Leg Pain     Presents to ed with left leg pain. Had a mechanical fall on Wednesday, seen by ortho and had confirmed a distal tibia fx. Leg is swollen and tender. Msps pos. Hx of drop foot of left leg        HPI  Patient is a 70-year-old female presenting for evaluation of left leg pain status post fall last Wednesday.  Patient states the pain is in her lower leg and does extend just distal to the knee down to the ankle.  She states she has been walking on the leg but it is painful.  States she did have an x-ray performed which was positive for a distal tibia fracture thus her physician sent her in for further evaluation and splint placement.  She denies any numbness or tingling of the extremity.  She otherwise did not injure herself denies head injury or loss of consciousness.  Has no other acute complaints.      Patient History   Medical History[1]  Surgical History[2]  Family History[3]  Social History[4]    Physical Exam   ED Triage Vitals [05/22/25 1032]   Temperature Heart Rate Respirations BP   37.7 °C (99.9 °F) (!) 102 16 110/74      SpO2 Temp Source Heart Rate Source Patient Position   -- Tympanic -- Sitting      BP Location FiO2 (%)     Right arm --       Physical Exam  Vitals and nursing note reviewed.   Constitutional:       General: She is not in acute distress.  Musculoskeletal:      Comments: No obvious injury or deformity of the left leg, no edema, tenderness to palpation over the left distal medial and lateral leg without obvious injury   Skin:     General: Skin is warm and dry.      Comments: Dorsalis pedis pulse palpable +2, capillary refill 2 seconds, intact sensation of the lower extremity   Neurological:      Mental Status: She is alert.           ED Course & MDM   Diagnoses as of 05/22/25 1942   Nondisplaced fracture of fifth metatarsal bone, left foot, initial encounter for closed fracture                 No data recorded     C[place]on Coma Scale  Score: 15 (05/22/25 1035 : Cesar Hayes, EMT)                           Medical Decision Making  Parts of this chart have been completed using voice recognition software. Please excuse any errors of transcription.  My thought process and reason for plan has been formulated from the time that I saw the patient until the time of disposition and is not specific to one specific moment during their visit and furthermore my MDM encompasses this entire chart and not only this text box.      HPI: Detailed above.    Exam: A medically appropriate exam performed, outlined above, given the known history and presentation.    History obtained from: Patient    Medications given during visit:  Medications   acetaminophen (Tylenol) tablet 975 mg (975 mg oral Given 5/22/25 1124)        Diagnostic/tests  Labs Reviewed - No data to display   XR tibia fibula left 2 views   Final Result   No acute abnormality seen about the tibia/fibula.   Mild degenerative changes in the ankle. Large Achilles enthesophyte.             MACRO:   None        Signed by: Jayy Marx 5/22/2025 2:37 PM   Dictation workstation:   [id number]      XR foot left 3+ views   Final Result   Transverse lucency is present base of the fifth metatarsal and   suggestive of a nondisplaced fracture. Recommend correlation with site   of pain.   Left foot soft tissue swelling.   Prior resection of the distal fifth metatarsal head and prior   bunionectomy.   Signed by Brendon Garcia MD           Considerations/further MDM:  Patient is a 70-year-old female presenting for evaluation of left leg pain    Patient awake alert nontoxic-appearing.  Left lower extremity is neurovascularly intact without obvious injury or deformity on exam.  X-ray was performed with evidence of transverse lucency at the base of the fifth metatarsal suggestive of nondisplaced fracture and left foot soft tissue swelling.  Otherwise no injury.  Patient was placed in a posterior short leg splint  by nursing staff.  On my reassessment the patient is neurovascularly intact.  Advised nonweightbearing of the left lower extremity.  Advised RICE.  Referral to orthopedic surgery provided.  Released in good condition.          Procedure  Procedures       [1]   Past Medical History:  Diagnosis Date    Anxiety disorder, unspecified     Anxiety    Arthritis     Arthropathic psoriasis, unspecified (Multi)     Psoriatic arthropathy    Chronic back pain     DM type 2 (diabetes mellitus, type 2) (Multi)     GERD (gastroesophageal reflux disease)     Hypertension     Injury of head 05/09/2024    Other chronic pain     Chronic pain    Other conditions influencing health status     Osteoarthritis    Personal history of diseases of the skin and subcutaneous tissue     History of psoriasis    Personal history of other diseases of the musculoskeletal system and connective tissue     History of fibromyositis    Personal history of other endocrine, nutritional and metabolic disease     History of hypothyroidism   [2]   Past Surgical History:  Procedure Laterality Date    APPENDECTOMY      Appendectomy    BACK SURGERY      x7    BREAST SURGERY Bilateral     Breast Surgery Reduction Procedure    CARPAL TUNNEL RELEASE      Neuroplasty Decompression Median Nerve At Carpal Tunnel    CARPAL TUNNEL RELEASE      Neuroplasty Decompression Median Nerve At Carpal Tunnel    CERVICAL FUSION      CHOLECYSTECTOMY      Cholecystectomy    ESOPHAGOGASTRODUODENOSCOPY      EYE SURGERY      Eye Surgery    FOOT SURGERY      Foot Surgery    GALLBLADDER SURGERY      Gallbladder Surgery    GASTRIC BYPASS      HERNIA REPAIR      Hernia Repair    HYSTERECTOMY      Hysterectomy    INCISIONAL HERNIA REPAIR  09/24/2020    with mesh    KNEE ARTHROSCOPY W/ DEBRIDEMENT      Arthroscopy Knee Left    KNEE ARTHROSCOPY W/ DEBRIDEMENT      Arthroscopy Knee Right    KNEE SURGERY      Knee Surgery    KNEE SURGERY      Knee Surgery    LIPECTOMY  01/2014    PANNICULECTOMY     LIPECTOMY      NECK SURGERY      Neck Surgery    OTHER SURGICAL HISTORY      Dental Surgery    OTHER SURGICAL HISTORY      Nerve Block Transforaminal Epidural    OTHER SURGICAL HISTORY      Blepharoplasty    OTHER SURGICAL HISTORY  10/13/2016    Rectovaginal Fistula Repair - trasabdomnial - Dr. Pelaez    SHOULDER SURGERY      Shoulder Surgery    SHOULDER SURGERY      Shoulder Surgery    TONSILLECTOMY      Tonsillectomy    TOTAL KNEE ARTHROPLASTY Left     Knee Replacement    TOTAL KNEE ARTHROPLASTY      Knee Replacement   [3]   Family History  Problem Relation Name Age of Onset    Hypertension Mother      Asthma Mother      Arthritis Father      Heart disease Father      Arthritis Brother      Hypertension Brother      Hypertension Brother      Lymphoma Maternal Grandmother      Heart disease Paternal Grandfather     [4]   Social History  Tobacco Use    Smoking status: Never     Passive exposure: Never    Smokeless tobacco: Never   Substance Use Topics    Alcohol use: Defer    Drug use: Defer        Shannen Raymundo PA-C  05/22/25 1943

## 2025-05-22 NOTE — DISCHARGE INSTRUCTIONS
You were seen in the ER for left leg pain.  Your x-ray is significant for a fracture of the left foot.  Follow-up with podiatry for further assessment of your fracture of your left foot.  Please keep your splint clean and dry.  Take Percocet as needed for moderate to severe pain.  Use Tylenol and Motrin for mild to moderate pain.    It is important to remember that your care does not end here and you must continue to monitor your condition closely. Please return to the emergency department for any worsening or concerning signs or symptoms as directed by our conversations and the discharge instructions. If you do not have a doctor please contact the referral number on your discharge instructions. Please contact any physician specialists provided in your discharge notes as it is very important to follow up with them regarding your condition. If you are unable to reach the physicians provided, please come back to the Emergency Department at any time.

## 2025-05-23 ENCOUNTER — TELEPHONE (OUTPATIENT)
Dept: PRIMARY CARE | Facility: CLINIC | Age: 70
End: 2025-05-23
Payer: MEDICARE

## 2025-05-23 NOTE — TELEPHONE ENCOUNTER
"Patient was Treated and released 5/22/25 from [facility] ED. Per ED note: X-rays of the left tib-fib show no evidence of fracture or dislocation. There is evidence of degenerative changes in the ankle. The x-ray of the left foot shows transverse lucency present at the base of the fifth metatarsal and suggestive of a nondisplaced fracture. The patient reportedly had x-rays showing tibia fracture previously but this is not reflected on the diagnostic imaging today. The patient was placed in a posterior splint with a stirrup by nursing staff. She was neurovascular intact after splint application.   Phoned patient, offered to schedule a [facility] visit and she declined at this time as she is following up with her podiatrist on Tuesday afternoon. She reported she in unable to be seen sooner because that is when she has someone available to drive her. She reported she spoke to the podiatry office today and reported the \"splint felt tight\" and they advised her \"to stay off of it\". Patient in agreement to contact the [facility] office as needed in the interim.   "

## 2025-05-28 ENCOUNTER — TELEPHONE (OUTPATIENT)
Dept: PRIMARY CARE | Facility: CLINIC | Age: 70
End: 2025-05-28
Payer: MEDICARE

## 2025-05-28 NOTE — TELEPHONE ENCOUNTER
"Pt asking to speak to provider. She states she went to ER after home xr and they put her in a splint. She states she saw podiatrist yesterday who advised her pain seems to be in her ankle and put pt in a \"boot\". Pt states she is having a lot of pain in her left leg.  "

## 2025-05-29 ENCOUNTER — OFFICE VISIT (OUTPATIENT)
Dept: PRIMARY CARE | Facility: CLINIC | Age: 70
End: 2025-05-29
Payer: MEDICARE

## 2025-05-29 VITALS
SYSTOLIC BLOOD PRESSURE: 118 MMHG | TEMPERATURE: 100 F | OXYGEN SATURATION: 94 % | DIASTOLIC BLOOD PRESSURE: 70 MMHG | RESPIRATION RATE: 18 BRPM | HEART RATE: 75 BPM

## 2025-05-29 DIAGNOSIS — M79.89 PAIN AND SWELLING OF LEFT LOWER LEG: Primary | ICD-10-CM

## 2025-05-29 DIAGNOSIS — M79.662 PAIN AND SWELLING OF LEFT LOWER LEG: Primary | ICD-10-CM

## 2025-05-29 PROCEDURE — 4010F ACE/ARB THERAPY RXD/TAKEN: CPT | Performed by: NURSE PRACTITIONER

## 2025-05-29 PROCEDURE — 3074F SYST BP LT 130 MM HG: CPT | Performed by: NURSE PRACTITIONER

## 2025-05-29 PROCEDURE — 1159F MED LIST DOCD IN RCRD: CPT | Performed by: NURSE PRACTITIONER

## 2025-05-29 PROCEDURE — 3078F DIAST BP <80 MM HG: CPT | Performed by: NURSE PRACTITIONER

## 2025-05-29 PROCEDURE — 1125F AMNT PAIN NOTED PAIN PRSNT: CPT | Performed by: NURSE PRACTITIONER

## 2025-05-29 PROCEDURE — 1160F RVW MEDS BY RX/DR IN RCRD: CPT | Performed by: NURSE PRACTITIONER

## 2025-05-29 PROCEDURE — 99348 HOME/RES VST EST LOW MDM 30: CPT | Performed by: NURSE PRACTITIONER

## 2025-05-29 RX ORDER — PREDNISONE 10 MG/1
TABLET ORAL
Qty: 30 TABLET | Refills: 0 | Status: SHIPPED | OUTPATIENT
Start: 2025-05-29 | End: 2025-06-08

## 2025-05-29 ASSESSMENT — ENCOUNTER SYMPTOMS
WEAKNESS: 1
BACK PAIN: 1
JOINT SWELLING: 1
ARTHRALGIAS: 1
SHORTNESS OF BREATH: 1
EYES NEGATIVE: 1
NECK PAIN: 1
CONSTIPATION: 1
RHINORRHEA: 1
APPETITE CHANGE: 1
ACTIVITY CHANGE: 1
FATIGUE: 1
LIGHT-HEADEDNESS: 1
MYALGIAS: 1
PSYCHIATRIC NEGATIVE: 1
NECK STIFFNESS: 1
NUMBNESS: 1
FREQUENCY: 1

## 2025-05-29 ASSESSMENT — PAIN SCALES - GENERAL: PAINLEVEL_OUTOF10: 10-WORST PAIN EVER

## 2025-05-29 NOTE — PROGRESS NOTES
Subjective   Patient ID: Nesha Mcleod is a 70 y.o. female who for acute visit for left leg pain..    HPI Pt seen in single family apartment unaccompanied. PMHx: Chronic pain, DDD, HTN, HLD, DM, OA, Hypothyroid, Psoriatic arthritis, GERD. Pt seen lying back in recliner with legs elevated. Pt alert and oriented times three and cooperative with examination. Pt requested visit to increasing left leg pain and difficulty putting any weight on it. Pt went to ER on 5/22/25 after home Xray showed non displaced fracture of left tibia which did not show up on ER Xrays but a non displaced fracture of 5th metatarsal was seen and was placed in a splint Pt followed with podiatry on and was placed in walking boot. Pt to follow up with podiatry in 1 month with  Dr. Stock. Pt continues with pain in left outer side of ankle and anterior portion of shin bone. Pt having difficulty putting any weight on leg and walking. Pt received foot brace for left foot drop but has not worn it yet other than the day she got it which she felt helped her balance. Pt states pain pills not helping at much, taking the edge off. Pt only able to get up when she has to go to the bathroom and to get herself something to eat. Pt with slight edema to left ankle. No redness or ecchymosis noted. Pt told she may need to follow up with orthopedics if steriod taper that is going to be ordered does not help. Pt has seen Dr. Moran in hte past. Pt reports energy as having none due to the pain. Pts appeite is poor and is active with meals on wheels again due to difficulty getting meals. Pt eating 2 meals a day. Pt reports sleep as poor and is sleeping in her recliner as she is having difficulty getting into bed. Pt recently started on Cymbalta for nerve pain. Pt with no other concerns or complaints.  Home Visit medically necessary due to: pt has chronic condition that makes access to a traditional office visit very difficult, illness or condition that results in  activity limitation or restriction that impacts the ability to leave home such as; unsteady gait/ poor condition.     Review of Systems   Constitutional:  Positive for activity change, appetite change and fatigue.   HENT:  Positive for congestion and rhinorrhea.    Eyes: Negative.    Respiratory:  Positive for shortness of breath (LUGO).    Gastrointestinal:  Positive for constipation.   Endocrine: Positive for cold intolerance.   Genitourinary:  Positive for frequency and urgency.   Musculoskeletal:  Positive for arthralgias (left ankle, left foot, left lower leg), back pain, gait problem, joint swelling, myalgias (left leg), neck pain and neck stiffness.   Skin: Negative.    Neurological:  Positive for weakness, light-headedness and numbness (feet).   Psychiatric/Behavioral: Negative.         Objective   /70 (BP Location: Right arm, Patient Position: Lying, BP Cuff Size: Adult)   Pulse 75   Temp 37.8 °C (100 °F) (Temporal)   Resp 18   SpO2 94%     Current Medications[1]      Physical Exam  Constitutional:       Appearance: Normal appearance. She is obese.   HENT:      Head: Normocephalic and atraumatic.      Nose: Nose normal.      Mouth/Throat:      Mouth: Mucous membranes are moist.      Pharynx: Oropharynx is clear.   Eyes:      Conjunctiva/sclera: Conjunctivae normal.      Pupils: Pupils are equal, round, and reactive to light.   Cardiovascular:      Rate and Rhythm: Normal rate and regular rhythm.      Pulses: Normal pulses.      Heart sounds: Normal heart sounds.   Pulmonary:      Effort: Pulmonary effort is normal.      Breath sounds: Examination of the right-lower field reveals decreased breath sounds. Examination of the left-lower field reveals decreased breath sounds. Decreased breath sounds present.   Abdominal:      General: Bowel sounds are normal.      Palpations: Abdomen is soft.   Musculoskeletal:         General: Tenderness (left leg) and signs of injury (left leg) present.      Cervical  back: Normal range of motion and neck supple.      Left lower leg: Tenderness and bony tenderness present. Edema (trace ankle edema) present.      Left ankle: Tenderness present.   Skin:     General: Skin is warm and dry.      Capillary Refill: Capillary refill takes 2 to 3 seconds.   Neurological:      Mental Status: She is alert and oriented to person, place, and time.      Motor: Weakness present.      Gait: Gait abnormal.   Psychiatric:         Mood and Affect: Mood normal.         Behavior: Behavior normal.         Thought Content: Thought content normal.         Judgment: Judgment normal.     Problem List[2]      Assessment/Plan   Diagnoses and all orders for this visit:  Pain and swelling of left lower leg  Comments:  Prednisone taper, Pt to folllow with ortho if pain not relieved after steroid taper. Pt to limit mobilty and wear left foot walking boot.  Orders:  -     predniSONE (Deltasone) 10 mg tablet; Take 5 tablets (50 mg) by mouth once daily for 2 days, THEN 4 tablets (40 mg) once daily for 2 days, THEN 3 tablets (30 mg) once daily for 2 days, THEN 2 tablets (20 mg) once daily for 2 days, THEN 1 tablet (10 mg) once daily for 2 days.            [1]   Current Outpatient Medications:     aspirin 81 mg chewable tablet, Chew 1 tablet (81 mg) once daily. HOLD IF GIVEN IN LAST 24 HOURS OR IF HISTORY OF HYPERSENSITIVITY., Disp: , Rfl:     atorvastatin (Lipitor) 40 mg tablet, TAKE 1 TABLET BY MOUTH ONCE  DAILY, Disp: 90 tablet, Rfl: 3    buPROPion SR (Wellbutrin SR) 100 mg 12 hr tablet, TAKE 1 TABLET BY MOUTH ONCE  DAILY DO NOT CRUSH, CHEW, OR  SPLIT, Disp: 90 tablet, Rfl: 3    cinnamon bark extract 500 mg tablet, Cinnamon 500 MG as directed Orally daily Active, Disp: , Rfl:     cyclobenzaprine (Flexeril) 10 mg tablet, Take 1 tablet (10 mg) by mouth 2 times a day as needed for muscle spasms., Disp: 60 tablet, Rfl: 2    DULoxetine (Cymbalta) 30 mg DR capsule, Take 1 capsule (30 mg) by mouth once daily. Do not  crush or chew., Disp: 30 capsule, Rfl: 11    gabapentin (Neurontin) 300 mg capsule, Take 1 capsule (300 mg) by mouth 2 times a day. Do not fill before March 2, 2025., Disp: 60 capsule, Rfl: 2    HYDROmorphone (Dilaudid) 2 mg tablet, Take 1 tablet (2 mg) by mouth 3 times a day as needed for severe pain (7 - 10) for up to 28 days. Do not fill before May 26, 2025., Disp: 84 tablet, Rfl: 0    leflunomide (Arava) 10 mg tablet, TAKE 1 TABLET BY MOUTH ONCE  DAILY, Disp: 90 tablet, Rfl: 3    levothyroxine (Synthroid, Levoxyl) 75 mcg tablet, TAKE 1 TABLET BY MOUTH ONCE  DAILY IN THE MORNING BEFORE A  MEAL, Disp: 90 tablet, Rfl: 3    magnesium oxide (Mag-Ox) 250 mg magnesium tablet, Take 1 tablet (250 mg) by mouth once daily., Disp: , Rfl:     metFORMIN (Glucophage) 500 mg tablet, TAKE 1 TABLET BY MOUTH TWICE  DAILY WITH MEALS, Disp: 180 tablet, Rfl: 3    multivit-minerals/folic acid (CENTRUM ADULTS ORAL), Take 1 tablet by mouth once daily., Disp: , Rfl:     naloxone (Narcan) 4 mg/0.1 mL nasal spray, Administer 1 spray (4 mg) into affected nostril(s) if needed for opioid reversal or respiratory depression. May repeat every 2-3 minutes if needed, alternating nostrils, until medical assistance becomes available., Disp: 2 each, Rfl: 0    potassium gluconate 595 mg (99 mg) tablet, Take 1 tablet by mouth once daily., Disp: , Rfl:     sodium chloride 0.9% parenteral solution with golimumab 12.5 mg/mL solution 2 mg/kg, Infuse 2 mg/kg into a venous catheter every 8 (eight) weeks., Disp: , Rfl:     acetaminophen (Tylenol) 325 mg tablet, Take 2 tablets (650 mg) by mouth every 4 hours if needed for mild pain (1 - 3), headaches or fever (temp greater than 38.0 C). THIS ORDER IS SUPERSEDED BY ANY OTHER MEDICATION ORDER FOR MILD PAIN (1-3). MAY BE USED FOR A HIGHER PAIN SCORE IF PATIENT REQUESTS, Disp: , Rfl:     diazePAM (Valium) 5 mg tablet, TAKE 1-2 TABS PO ONE HOUR PRIOR TO PROCEDURE. MAY REPEAT UPON ARRIVAL TO PROCEDURE, Disp: 4  tablet, Rfl: 0    furosemide (Lasix) 40 mg tablet, TAKE 1 TABLET BY MOUTH ONCE  DAILY, Disp: 90 tablet, Rfl: 3    HYDROmorphone (Dilaudid) 2 mg tablet, Take 1 tablet (2 mg) by mouth 3 times a day as needed for severe pain (7 - 10) for up to 28 days. Do not fill before March 30, 2025., Disp: 84 tablet, Rfl: 0    HYDROmorphone (Dilaudid) 2 mg tablet, Take 1 tablet (2 mg) by mouth 3 times a day as needed for severe pain (7 - 10) for up to 28 days. Do not fill before April 27, 2025., Disp: 84 tablet, Rfl: 0    lisinopril 20 mg tablet, TAKE 1 TABLET BY MOUTH ONCE  DAILY, Disp: 90 tablet, Rfl: 3    metFORMIN (Glucophage) 500 mg tablet, Take 1 tablet (500 mg) by mouth 2 times a day with meals for 14 days., Disp: 28 tablet, Rfl: 0    Tresiba FlexTouch U-100 100 unit/mL (3 mL) injection, Inject 8 Units under the skin once daily in the morning. (Patient not taking: Reported on 5/29/2025), Disp: 9 mL, Rfl: 3  [2]   Patient Active Problem List  Diagnosis    Psoriatic arthritis (Multi)    Overactive bladder    Myalgia    Soft tissue mass    Neurogenic claudication due to lumbar spinal stenosis    Acquired partial lipodystrophy    Acute cystitis without hematuria    Acute renal failure syndrome    ALT (SGPT) level raised    Altered mental status    Anal sphincter incontinence    Anxiety    Arthritis    Arthritis of neck    Autoimmune thyroiditis    Blepharitis    Blurred vision, bilateral    Cataract, nuclear sclerotic, both eyes    Cellulitis    Cervical radicular pain    Pain, radicular, lumbar    Choroidal nevus, left eye    Chronic intractable pain    Chronic low back pain    Clouded consciousness    Colovaginal fistula    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    Consecutive exotropia    Change in bowel habits    Constipation    Contact dermatitis    Controlled insulin dependent type 1 diabetes mellitus (Multi)    Type 2 diabetes mellitus    DDD (degenerative disc disease), thoracic    Dermatochalasis    Difficulty walking     Disorder of refraction    Disturbance, visual, subjective    Diverticulosis of colon    Dry eyes    Early cataract    Elevated creatine kinase    Essential (primary) hypertension    Fecal soiling due to fecal incontinence    Fistula involving female genital tract    Fracture of cervical vertebra    Gastroesophageal reflux disease    Generalized osteoarthritis of multiple sites    Hemorrhoids without complication    Iron deficiency anemia    Leukopenia    Lumbar radiculitis    Mixed hyperlipidemia    Myofascial pain    Numbness    Obesity with body mass index 30 or greater    Palsy of conjugate gaze    Poor venous access    Incisional hernia    Postlaminectomy syndrome, lumbar region    Pseudophakia of both eyes    Psoriasis    Recurrent urinary tract infection    Retinopathy, background, nonproliferative, mild    Skin excoriation    Swelling of upper arm    Vaginal disorder    Weakness generalized    Frequent falls    Lumbar compression fracture, closed, initial encounter (Multi)

## 2025-06-03 ENCOUNTER — APPOINTMENT (OUTPATIENT)
Dept: INFUSION THERAPY | Facility: CLINIC | Age: 70
End: 2025-06-03
Payer: MEDICARE

## 2025-06-09 DIAGNOSIS — E11.9 TYPE 2 DIABETES MELLITUS WITHOUT COMPLICATION, WITH LONG-TERM CURRENT USE OF INSULIN: ICD-10-CM

## 2025-06-09 DIAGNOSIS — Z79.4 TYPE 2 DIABETES MELLITUS WITHOUT COMPLICATION, WITH LONG-TERM CURRENT USE OF INSULIN: ICD-10-CM

## 2025-06-09 RX ORDER — BLOOD SUGAR DIAGNOSTIC
STRIP MISCELLANEOUS
Qty: 100 STRIP | Refills: 3 | Status: SHIPPED | OUTPATIENT
Start: 2025-06-09

## 2025-06-10 ENCOUNTER — TELEPHONE (OUTPATIENT)
Dept: PRIMARY CARE | Facility: CLINIC | Age: 70
End: 2025-06-10
Payer: MEDICARE

## 2025-06-10 NOTE — TELEPHONE ENCOUNTER
Pt states she has finished prednisone Friday. She is c/o LT leg pain from ankle to knee. Per pt she can barely stand on leg.

## 2025-06-12 ENCOUNTER — TELEPHONE (OUTPATIENT)
Dept: RHEUMATOLOGY | Facility: CLINIC | Age: 70
End: 2025-06-12
Payer: MEDICARE

## 2025-06-12 DIAGNOSIS — L40.50 PSORIATIC ARTHRITIS (MULTI): Primary | ICD-10-CM

## 2025-06-16 NOTE — TELEPHONE ENCOUNTER
Will attempt to do home infusion as patient has difficulty leaving her house, she lives alone and is physically unable to.  Requires assistance wheelchair.

## 2025-06-17 ENCOUNTER — SPECIALTY PHARMACY (OUTPATIENT)
Dept: PHARMACY | Facility: CLINIC | Age: 70
End: 2025-06-17

## 2025-06-17 RX ORDER — GOLIMUMAB 50 MG/.5ML
50 INJECTION, SOLUTION SUBCUTANEOUS
Qty: 0.5 ML | Refills: 11 | Status: SHIPPED | OUTPATIENT
Start: 2025-06-17 | End: 2026-06-17

## 2025-06-18 ENCOUNTER — TELEPHONE (OUTPATIENT)
Dept: PRIMARY CARE | Facility: CLINIC | Age: 70
End: 2025-06-18
Payer: MEDICARE

## 2025-06-18 NOTE — TELEPHONE ENCOUNTER
Patient calls office to report her lower left leg is swollen, painful on outside of leg from calf to ankle.  Patient reports this is new pain/discomfort that began on Monday and now is much worse.  Patient states she had to cancel ortho appointment as she had no transport and is unable to walk the hallway of her apartment complex to utilize [facility] services. Please advise.

## 2025-06-20 ENCOUNTER — APPOINTMENT (OUTPATIENT)
Dept: INFUSION THERAPY | Facility: CLINIC | Age: 70
End: 2025-06-20
Payer: MEDICARE

## 2025-06-23 ENCOUNTER — APPOINTMENT (OUTPATIENT)
Dept: PAIN MEDICINE | Facility: CLINIC | Age: 70
End: 2025-06-23
Payer: MEDICARE

## 2025-06-24 ENCOUNTER — TELEPHONE (OUTPATIENT)
Dept: RHEUMATOLOGY | Facility: CLINIC | Age: 70
End: 2025-06-24
Payer: MEDICARE

## 2025-06-24 NOTE — TELEPHONE ENCOUNTER
She has pain in her legs  The pain is causing her to be nauseated.  Has not had sympathy [place] since February and would need to be reloaded.  She is unable to get out of her apartment and has difficulty with agitation.  [facility].    Told her I will contact the [facility] to see what is on her formulary and what we can get her in if she qualifies for any patient assistance.

## 2025-07-01 ENCOUNTER — OFFICE VISIT (OUTPATIENT)
Dept: PAIN MEDICINE | Facility: CLINIC | Age: 70
End: 2025-07-01
Payer: MEDICARE

## 2025-07-01 VITALS
SYSTOLIC BLOOD PRESSURE: 138 MMHG | DIASTOLIC BLOOD PRESSURE: 87 MMHG | HEART RATE: 102 BPM | RESPIRATION RATE: 16 BRPM | OXYGEN SATURATION: 94 %

## 2025-07-01 DIAGNOSIS — M96.1 POSTLAMINECTOMY SYNDROME, LUMBAR REGION: ICD-10-CM

## 2025-07-01 DIAGNOSIS — L40.50 PSORIATIC ARTHRITIS (MULTI): ICD-10-CM

## 2025-07-01 DIAGNOSIS — Z79.899 ENCOUNTER FOR LONG-TERM (CURRENT) USE OF HIGH-RISK MEDICATION: Primary | ICD-10-CM

## 2025-07-01 PROCEDURE — 99214 OFFICE O/P EST MOD 30 MIN: CPT | Performed by: NURSE PRACTITIONER

## 2025-07-01 PROCEDURE — 4010F ACE/ARB THERAPY RXD/TAKEN: CPT | Performed by: NURSE PRACTITIONER

## 2025-07-01 PROCEDURE — 1159F MED LIST DOCD IN RCRD: CPT | Performed by: NURSE PRACTITIONER

## 2025-07-01 PROCEDURE — 3079F DIAST BP 80-89 MM HG: CPT | Performed by: NURSE PRACTITIONER

## 2025-07-01 PROCEDURE — 1125F AMNT PAIN NOTED PAIN PRSNT: CPT | Performed by: NURSE PRACTITIONER

## 2025-07-01 PROCEDURE — 3075F SYST BP GE 130 - 139MM HG: CPT | Performed by: NURSE PRACTITIONER

## 2025-07-01 PROCEDURE — 1160F RVW MEDS BY RX/DR IN RCRD: CPT | Performed by: NURSE PRACTITIONER

## 2025-07-01 PROCEDURE — G2211 COMPLEX E/M VISIT ADD ON: HCPCS | Performed by: NURSE PRACTITIONER

## 2025-07-01 PROCEDURE — 1036F TOBACCO NON-USER: CPT | Performed by: NURSE PRACTITIONER

## 2025-07-01 RX ORDER — HYDROMORPHONE HYDROCHLORIDE 2 MG/1
2 TABLET ORAL 3 TIMES DAILY PRN
Qty: 84 TABLET | Refills: 0 | Status: SHIPPED | OUTPATIENT
Start: 2025-08-26 | End: 2025-09-23

## 2025-07-01 RX ORDER — CYCLOBENZAPRINE HCL 10 MG
10 TABLET ORAL 2 TIMES DAILY PRN
Qty: 60 TABLET | Refills: 2 | Status: SHIPPED | OUTPATIENT
Start: 2025-07-01

## 2025-07-01 RX ORDER — HYDROMORPHONE HYDROCHLORIDE 2 MG/1
2 TABLET ORAL 3 TIMES DAILY PRN
Qty: 84 TABLET | Refills: 0 | Status: SHIPPED | OUTPATIENT
Start: 2025-07-29 | End: 2025-08-26

## 2025-07-01 RX ORDER — GABAPENTIN 300 MG/1
300 CAPSULE ORAL 2 TIMES DAILY
Qty: 60 CAPSULE | Refills: 2 | Status: SHIPPED | OUTPATIENT
Start: 2025-07-01 | End: 2025-09-29

## 2025-07-01 RX ORDER — HYDROMORPHONE HYDROCHLORIDE 2 MG/1
2 TABLET ORAL 3 TIMES DAILY PRN
Qty: 84 TABLET | Refills: 0 | Status: SHIPPED | OUTPATIENT
Start: 2025-07-01 | End: 2025-07-29

## 2025-07-01 ASSESSMENT — ENCOUNTER SYMPTOMS
DEPRESSION: 0
LOSS OF SENSATION IN FEET: 0
OCCASIONAL FEELINGS OF UNSTEADINESS: 1

## 2025-07-01 ASSESSMENT — PAIN DESCRIPTION - DESCRIPTORS: DESCRIPTORS: STABBING

## 2025-07-01 ASSESSMENT — PAIN - FUNCTIONAL ASSESSMENT: PAIN_FUNCTIONAL_ASSESSMENT: 0-10

## 2025-07-01 ASSESSMENT — PAIN SCALES - GENERAL: PAINLEVEL_OUTOF10: 8

## 2025-07-01 NOTE — PROGRESS NOTES
Nesha follows up for interval reevaluation of chronic pain. Endorses chronic low back pain and hip pain. Typically rated 8-10/10.  PMH includes lumbar post laminectomy syndrome,     4/10/25 Caudal epidural steroid injection under fluoroscopy with Dr. Cisneros which provided her 75% relief of bilateral hip pain.  3/13/25 left hip injection with no benefit    Hydromorphone 2 mg TID prn  Cymbalta 30 mg every day  Gabapentin 300 mg BID  Flexeril 10 mg BID prn    OARRS:  No data recorded  I have personally reviewed the OARRS report for Nesha Mcleod. I have considered the risks of abuse, dependence, addiction and diversion    Is the patient prescribed a combination of a benzodiazepine and opioid?  Yes, I feel it is clincially indicated to continue the medication and have discussed with the patient risks/benefits/alternatives.    Last Urine Drug Screen / ordered today: Yes Updated today  No results found for this or any previous visit (from the past 8760 hours).  Results are as expected.     Controlled Substance Agreement:  Date of the Last Agreement: Updated today 7/2025  Reviewed Controlled Substance Agreement including but not limited to the benefits, risks, and alternatives to treatment with a Controlled Substance medication(s).    ROS otherwise negative aside from what was mentioned above in HPI.      Problem List[1]  Medical History[2]  Surgical History[3]  Family History[4]  Social History[5]      ALLERGIES  Allergies[6]      MEDICATIONS  Current Medications[7]        For continuity:   Given the patient's report of reduced pain and improved functional ability without adverse effects, it is reasonable to treat with narcotic medications. The terms of the opioid agreement as well as the potential risks and adverse effects of the patient's medication regimen were discussed in detail. This includes if applicable due to dosage of medication permission to discuss and coordinate care with other treatment providers relevant to  the patients condition. The patient verbalized understanding.      Risks and side effects of chronic opioid therapy including but not limited to tolerance, dependence, constipation, hyperalgesia, cognitive side effects, addiction and possible death due to overuse and or misuse were discussed. I also discussed that such medications when co-administered with other sedative agents including but not limited to alcohol, benzodiazepines, sedative hypnotics and illegal drugs could pose life threatening consequences including death. I also explained the impact that the administration of such medication has on a patient with obstructive sleep apnea and continued recommendations for use of apnea devices if ordered are prescribed by other physicians. In order to effectively and safely treat the pain, I also emphasized the importance of compliance with the treatment plan, as well as compliance with the terms of the opioid agreement, which was reviewed in detail. I explained the importance of being responsible with the medications and to take these only as prescribed, never in excess and never for reasons other than pain reduction. The patient was counseled on keeping the medications safe and locked away from children and other adults as well as disposal methods and options. The patient understood the risks and instructions.      I also discussed with the patient in detail that based on the clinical response to the opioid medications and improvements of activities of daily living, sleep, and work performance in light of compliance with the treatment plan we can continue this form of therapy for the above chronic pain. The goal and rationale used for current treatment with chronic opioid medication is to control the pain and alleviate disability induced by the chronic pain condition noted above after failures of other non-opioid and nonpharmacological modalities to treat the chronic pain and the symptoms associated with have  failed. The patient understood the goals in terms of the above treatment plan and had no further questions prior to leaving the office today.      Of note, the above-mentioned diagnoses/conditions and expected fluctuating nature of pain, and pain characteristic changes may lead to prolonged functional impairment requiring frequent and multiple reassessments with continued high level medical decision making. As noted, medication and medication management may require opiate therapy in excess of a routine less than 30 day medication requirement. The patient may require daily opiate therapy necessitating month-long prescription medication as noted above in order to perform activities of daily living and achieve acceptable quality of life with respect to their chronic pain condition for the foreseeable future. We monitor our patient's carefully through drug monitoring, medication counts, urine drug testing specific to their medication as well as a myriad of other substances and with frequent follow-ups with interval reassement of the chronic pain condition, its pathophysiology and prognosis.      The level of clinical decision making at this office visit is high due to high risks and complications including mortality and morbidity related to acute and chronic pain with respects to life, bodily function, and treatment. Risks and clinical decisions with respect to under treatment, failure to maintain adequate treatment, and/or overtreatment complications and outcomes were discussed with the patient with respect to their chronic pain conditions, interventional therapies, as well as the use of various medications including possible controlled/dangerous medications. The amount and complexity of reviewed data at this in subsequent office visits is high given patient's fluctuating clinical presentation, laboratory and radiographic reports, prescription monitoring program data, and medication history as well as other relevant  data as noted above. Pertinent negatives and positives data was used in consideration for the above-mentioned high complexity.       Given the patient's total MED, general use of daily opiates, or other coadministered medications in various classes the patient was offered a prescription for Narcan. I instructed the patient that it is important that patient fill this medication in order to demonstrate understanding of the gravity of possible side effects including respiratory depression and risk of overdose of this opiate load or medication combination. As such patient will be required to bring Narcan prescription to follow-up appointments as part of compliance with continued opiate care.      With respect to opiate induced constipation I discussed multiple ways to combat this problem including staying hydrated and taking over-the-counter medications such as Dulcolax, Miralax and Senna. If these treatments are not effective we could consider such medications as Amitiza, Linzess and Movantik.      Physical Exam  Vitals and nursing note reviewed.   Constitutional:       Appearance: Normal appearance. No acte distress.  HENT:      Head: Normocephalic and atraumatic.   Eyes:      Conjunctiva/sclera: Conjunctivae normal.   Cardiovascular:      Pulses: RRR  Pulmonary:      Effort: Pulmonary effort is normal. No respiratory distress.   Musculoskeletal:      Right lower leg: No edema.      Left lower leg: No edema.   Skin:     General: Skin is warm and dry.   Neurological:      Mental Status: Alert and oriented to person, place, and time.      Cranial Nerves: No cranial nerve deficit.      Motor: No weakness.      Gait: Walker for ambulation  Psychiatric:         Behavior: Behavior normal.     SPINE LUMBAR WO CONTRAST; 5/23/2023 1:16 pm     INDICATION:  SPINAL STENOSIS OF LUMBAR REGION WITH NEUROGENIC CLAUDICATION. 68-year-old  woman with chronic lower back pain and bilateral leg pain     COMPARISON:  Lumbar spine MRI  04/20/2021.     ACCESSION NUMBER(S):  [id number]     ORDERING CLINICIAN:  ARPIL FLORES     TECHNIQUE:  Multiecho and multiplanar imaging of the lumbar spine was performed without  IV contrast.     FINDINGS:  Redemonstrated levoscoliosis of the lumbar spine. Interbody fusion of the L3  and L4 vertebral bodies again noted with laminectomy changes at L2, L3, and  L5 again seen.     Moderate to severe disc space narrowing is again noted at L1-L2, and L2-L3.        Alignment: There is grade 1 anterolisthesis of L5 on S1. There is grade 1  retrolisthesis of L2 on L3.        Bone marrow signal: Redemonstrated heterogeneous bone marrow signal, which  could be related to marrow reconversion process. Redemonstrated slightly  increased STIR signal within the L2 vertebral body, which may reflect  reactive bone marrow edema in the setting of degenerative change.        Conus: The conus medullaris terminates at the level of L1.        Paraspinal Soft tissues: Postsurgical changes in the posterior lumbar soft  tissues again noted.        Imaged Abdomen: Millimetric T2 hyperintense, T1 hypointense right and left  renal cysts are seen.        T12-L1: Moderate left paracentral disc bulge again noted causing effacement  of the left side of the ventral thecal sac. No significant spinal canal or  neural foraminal narrowing.        L1-2: Mild disc bulge with facet arthropathy is again noted, resulting in  mild spinal canal narrowing. There is also mild bilateral neural foraminal  narrowing, more pronounced on the right.        L2-3: Laminectomy changes again noted. There is broad-based disc osteophyte  with facet hypertrophy resulting in severe spinal canal stenosis. Severe  bilateral neural foraminal narrowing is identified with likely contact and  impingement upon the exiting bilateral nerve roots.        L3-4: Redemonstrated interbody fusion/ankylosis of the L3-L4 vertebral  bodies with laminectomy changes noted. Mild facet joint  arthropathy. No  significant spinal canal narrowing. Mild right neural foraminal narrowing.        L4-5: Spondylolisthesis with broad-based disc protrusion, facet hypertrophy,  and mild ligamentum flavum hypertrophy results in mild spinal canal  narrowing. There is also moderate right and mild-to-moderate left neural  foraminal narrowing.        L5-S1: Laminectomy changes again noted. No sign of spinal canal narrowing.  There is moderate facet joint hypertrophy. There is mild-to-moderate left  and mild right neural foraminal narrowing.     IMPRESSION:  1. Redemonstrated postsurgical and degenerative changes of the lumbar spine,  with multilevel neural foraminal narrowing and spinal canal narrowing.     2. At L2-L3, there is severe bilateral neural foraminal narrowing and severe  spinal canal stenosis.     3. Please see above for full description of findings at each individual disc  level.     4. Redemonstrated heterogeneous bone marrow signal, as can be seen with  marrow reconversion process. Please correlate with appropriate clinical and  laboratory data.        Dictation workstation:   [id number]     Original Interpreting Physician:   SHAQ SOLO MD  Original Transcribed by/Date: MMODAL May 23 2023 12:15P  Original Electronically Signed by/Date: SHAQ SOLO MD May 23 2023  2:52P     Encounter Diagnoses   Name Primary?    Postlaminectomy syndrome, lumbar region     Psoriatic arthritis (Multi)     Encounter for long-term (current) use of high-risk medication Yes     Plan  Continue plan of care for chronic pain  Hydromorphone, gabapentin, Cymbalta flexeril as currently prescribed  UDS and CSA updated today  Counseled pt on black box warning and risks for concurrent use of opiates and benzodiazepines.  May repeat caudal as needed  RTC 3 mos or as needed    Michelle Noyola, APRN-CNP                            [1]   Patient Active Problem List  Diagnosis    Psoriatic arthritis (Multi)    Overactive bladder     Myalgia    Soft tissue mass    Neurogenic claudication due to lumbar spinal stenosis    Acquired partial lipodystrophy    Acute cystitis without hematuria    Acute renal failure syndrome    ALT (SGPT) level raised    Altered mental status    Anal sphincter incontinence    Anxiety    Arthritis    Arthritis of neck    Autoimmune thyroiditis    Blepharitis    Blurred vision, bilateral    Cataract, nuclear sclerotic, both eyes    Cellulitis    Cervical radicular pain    Pain, radicular, lumbar    Choroidal nevus, left eye    Chronic intractable pain    Chronic low back pain    Clouded consciousness    Colovaginal fistula    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    Consecutive exotropia    Change in bowel habits    Constipation    Contact dermatitis    Controlled insulin dependent type 1 diabetes mellitus (Multi)    Type 2 diabetes mellitus    DDD (degenerative disc disease), thoracic    Dermatochalasis    Difficulty walking    Disorder of refraction    Disturbance, visual, subjective    Diverticulosis of colon    Dry eyes    Early cataract    Elevated creatine kinase    Essential (primary) hypertension    Fecal soiling due to fecal incontinence    Fistula involving female genital tract    Fracture of cervical vertebra    Gastroesophageal reflux disease    Generalized osteoarthritis of multiple sites    Hemorrhoids without complication    Iron deficiency anemia    Leukopenia    Lumbar radiculitis    Mixed hyperlipidemia    Myofascial pain    Numbness    Obesity with body mass index 30 or greater    Palsy of conjugate gaze    Poor venous access    Incisional hernia    Postlaminectomy syndrome, lumbar region    Pseudophakia of both eyes    Psoriasis    Recurrent urinary tract infection    Retinopathy, background, nonproliferative, mild    Skin excoriation    Swelling of upper arm    Vaginal disorder    Weakness generalized    Frequent falls    Lumbar compression fracture, closed, initial encounter (Multi)   [2]   Past  Medical History:  Diagnosis Date    Anxiety disorder, unspecified     Anxiety    Arthritis     Arthropathic psoriasis, unspecified (Multi)     Psoriatic arthropathy    Chronic back pain     DM type 2 (diabetes mellitus, type 2) (Multi)     GERD (gastroesophageal reflux disease)     Hypertension     Injury of head 05/09/2024    Other chronic pain     Chronic pain    Other conditions influencing health status     Osteoarthritis    Personal history of diseases of the skin and subcutaneous tissue     History of psoriasis    Personal history of other diseases of the musculoskeletal system and connective tissue     History of fibromyositis    Personal history of other endocrine, nutritional and metabolic disease     History of hypothyroidism   [3]   Past Surgical History:  Procedure Laterality Date    APPENDECTOMY      Appendectomy    BACK SURGERY      x7    BREAST SURGERY Bilateral     Breast Surgery Reduction Procedure    CARPAL TUNNEL RELEASE      Neuroplasty Decompression Median Nerve At Carpal Tunnel    CARPAL TUNNEL RELEASE      Neuroplasty Decompression Median Nerve At Carpal Tunnel    CERVICAL FUSION      CHOLECYSTECTOMY      Cholecystectomy    ESOPHAGOGASTRODUODENOSCOPY      EYE SURGERY      Eye Surgery    FOOT SURGERY      Foot Surgery    GALLBLADDER SURGERY      Gallbladder Surgery    GASTRIC BYPASS      HERNIA REPAIR      Hernia Repair    HYSTERECTOMY      Hysterectomy    INCISIONAL HERNIA REPAIR  09/24/2020    with mesh    KNEE ARTHROSCOPY W/ DEBRIDEMENT      Arthroscopy Knee Left    KNEE ARTHROSCOPY W/ DEBRIDEMENT      Arthroscopy Knee Right    KNEE SURGERY      Knee Surgery    KNEE SURGERY      Knee Surgery    LIPECTOMY  01/2014    PANNICULECTOMY    LIPECTOMY      NECK SURGERY      Neck Surgery    OTHER SURGICAL HISTORY      Dental Surgery    OTHER SURGICAL HISTORY      Nerve Block Transforaminal Epidural    OTHER SURGICAL HISTORY      Blepharoplasty    OTHER SURGICAL HISTORY  10/13/2016    Rectovaginal  Fistula Repair - trasabdomnial - Dr. Pelaez    SHOULDER SURGERY      Shoulder Surgery    SHOULDER SURGERY      Shoulder Surgery    TONSILLECTOMY      Tonsillectomy    TOTAL KNEE ARTHROPLASTY Left     Knee Replacement    TOTAL KNEE ARTHROPLASTY      Knee Replacement   [4]   Family History  Problem Relation Name Age of Onset    Hypertension Mother      Asthma Mother      Arthritis Father      Heart disease Father      Arthritis Brother      Hypertension Brother      Hypertension Brother      Lymphoma Maternal Grandmother      Heart disease Paternal Grandfather     [5]   Social History  Tobacco Use    Smoking status: Never     Passive exposure: Never    Smokeless tobacco: Never   Substance Use Topics    Alcohol use: Defer    Drug use: Defer   [6]   Allergies  Allergen Reactions    Methotrexate Swelling    Trazodone Other     Shaking     Adhesive Tape-Silicones Hives    Bleach (Sodium Hypochlorite) Hives, Unknown and Itching    Chlorine Unknown and Other    Ciprofloxacin Other and Unknown     Yeast infection    Citalopram Other     Shaking    Codeine Hives and Rash    Doxycycline Nausea And Vomiting and Unknown    Latex Hives and Unknown    Morphine Hives and Rash    Penicillins Hives    Soap Unknown and Hives    Tetracycline Hives    Amitriptyline Other     nightmares    Grass Pollen Itching    Infliximab Unknown    Other Hives and Unknown     Cleaning products, Detergents, Suffemeth    Rubber, Unspecified Unknown     Natural rubber    Trace Metals Unknown    Weed Pollen Itching    Methocarbamol Rash and Unknown    Nickel Rash    Sulfa (Sulfonamide Antibiotics) Nausea/vomiting and GI Upset   [7]   Current Outpatient Medications   Medication Sig Dispense Refill    acetaminophen (Tylenol) 325 mg tablet Take 2 tablets (650 mg) by mouth every 4 hours if needed for mild pain (1 - 3), headaches or fever (temp greater than 38.0 C). THIS ORDER IS SUPERSEDED BY ANY OTHER MEDICATION ORDER FOR MILD PAIN (1-3). MAY BE USED FOR  A HIGHER PAIN SCORE IF PATIENT REQUESTS      aspirin 81 mg chewable tablet Chew 1 tablet (81 mg) once daily. HOLD IF GIVEN IN LAST 24 HOURS OR IF HISTORY OF HYPERSENSITIVITY.      atorvastatin (Lipitor) 40 mg tablet TAKE 1 TABLET BY MOUTH ONCE  DAILY 90 tablet 3    buPROPion SR (Wellbutrin SR) 100 mg 12 hr tablet TAKE 1 TABLET BY MOUTH ONCE  DAILY DO NOT CRUSH, CHEW, OR  SPLIT 90 tablet 3    cinnamon bark extract 500 mg tablet Cinnamon 500 MG as directed Orally daily Active      cyclobenzaprine (Flexeril) 10 mg tablet Take 1 tablet (10 mg) by mouth 2 times a day as needed for muscle spasms. 60 tablet 2    diazePAM (Valium) 5 mg tablet TAKE 1-2 TABS PO ONE HOUR PRIOR TO PROCEDURE. MAY REPEAT UPON ARRIVAL TO PROCEDURE 4 tablet 0    DULoxetine (Cymbalta) 30 mg DR capsule Take 1 capsule (30 mg) by mouth once daily. Do not crush or chew. 30 capsule 11    furosemide (Lasix) 40 mg tablet TAKE 1 TABLET BY MOUTH ONCE  DAILY 90 tablet 3    gabapentin (Neurontin) 300 mg capsule Take 1 capsule (300 mg) by mouth 2 times a day. Do not fill before March 2, 2025. 60 capsule 2    golimumab (Simponi Aria) 12.5 mg/mL solution injection Infuse 12 mL (150 mg total) into a venous catheter every 8 (eight) weeks.  at week 0, 4, and then every 8 weeks thereafter 12 mL 5    golimumab (Simponi) 50 mg/0.5 mL pen injector Inject 0.5 mL (50 mg) under the skin every 28 (twenty-eight) days. 0.5 mL 11    HYDROmorphone (Dilaudid) 2 mg tablet Take 1 tablet (2 mg) by mouth 3 times a day as needed for severe pain (7 - 10) for up to 28 days. Do not fill before March 30, 2025. 84 tablet 0    HYDROmorphone (Dilaudid) 2 mg tablet Take 1 tablet (2 mg) by mouth 3 times a day as needed for severe pain (7 - 10) for up to 28 days. Do not fill before April 27, 2025. 84 tablet 0    HYDROmorphone (Dilaudid) 2 mg tablet Take 1 tablet (2 mg) by mouth 3 times a day as needed for severe pain (7 - 10) for up to 28 days. Do not fill before May 26, 2025. 84 tablet 0     leflunomide (Arava) 10 mg tablet TAKE 1 TABLET BY MOUTH ONCE  DAILY 90 tablet 3    levothyroxine (Synthroid, Levoxyl) 75 mcg tablet TAKE 1 TABLET BY MOUTH ONCE  DAILY IN THE MORNING BEFORE A  MEAL 90 tablet 3    lisinopril 20 mg tablet TAKE 1 TABLET BY MOUTH ONCE  DAILY 90 tablet 3    magnesium oxide (Mag-Ox) 250 mg magnesium tablet Take 1 tablet (250 mg) by mouth once daily.      metFORMIN (Glucophage) 500 mg tablet Take 1 tablet (500 mg) by mouth 2 times a day with meals for 14 days. 28 tablet 0    metFORMIN (Glucophage) 500 mg tablet TAKE 1 TABLET BY MOUTH TWICE  DAILY WITH MEALS 180 tablet 3    multivit-minerals/folic acid (CENTRUM ADULTS ORAL) Take 1 tablet by mouth once daily.      naloxone (Narcan) 4 mg/0.1 mL nasal spray Administer 1 spray (4 mg) into affected nostril(s) if needed for opioid reversal or respiratory depression. May repeat every 2-3 minutes if needed, alternating nostrils, until medical assistance becomes available. 2 each 0    OneTouch Ultra Test USE 1 STRIP ONCE DAILY 100 strip 3    potassium gluconate 595 mg (99 mg) tablet Take 1 tablet by mouth once daily.      sodium chloride 0.9% parenteral solution with golimumab 12.5 mg/mL solution 2 mg/kg Infuse 2 mg/kg into a venous catheter every 8 (eight) weeks.      Tresiba FlexTouch U-100 100 unit/mL (3 mL) injection Inject 8 Units under the skin once daily in the morning. (Patient not taking: Reported on 5/29/2025) 9 mL 3     No current facility-administered medications for this visit.

## 2025-07-03 ENCOUNTER — TELEPHONE (OUTPATIENT)
Dept: RHEUMATOLOGY | Facility: CLINIC | Age: 70
End: 2025-07-03
Payer: MEDICARE

## 2025-07-03 DIAGNOSIS — L40.50 PSORIATIC ARTHRITIS (MULTI): Primary | ICD-10-CM

## 2025-07-03 RX ORDER — ADALIMUMAB 40MG/0.4ML
40 KIT SUBCUTANEOUS
Qty: 2 EACH | Refills: 11 | Status: SHIPPED | OUTPATIENT
Start: 2025-07-03

## 2025-07-03 NOTE — TELEPHONE ENCOUNTER
Do not think we will be able to get injectable Simponi but we can see if we can get Humira or a biosimilar.  It is injection as well every 2 weeks.

## 2025-07-07 ENCOUNTER — SPECIALTY PHARMACY (OUTPATIENT)
Dept: PHARMACY | Facility: CLINIC | Age: 70
End: 2025-07-07

## 2025-07-07 ENCOUNTER — PHARMACY VISIT (OUTPATIENT)
Dept: PHARMACY | Facility: CLINIC | Age: 70
End: 2025-07-07
Payer: COMMERCIAL

## 2025-07-07 PROCEDURE — RXMED WILLOW AMBULATORY MEDICATION CHARGE

## 2025-07-09 ENCOUNTER — TELEMEDICINE CLINICAL SUPPORT (OUTPATIENT)
Dept: PHARMACY | Facility: HOSPITAL | Age: 70
End: 2025-07-09
Payer: MEDICARE

## 2025-07-09 DIAGNOSIS — L40.50 PSORIATIC ARTHRITIS (MULTI): ICD-10-CM

## 2025-07-09 RX ORDER — ADALIMUMAB 40MG/0.4ML
40 KIT SUBCUTANEOUS
Qty: 2 EACH | Refills: 10 | Status: SHIPPED | OUTPATIENT
Start: 2025-07-09

## 2025-07-09 NOTE — PROGRESS NOTES
[facility] Clinical Note  Initial Patient Education     Introduction  Nesha Mcleod is a 70 y.o. female who is on the specialty pharmacy service for management of: Rheumatology Core.    Nesha Mcleod is initiating the following therapy:     Humira 40mg subcutaneous every 14 days for PSA        Medication receipt date: Today 7/9/25     Duration of therapy: Maintenance - pending efficacy and tolerance. There was confusion initially as pt thought humira being used as a bridge to get her back to simponi aria infusions at infusion center with infusion carolin for 8/14/25. But that has since been cleared up with the office and patient that the plan would be for long term humira pending efficacy and tolerance due to the difficulties getting to infusion center/ staying on track with infusions.     The most recent encounter visit with the referring prescriber Dr. Valdes on 5/7/25 was reviewed.  Pharmacy will continue to collaborate in the care of this patient with the referring prescriber.    Clinical Background  An initial assessment was conducted prior to first fill of the medication to determine the appropriateness of therapy given the patient's diagnosis, medication list, comorbidities, allergies, medical history, patient's ability to self administer medication, and therapeutic goals based on possible outcomes of therapy. Refer to initial assessment task completed on 7/7/25.    Labs for clinical appropriateness that were reviewed include:   Rheumatology- CBC-diff:   Lab Results   Component Value Date    WBC 6.1 05/09/2025    RBC 3.63 (L) 05/09/2025    HGB 11.3 (L) 05/09/2025    HCT 35.1 05/09/2025    MCV 96.7 05/09/2025    MCHC 32.2 05/09/2025     05/09/2025    RDW 13.5 05/09/2025    NEUTOPHILPCT 53.3 08/27/2024    IGPCT 0.3 08/27/2024    LYMPHOPCT 26.8 08/27/2024    MONOPCT 11.9 08/27/2024    EOSPCT 6.7 08/27/2024    BASOPCT 1.0 08/27/2024    NEUTROABS 3.37 08/27/2024    LYMPHSABS 1.69  08/27/2024    MONOSABS 0.75 08/27/2024    EOSABS 0.42 08/27/2024    BASOSABS 0.06 08/27/2024   , CMP:   Lab Results   Component Value Date    GLUCOSE 126 (H) 05/09/2025     (L) 05/09/2025    K 3.6 05/09/2025    CL 92 (L) 05/09/2025    CO2 31 05/09/2025    ANIONGAP 10 05/09/2025    BUN 37 (H) 05/09/2025    CREATININE 1.18 (H) 05/09/2025    GFRF 44 (A) 07/20/2022    CALCIUM 9.0 05/09/2025    ALBUMIN 4.2 08/27/2024    ALKPHOS 69 08/27/2024    PROT 6.6 08/27/2024    AST 22 08/27/2024    BILITOT 0.4 08/27/2024    ALT 18 08/27/2024   , TB:   Lab Results   Component Value Date    TBSIN Negative 10/22/2024    QFG NEGATIVE 07/30/2018   , Hepatitis B panel:   Lab Results   Component Value Date    HEPBSAG Nonreactive 10/22/2024   , LFTs and bilirubin:   Lab Results   Component Value Date    ALT 18 08/27/2024    AST 22 08/27/2024    ALKPHOS 69 08/27/2024    BILITOT 0.4 08/27/2024   , CRP:   Lab Results   Component Value Date    CRP 0.4 03/06/2023   , CARROLL:   Lab Results   Component Value Date    CARROLL  05/14/2021     Positive  Reference range: NEGATIVE    Normal range : negative at <1:80 serum dilution.  Test performed using Indirect Fluorescence Immunoassay   technology (IFA) using HEp-2 cells.     , CARROLL Titer/JYOTI Panel:   Lab Results   Component Value Date    ANATITER 1:80  Reference range: NEGATIVE   05/14/2021    ANAPATTRN  05/14/2021     Homogeneous    Nucleolar  Performed at the [facility] unless   otherwise noted.     , ESR:    Lab Results   Component Value Date    SEDRATE 20 03/06/2023       Education/Discussion  Nesha was contacted on 7/9/2025 at 1:34 PM for a pharmacy visit with encounter number [id number] from:   [facility]  [address]  [facility] [address]  [facility] [address]  Dept: [phone]  Dept Fax: [phone]  Loc: [phone]  Nesha consented to a/an Telephone visit, which was performed.    Medication Start Date (planned or  actual): Today Wednesday 7/9/25         Education was conducted prior to start of therapy? Yes    Education discussed includes the following:  Patient Education  Counseled the Patient on the Following : Theraputic rationale and expected outcomes, Possible side effects and management, Expected duration of therapy, Lab monitoring and follow-up, Doses and administration, Adherence and missed doses, Safe handling, storage, and disposal  Learner: Patient  Education Method: Explanation  Education Response: Verbalizes understanding  Additional details of the medication specific counseling are found within the linked patient education flowsheet.     The follow up timeline was discussed. Every person responds to and reacts to therapy differently. Patient should be assessed for efficacy and tolerability in approximately: 8-12 weeks    Provided education on goals and possible outcomes of therapy:  Adherence with therapy  Timely completion of appropriate labs  Timely and appropriate follow up with provider  Identify and address medication interactions with presciption medications, OTC medications and supplements  Optimize or maintain quality of life  Rheumatology: Remission or low disease activity  Reduction of inflammation, joint pain, swelling, and morning stiffness           The importance of adherence was discussed and they were advised to take the medication as prescribed by their provider.         Impression/Plan  Review and Assessment   Medications Assessed for Appropriate Use, Dose, Route, Frequency, and Duration: Yes  Medication Reconciliation Completed: Yes  Drug Interactions Evaluated: Yes  Clinically Relevant Drug Interactions Identified: No    This patient has not been identified as high risk due to Lack of high risk qualifiers.  The following action was taken: N/A.         The  Specialty Pharmacy Welcome packet may be viewed here:   Specialty Pharmacy Welcome Packet     Or by scanning QR code:      Provided  contact information ([phone]) for [facility] and reviewed dispensing process, refill timeline and patient management follow up. Advised to contact the pharmacy if there are any adverse effects and/or changes to medication list, including prescriptions, OTC medications, herbal products, or supplements. Confirmed understanding of education conducted during assessment. All questions and concerns were addressed and patient was encouraged to reach out for additional questions or concerns.      Daniella Ferguson, NicolaD

## 2025-07-17 ENCOUNTER — TELEPHONE (OUTPATIENT)
Dept: PRIMARY CARE | Facility: CLINIC | Age: 70
End: 2025-07-17
Payer: MEDICARE

## 2025-07-18 ENCOUNTER — OFFICE VISIT (OUTPATIENT)
Dept: PRIMARY CARE | Facility: CLINIC | Age: 70
End: 2025-07-18
Payer: MEDICARE

## 2025-07-18 VITALS
HEART RATE: 86 BPM | SYSTOLIC BLOOD PRESSURE: 118 MMHG | RESPIRATION RATE: 16 BRPM | OXYGEN SATURATION: 96 % | TEMPERATURE: 98.4 F | DIASTOLIC BLOOD PRESSURE: 62 MMHG

## 2025-07-18 DIAGNOSIS — E78.2 MIXED HYPERLIPIDEMIA: ICD-10-CM

## 2025-07-18 DIAGNOSIS — E11.9 TYPE 2 DIABETES MELLITUS WITHOUT COMPLICATION, WITH LONG-TERM CURRENT USE OF INSULIN: ICD-10-CM

## 2025-07-18 DIAGNOSIS — L40.50 PSORIATIC ARTHRITIS (MULTI): ICD-10-CM

## 2025-07-18 DIAGNOSIS — Z79.4 TYPE 2 DIABETES MELLITUS WITHOUT COMPLICATION, WITH LONG-TERM CURRENT USE OF INSULIN: ICD-10-CM

## 2025-07-18 DIAGNOSIS — I10 ESSENTIAL (PRIMARY) HYPERTENSION: Primary | ICD-10-CM

## 2025-07-18 DIAGNOSIS — E06.3 AUTOIMMUNE THYROIDITIS: ICD-10-CM

## 2025-07-18 PROCEDURE — 3078F DIAST BP <80 MM HG: CPT | Performed by: NURSE PRACTITIONER

## 2025-07-18 PROCEDURE — 3074F SYST BP LT 130 MM HG: CPT | Performed by: NURSE PRACTITIONER

## 2025-07-18 PROCEDURE — 99349 HOME/RES VST EST MOD MDM 40: CPT | Performed by: NURSE PRACTITIONER

## 2025-07-18 PROCEDURE — 4010F ACE/ARB THERAPY RXD/TAKEN: CPT | Performed by: NURSE PRACTITIONER

## 2025-07-18 PROCEDURE — 1125F AMNT PAIN NOTED PAIN PRSNT: CPT | Performed by: NURSE PRACTITIONER

## 2025-07-18 ASSESSMENT — ENCOUNTER SYMPTOMS
FATIGUE: 1
COUGH: 0
GASTROINTESTINAL NEGATIVE: 1
HEMATOLOGIC/LYMPHATIC NEGATIVE: 1
BACK PAIN: 1
ARTHRALGIAS: 1
ACTIVITY CHANGE: 1
HEADACHES: 1
APPETITE CHANGE: 1
DIARRHEA: 0
SINUS PRESSURE: 1
PSYCHIATRIC NEGATIVE: 1
CONSTIPATION: 0
RESPIRATORY NEGATIVE: 1
JOINT SWELLING: 1
WEAKNESS: 1
SHORTNESS OF BREATH: 0
ABDOMINAL PAIN: 0
FREQUENCY: 1
EYES NEGATIVE: 1
MYALGIAS: 1

## 2025-07-18 ASSESSMENT — PAIN SCALES - GENERAL: PAINLEVEL_OUTOF10: 10-WORST PAIN EVER

## 2025-07-18 NOTE — PROGRESS NOTES
Subjective   Patient ID: Nesha Mcleod is a 70 y.o. female who is being seen for routine one month house calls follow up.    HPI Pt seen in single family apartment. PMHx: Chronic pain, DDD, HTN, HLD, DM, OA, Hypothyroid, Psoriatic arthritis, GERD. Pt seen lying back in recliner with legs elevated. Pt alert and oriented times three and cooperative with examination. Pt saw Dr. Vanegas for left leg pain and was told no fractures and was not scheduled a follow up. Pt continues with pain in left leg. Pt told to walk more and has been ambulating more around apartment with Rolator. Pt denies recent falls. Pt states not doing good with ambulation. Pt started Humira for her psoriatic arthritis and started this week, will be taking every two weeks. Pt has weaned herself off Wellbutrin due to making her want to eat all day and those symptoms have diminished since stopping the Wellbutrin. Pts blood sugars running 130-140's and staying under 200. Has been taking Tresiba but doesn't take it everyday just when sugar is high. Pt reports energy as low and not having any. Spends most of day in lift chair. Pt appetite reported as not the greatest, pt active with meals on wheels. Pt sleep at night reported as fair, wakes up a couple times a night due to leg pain. Pt denies fever, chills, night sweats or dizziness. Pt admits to occasional headaches. Pt denies SOB, cough, CP or palpitations. Pt denies N/V/D or constipation with a bowel movement daily. Pt admits to urinary frequency and urgency. Pt with all over joint pain from Psoriatic Arthritis and left leg pain. Pt continues to live alone in senior apartment complex. Pt independent with ADL's. Pt has friends that have been doing her shopping and helping with her needs since she can not do things herself due to left leg pain. Pt does own medications. Pt considering PT that was offered but at present would like to continue to increase ambulation on her own, pt told to contact provider if  she changes her mind and will order physical therapy. Pt with no other concerns or complaints at present.   Home Visit medically necessary due to: pt has chronic condition that makes access to a traditional office visit very difficult, illness or condition that results in activity limitation or restriction that impacts the ability to leave home such as; unsteady gait/ poor condition.      Review of Systems   Constitutional:  Positive for activity change, appetite change and fatigue.   HENT:  Positive for congestion and sinus pressure.    Eyes: Negative.    Respiratory: Negative.  Negative for cough and shortness of breath.    Cardiovascular:  Positive for leg swelling. Negative for chest pain.   Gastrointestinal: Negative.  Negative for abdominal pain, constipation and diarrhea.   Endocrine: Positive for cold intolerance.   Genitourinary:  Positive for frequency and urgency.   Musculoskeletal:  Positive for arthralgias, back pain, gait problem, joint swelling and myalgias (left leg).   Skin: Negative.    Allergic/Immunologic: Positive for environmental allergies.   Neurological:  Positive for weakness and headaches.   Hematological: Negative.    Psychiatric/Behavioral: Negative.         Objective   /62 (BP Location: Left arm, Patient Position: Sitting, BP Cuff Size: Adult)   Pulse 86   Temp 36.9 °C (98.4 °F) (Temporal)   Resp 16   SpO2 96%     Current Medications[1]      Physical Exam  Constitutional:       Appearance: Normal appearance. She is normal weight.   HENT:      Head: Normocephalic and atraumatic.      Nose: Nose normal.      Mouth/Throat:      Mouth: Mucous membranes are moist.      Pharynx: Oropharynx is clear.     Eyes:      Extraocular Movements: Extraocular movements intact.      Conjunctiva/sclera: Conjunctivae normal.      Pupils: Pupils are equal, round, and reactive to light.       Cardiovascular:      Rate and Rhythm: Normal rate and regular rhythm.      Pulses: Normal pulses.       Heart sounds: Normal heart sounds.   Pulmonary:      Effort: Pulmonary effort is normal.      Breath sounds: Examination of the right-lower field reveals decreased breath sounds. Examination of the left-lower field reveals decreased breath sounds. Decreased breath sounds present.   Abdominal:      General: Abdomen is flat. Bowel sounds are normal.      Palpations: Abdomen is soft.     Musculoskeletal:         General: Swelling, tenderness (left leg) and signs of injury present.      Cervical back: Normal range of motion and neck supple.     Skin:     General: Skin is warm and dry.      Capillary Refill: Capillary refill takes less than 2 seconds.     Neurological:      Mental Status: She is alert and oriented to person, place, and time.      Motor: Weakness present.      Gait: Gait abnormal.     Psychiatric:         Mood and Affect: Mood normal.         Behavior: Behavior normal.         Thought Content: Thought content normal.         Judgment: Judgment normal.     Problem List[2]      Assessment/Plan   Diagnoses and all orders for this visit:  Essential (primary) hypertension  Comments:  Controlled on Lisinopril.  Continue LIsinopril 20 mg po daily.  Mixed hyperlipidemia  Comments:  Controlled on Lipotor   Continue Lipitor 40 mg po daily.  Autoimmune thyroiditis  Comments:  Controlled on Levothryoxine.  Continue Levothyroxine 75 mcg po daily.  Type 2 diabetes mellitus without complication, with long-term current use of insulin  Comments:  Controlled on Metformin, Tresiba   Continue Metfornin 500 mg po twice daily.   Continue Tresiba 8 units sq daily.   Continue to monitor blood sugar daily.  Try to follow diabetic diet.  Psoriatic arthritis (Multi)  Comments:  Continue Humira 50 mg injection every 14 days.   Continue Diluadid 2mg po three times a day for pain as needed.   Continue to follow with rheumatology and pain management.       Follow up in 2 months or prn.  Angela Dyer, APRN-CNP         [1]   Current  Outpatient Medications:     acetaminophen (Tylenol) 325 mg tablet, Take 2 tablets (650 mg) by mouth every 4 hours if needed for mild pain (1 - 3), headaches or fever (temp greater than 38.0 C). THIS ORDER IS SUPERSEDED BY ANY OTHER MEDICATION ORDER FOR MILD PAIN (1-3). MAY BE USED FOR A HIGHER PAIN SCORE IF PATIENT REQUESTS, Disp: , Rfl:     adalimumab (Humira,CF, Pen) 40 mg/0.4 mL pen injector kit pen-injector, Inject 1 Pen (40 mg) under the skin every 14 (fourteen) days., Disp: 2 each, Rfl: 10    aspirin 81 mg chewable tablet, Chew 1 tablet (81 mg) once daily. HOLD IF GIVEN IN LAST 24 HOURS OR IF HISTORY OF HYPERSENSITIVITY., Disp: , Rfl:     atorvastatin (Lipitor) 40 mg tablet, TAKE 1 TABLET BY MOUTH ONCE  DAILY, Disp: 90 tablet, Rfl: 3    cinnamon bark extract 500 mg tablet, Cinnamon 500 MG as directed Orally daily Active, Disp: , Rfl:     cyclobenzaprine (Flexeril) 10 mg tablet, Take 1 tablet (10 mg) by mouth 2 times a day as needed for muscle spasms., Disp: 60 tablet, Rfl: 2    DULoxetine (Cymbalta) 30 mg DR capsule, Take 1 capsule (30 mg) by mouth once daily. Do not crush or chew., Disp: 30 capsule, Rfl: 11    furosemide (Lasix) 40 mg tablet, TAKE 1 TABLET BY MOUTH ONCE  DAILY, Disp: 90 tablet, Rfl: 3    gabapentin (Neurontin) 300 mg capsule, Take 1 capsule (300 mg) by mouth 2 times a day., Disp: 60 capsule, Rfl: 2    HYDROmorphone (Dilaudid) 2 mg tablet, Take 1 tablet (2 mg) by mouth 3 times a day as needed for severe pain (7 - 10) for up to 28 days., Disp: 84 tablet, Rfl: 0    leflunomide (Arava) 10 mg tablet, TAKE 1 TABLET BY MOUTH ONCE  DAILY, Disp: 90 tablet, Rfl: 3    levothyroxine (Synthroid, Levoxyl) 75 mcg tablet, TAKE 1 TABLET BY MOUTH ONCE  DAILY IN THE MORNING BEFORE A  MEAL, Disp: 90 tablet, Rfl: 3    lisinopril 20 mg tablet, TAKE 1 TABLET BY MOUTH ONCE  DAILY, Disp: 90 tablet, Rfl: 3    magnesium oxide (Mag-Ox) 250 mg magnesium tablet, Take 1 tablet (250 mg) by mouth once daily., Disp: , Rfl:      metFORMIN (Glucophage) 500 mg tablet, TAKE 1 TABLET BY MOUTH TWICE  DAILY WITH MEALS, Disp: 180 tablet, Rfl: 3    multivit-minerals/folic acid (CENTRUM ADULTS ORAL), Take 1 tablet by mouth once daily., Disp: , Rfl:     naloxone (Narcan) 4 mg/0.1 mL nasal spray, Administer 1 spray (4 mg) into affected nostril(s) if needed for opioid reversal or respiratory depression. May repeat every 2-3 minutes if needed, alternating nostrils, until medical assistance becomes available., Disp: 2 each, Rfl: 0    OneTouch Ultra Test, USE 1 STRIP ONCE DAILY, Disp: 100 strip, Rfl: 3    potassium gluconate 595 mg (99 mg) tablet, Take 1 tablet by mouth once daily., Disp: , Rfl:     Tresiba FlexTouch U-100 100 unit/mL (3 mL) injection, Inject 8 Units under the skin once daily in the morning., Disp: 9 mL, Rfl: 3    [START ON 7/29/2025] HYDROmorphone (Dilaudid) 2 mg tablet, Take 1 tablet (2 mg) by mouth 3 times a day as needed for severe pain (7 - 10) for up to 28 days. Do not fill before July 29, 2025., Disp: 84 tablet, Rfl: 0    [START ON 8/26/2025] HYDROmorphone (Dilaudid) 2 mg tablet, Take 1 tablet (2 mg) by mouth 3 times a day as needed for severe pain (7 - 10) for up to 28 days. Do not fill before August 26, 2025., Disp: 84 tablet, Rfl: 0    metFORMIN (Glucophage) 500 mg tablet, Take 1 tablet (500 mg) by mouth 2 times a day with meals for 14 days., Disp: 28 tablet, Rfl: 0  [2]   Patient Active Problem List  Diagnosis    Psoriatic arthritis (Multi)    Overactive bladder    Myalgia    Soft tissue mass    Neurogenic claudication due to lumbar spinal stenosis    Acquired partial lipodystrophy    Acute cystitis without hematuria    Acute renal failure syndrome    ALT (SGPT) level raised    Altered mental status    Anal sphincter incontinence    Anxiety    Arthritis    Arthritis of neck    Autoimmune thyroiditis    Blepharitis    Blurred vision, bilateral    Cataract, nuclear sclerotic, both eyes    Cellulitis    Cervical radicular pain     Pain, radicular, lumbar    Choroidal nevus, left eye    Chronic intractable pain    Chronic low back pain    Clouded consciousness    Colovaginal fistula    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    Consecutive exotropia    Change in bowel habits    Constipation    Contact dermatitis    Controlled insulin dependent type 1 diabetes mellitus (Multi)    Type 2 diabetes mellitus    DDD (degenerative disc disease), thoracic    Dermatochalasis    Difficulty walking    Disorder of refraction    Disturbance, visual, subjective    Diverticulosis of colon    Dry eyes    Early cataract    Elevated creatine kinase    Essential (primary) hypertension    Fecal soiling due to fecal incontinence    Fistula involving female genital tract    Fracture of cervical vertebra    Gastroesophageal reflux disease    Generalized osteoarthritis of multiple sites    Hemorrhoids without complication    Iron deficiency anemia    Leukopenia    Lumbar radiculitis    Mixed hyperlipidemia    Myofascial pain    Numbness    Obesity with body mass index 30 or greater    Palsy of conjugate gaze    Poor venous access    Incisional hernia    Postlaminectomy syndrome, lumbar region    Pseudophakia of both eyes    Psoriasis    Recurrent urinary tract infection    Retinopathy, background, nonproliferative, mild    Skin excoriation    Swelling of upper arm    Vaginal disorder    Weakness generalized    Frequent falls    Lumbar compression fracture, closed, initial encounter (Multi)

## 2025-07-21 ENCOUNTER — TELEPHONE (OUTPATIENT)
Dept: PRIMARY CARE | Facility: CLINIC | Age: 70
End: 2025-07-21
Payer: MEDICARE

## 2025-07-21 DIAGNOSIS — M79.605 LEFT LEG PAIN: ICD-10-CM

## 2025-07-21 NOTE — TELEPHONE ENCOUNTER
Pt c/o left lower leg and knee pain. She states pain started yesterday. Denies trauma. Pt states leg and knee are swollen. Denies discoloration or warmth.

## 2025-07-21 NOTE — TELEPHONE ENCOUNTER
Xrays ordered as pt does want them done. Claim#[id number]. Pt advised of provider recommendations. Pt states she cannot ice area d/t psoriatic arthritis. I advised she should discuss new pains with rheumatologist d/t history of psoriatic arthritis. Pt states pain is unlike any pain she has every had. I advised pt that if she starts having new symptoms or pain becomes too much she needs to present to ER for evaluation.

## 2025-07-22 ENCOUNTER — TELEPHONE (OUTPATIENT)
Dept: RHEUMATOLOGY | Facility: CLINIC | Age: 70
End: 2025-07-22
Payer: MEDICARE

## 2025-07-22 ENCOUNTER — TELEPHONE (OUTPATIENT)
Dept: PRIMARY CARE | Facility: CLINIC | Age: 70
End: 2025-07-22
Payer: MEDICARE

## 2025-07-22 NOTE — TELEPHONE ENCOUNTER
KNEE 1 OR 2 VIEWS, LEFT   Comparison 5/19/25    Results: Radiographic views of the left knee demonstrates a stable total knee arthroplasty without hardware complication.  No acute fracture or dislocation.  No suspicious lytic or sclerotic lesions.  Atherosclerotic vascular calcification demonstrated.  No joint effusion or focal soft tissue swelling.     Conclusion: Stable left knee arthroplasty without acute osseous abnormality.     TIBIA and FIBULA 2V, LEFT  Comparison 5/19/25    Results: Radiographic views of the left tibia-fibula demonstrates stable knee arthroplasty without hardware loosening or complication.  The remaining lower leg is without acute fracture or dislocation.  Healed previously demonstrated fracture of the distal fibula.     No significant degenerative change of the ankle.  No suspicious lytic or sclerotic lesion.     No joint effusion or focal soft tissue abnormality.     Conclusion: Stable knee arthroplasty without acute osseous abnormality of the tibia-fibula.  Healed previously demonstrated fracture of the distal tibia.

## 2025-07-22 NOTE — TELEPHONE ENCOUNTER
Dilaudid and ibu  She had seen ankle ortho, Dr. Kaur Stock about 2 months ago and was told that she had a lot of ankle arthritis.   She has pain in her left leg when she walks or stands in 1 spot.  She cannot really ambulate.  Also when she lays down it will bother her but usually from the knee down to her ankle.  She has history of left knee replacement  She is uncertain if it is coming from her back.  The last time she had spine injections was in April.    She did get Humira but her first injection was a misfire.  Her second 1 is due tomorrow and her friend is going to administer it.She did have a co-pay for her Humira and is applying for patient assistance but is waiting for her Social Security information to come in through the mail.  When she takes her Dilaudid and ibuprofen it does help her with her pain.    At this time uncertain if her left leg is due to her back or due to her ankle.  She is not ambulatory.  Unable to drive since February.

## 2025-07-24 ENCOUNTER — DOCUMENTATION (OUTPATIENT)
Dept: PAIN MEDICINE | Facility: CLINIC | Age: 70
End: 2025-07-24
Payer: MEDICARE

## 2025-07-24 DIAGNOSIS — M54.16 LUMBAR RADICULITIS: ICD-10-CM

## 2025-07-24 DIAGNOSIS — M96.1 POSTLAMINECTOMY SYNDROME, LUMBAR REGION: ICD-10-CM

## 2025-07-24 NOTE — PROGRESS NOTES
Pt called complaining of severe bilateral anterior thigh and anterior calf pain. Pain worsens with walking and improves lying down. Continues to spend most of her time in a recliner due to this pain. Weakness in both legs persist but she reports it has not worsened since last office visit. Her rheumatologist feels pain is radicular,not related to her autoimmune. Pt is unsure if last caudal AGUSTIN done 3/2025 was beneficial. It is difficult for her to get to office due to her immobility and lack of transportation. She is asking if Dr Cisneros can try another injection. He reviwed most recent imaging and recommends L1-2 AGUSTIN to tx her adjacent level disease.. Pre procedure instructions reviewed..

## 2025-07-25 RX ORDER — DIAZEPAM 5 MG/1
TABLET ORAL
Qty: 2 TABLET | Refills: 0 | Status: SHIPPED | OUTPATIENT
Start: 2025-07-25

## 2025-07-28 PROCEDURE — RXMED WILLOW AMBULATORY MEDICATION CHARGE

## 2025-07-30 DIAGNOSIS — F41.9 ANXIETY: ICD-10-CM

## 2025-07-30 DIAGNOSIS — R60.0 LOWER EXTREMITY EDEMA: ICD-10-CM

## 2025-07-30 DIAGNOSIS — E06.3 AUTOIMMUNE THYROIDITIS: ICD-10-CM

## 2025-07-30 DIAGNOSIS — I10 ESSENTIAL (PRIMARY) HYPERTENSION: ICD-10-CM

## 2025-07-31 ENCOUNTER — HOSPITAL ENCOUNTER (OUTPATIENT)
Dept: PAIN MEDICINE | Facility: CLINIC | Age: 70
Discharge: HOME | End: 2025-07-31
Payer: MEDICARE

## 2025-07-31 VITALS
OXYGEN SATURATION: 98 % | RESPIRATION RATE: 16 BRPM | SYSTOLIC BLOOD PRESSURE: 155 MMHG | HEART RATE: 126 BPM | DIASTOLIC BLOOD PRESSURE: 77 MMHG

## 2025-07-31 DIAGNOSIS — M96.1 POSTLAMINECTOMY SYNDROME, LUMBAR REGION: ICD-10-CM

## 2025-07-31 PROCEDURE — 2550000001 HC RX 255 CONTRASTS: Mod: JW | Performed by: ANESTHESIOLOGY

## 2025-07-31 PROCEDURE — 62323 NJX INTERLAMINAR LMBR/SAC: CPT | Performed by: ANESTHESIOLOGY

## 2025-07-31 PROCEDURE — 2500000004 HC RX 250 GENERAL PHARMACY W/ HCPCS (ALT 636 FOR OP/ED): Performed by: ANESTHESIOLOGY

## 2025-07-31 PROCEDURE — 7100000010 HC PHASE TWO TIME - EACH INCREMENTAL 1 MINUTE

## 2025-07-31 PROCEDURE — 7100000009 HC PHASE TWO TIME - INITIAL BASE CHARGE

## 2025-07-31 PROCEDURE — 2500000005 HC RX 250 GENERAL PHARMACY W/O HCPCS: Performed by: ANESTHESIOLOGY

## 2025-07-31 RX ORDER — LIDOCAINE HYDROCHLORIDE 5 MG/ML
INJECTION, SOLUTION INFILTRATION; INTRAVENOUS AS NEEDED
Status: COMPLETED | OUTPATIENT
Start: 2025-07-31 | End: 2025-07-31

## 2025-07-31 RX ORDER — LISINOPRIL 20 MG/1
20 TABLET ORAL DAILY
Qty: 90 TABLET | Refills: 3 | Status: SHIPPED | OUTPATIENT
Start: 2025-07-31

## 2025-07-31 RX ORDER — METHYLPREDNISOLONE ACETATE 40 MG/ML
INJECTION, SUSPENSION INTRA-ARTICULAR; INTRALESIONAL; INTRAMUSCULAR; SOFT TISSUE AS NEEDED
Status: COMPLETED | OUTPATIENT
Start: 2025-07-31 | End: 2025-07-31

## 2025-07-31 RX ORDER — INDOMETHACIN 25 MG/1
CAPSULE ORAL AS NEEDED
Status: COMPLETED | OUTPATIENT
Start: 2025-07-31 | End: 2025-07-31

## 2025-07-31 RX ORDER — FUROSEMIDE 40 MG/1
40 TABLET ORAL DAILY
Qty: 90 TABLET | Refills: 3 | Status: SHIPPED | OUTPATIENT
Start: 2025-07-31

## 2025-07-31 RX ORDER — BUPROPION HYDROCHLORIDE 100 MG/1
100 TABLET, EXTENDED RELEASE ORAL DAILY
Qty: 90 TABLET | Refills: 3 | Status: SHIPPED | OUTPATIENT
Start: 2025-07-31

## 2025-07-31 RX ORDER — LEVOTHYROXINE SODIUM 75 UG/1
75 TABLET ORAL
Qty: 90 TABLET | Refills: 3 | Status: SHIPPED | OUTPATIENT
Start: 2025-07-31

## 2025-07-31 RX ORDER — SODIUM CHLORIDE 9 MG/ML
INJECTION, SOLUTION INTRAMUSCULAR; INTRAVENOUS; SUBCUTANEOUS AS NEEDED
Status: COMPLETED | OUTPATIENT
Start: 2025-07-31 | End: 2025-07-31

## 2025-07-31 RX ADMIN — METHYLPREDNISOLONE ACETATE 40 MG: 40 INJECTION, SUSPENSION INTRA-ARTICULAR; INTRALESIONAL; INTRAMUSCULAR; INTRASYNOVIAL; SOFT TISSUE at 10:06

## 2025-07-31 RX ADMIN — LIDOCAINE HYDROCHLORIDE 13 ML: 5 INJECTION, SOLUTION INFILTRATION at 10:06

## 2025-07-31 RX ADMIN — SODIUM BICARBONATE 1 MEQ: 84 INJECTION, SOLUTION INTRAVENOUS at 10:06

## 2025-07-31 RX ADMIN — IOHEXOL 5 ML: 240 INJECTION, SOLUTION INTRATHECAL; INTRAVASCULAR; INTRAVENOUS; ORAL at 10:06

## 2025-07-31 RX ADMIN — SODIUM CHLORIDE 5 ML: 9 INJECTION, SOLUTION INTRAMUSCULAR; INTRAVENOUS; SUBCUTANEOUS at 10:06

## 2025-07-31 ASSESSMENT — PAIN SCALES - GENERAL: PAINLEVEL_OUTOF10: 8

## 2025-07-31 ASSESSMENT — PAIN DESCRIPTION - DESCRIPTORS: DESCRIPTORS: SHOOTING;STABBING

## 2025-07-31 ASSESSMENT — PAIN - FUNCTIONAL ASSESSMENT: PAIN_FUNCTIONAL_ASSESSMENT: 0-10

## 2025-07-31 NOTE — H&P
Pain Management H&P    History Of Present Illness  Nesha Mcleod is a 70 y.o. female presents for procedure stated below. Endorses no changes in past medical history or medical health since last seen in clinic.      Past Medical History  She has a past medical history of Anxiety disorder, unspecified, Arthritis, Arthropathic psoriasis, unspecified (Multi), Chronic back pain, DM type 2 (diabetes mellitus, type 2) (Multi), GERD (gastroesophageal reflux disease), Hypertension, Injury of head (05/09/2024), Other chronic pain, Other conditions influencing health status, Personal history of diseases of the skin and subcutaneous tissue, Personal history of other diseases of the musculoskeletal system and connective tissue, and Personal history of other endocrine, nutritional and metabolic disease.    Surgical History  She has a past surgical history that includes Other surgical history; Appendectomy; Knee surgery; Knee surgery; Carpal tunnel release; Breast surgery (Bilateral); Cholecystectomy; Total knee arthroplasty (Left); Other surgical history; Other surgical history; Shoulder surgery; Shoulder surgery; Other surgical history (10/13/2016); Knee arthroscopy w/ debridement; Tonsillectomy; Foot surgery; Knee arthroscopy w/ debridement; Carpal tunnel release; Hysterectomy; Gallbladder surgery; Total knee arthroplasty; Hernia repair; Eye surgery; Neck surgery; Back surgery; Lipectomy (01/2014); Gastric bypass; Lipectomy; Cervical fusion; Esophagogastroduodenoscopy; and Incisional hernia repair (09/24/2020).     Social History  She reports that she has never smoked. She has never been exposed to tobacco smoke. She has never used smokeless tobacco. Alcohol use questions deferred to the physician. Drug use questions deferred to the physician.    Family History  Family History[1]     Allergies  Methotrexate; Trazodone; Adhesive tape-silicones; Bleach (sodium hypochlorite); Chlorine; Ciprofloxacin; Citalopram; Codeine;  Doxycycline; Latex; Morphine; Penicillins; Soap; Tetracycline; Amitriptyline; Grass pollen; Infliximab; Other; Rubber, unspecified; Trace metals; Weed pollen; Methocarbamol; Nickel; and Sulfa (sulfonamide antibiotics)    Review of Symptoms:   Constitutional: Negative for chills, diaphoresis or fever  HENT: Negative for neck swelling  Eyes:.  Negative for eye pain  Respiratory:.  Negative for cough, shortness of breath or wheezing    Cardiovascular:.  Negative for chest pain or palpitations  Gastrointestinal:.  Negative for abdominal pain, nausea and vomiting  Genitourinary:.  Negative for urgency  Musculoskeletal:  Positive for back pain. Positive for joint pain. Denies falls within the past 3 months.  Skin: Negative for wounds or itching   Neurological: Negative for dizziness, seizures, loss of consciousness and weakness  Endo/Heme/Allergies: Does not bruise/bleed easily  Psychiatric/Behavioral: Negative for depression. The patient does not appear anxious.      Pre-sedation Evaluation  ASA class 2  Mallampati score 2     PHYSICAL EXAM  Vitals signs reviewed  Constitutional:       General: Not in acute distress     Appearance: Normal appearance. Not ill-appearing.  HENT:     Head: Normocephalic and atraumatic  Eyes:     Conjunctiva/sclera: Conjunctivae normal  Cardiovascular:     Rate and Rhythm: Normal rate and regular rhythm  Pulmonary:     Effort: No respiratory distress  Abdominal:     Palpations: Abdomen is soft  Musculoskeletal: ZAFAR  Skin:     General: Skin is warm and dry  Neurological:     General: No focal deficit present  Psychiatric:         Mood and Affect: Mood normal         Behavior: Behavior normal     Last Recorded Vitals  There were no vitals taken for this visit.    Relevant Results  Current Outpatient Medications   Medication Instructions    acetaminophen (Tylenol) 325 mg tablet 2 tablets, Every 4 hours PRN    aspirin 81 mg chewable tablet 1 tablet, Daily    atorvastatin (LIPITOR) 40 mg, oral,  Daily    cinnamon bark extract 500 mg tablet Cinnamon 500 MG as directed Orally daily Active    cyclobenzaprine (FLEXERIL) 10 mg, oral, 2 times daily PRN    diazePAM (Valium) 5 mg tablet TAKE ONE TAB PO ONE HOUR PRIOR TO PROCEDURE. MAY REPEAT UPON ARRIVAL TO PROCEDURE    DULoxetine (CYMBALTA) 30 mg, oral, Daily, Do not crush or chew.    furosemide (LASIX) 40 mg, oral, Daily    gabapentin (NEURONTIN) 300 mg, oral, 2 times daily    Humira(CF) Pen 40 mg, subcutaneous, Every 14 days    HYDROmorphone (DILAUDID) 2 mg, oral, 3 times daily PRN    HYDROmorphone (DILAUDID) 2 mg, oral, 3 times daily PRN    [START ON 8/26/2025] HYDROmorphone (DILAUDID) 2 mg, oral, 3 times daily PRN    leflunomide (ARAVA) 10 mg, oral, Daily    levothyroxine (SYNTHROID, LEVOXYL) 75 mcg, oral, Daily before breakfast    lisinopril 20 mg, oral, Daily    magnesium oxide (Mag-Ox) 250 mg magnesium tablet 1 tablet, Daily    metFORMIN (GLUCOPHAGE) 500 mg, oral, 2 times daily (morning and late afternoon)    metFORMIN (GLUCOPHAGE) 500 mg, oral, 2 times daily (morning and late afternoon)    multivit-minerals/folic acid (CENTRUM ADULTS ORAL) 1 tablet, Daily    naloxone (NARCAN) 4 mg, nasal, As needed, May repeat every 2-3 minutes if needed, alternating nostrils, until medical assistance becomes available.    OneTouch Ultra Test USE 1 STRIP ONCE DAILY    potassium gluconate 595 mg (99 mg) tablet 1 tablet, Daily    Tresiba FlexTouch U-100 8 Units, subcutaneous, Every morning         XR hip right with pelvis when performed 2 or 3 views 03/12/2024    Narrative  Interpreted By:  Schoenberger, Joseph,  STUDY:  XR HIP RIGHT WITH PELVIS WHEN PERFORMED 2 OR 3 VIEWS; ;  3/12/2024  8:47 am    INDICATION:  Signs/Symptoms:pain.    COMPARISON:  None.    ACCESSION NUMBER(S):  [id number]    ORDERING CLINICIAN:  DORIS SANTOS    FINDINGS:  There are calcifications of the superolateral acetabular labrum. Mild  degenerative changes in the hip with some narrowing and  mild  spurring. No fracture or dislocation.    Impression  No definite acute findings. See discussion above.      MACRO:  None    Signed by: Joseph Schoenberger 3/13/2024 9:09 AM  Dictation workstation:   [id number]      XR hip right 2 or 3 views     Narrative  PROCEDURE:         HIP RT 2 VIEW W OR WO AP PELVIS - IXR  0057  REASON FOR EXAM: hip pain    RESULT: MRN: [id number]  Patient Name: LIZZIE PHILLIPS    STUDY:  HIP RT 2 VIEW W OR WO AP PELVIS 9/5/2023 10:55 am    INDICATION:  Right hip pain with history of fall 1 week ago    COMPARISON:  None available.    ACCESSION NUMBER(S):  [id number]    ORDERING CLINICIAN:  KATHY LOBO    TECHNIQUE:  AP view of the pelvis with AP and lateral views of the right hip.    FINDINGS:  No fracture or dislocation of the right hip is demonstrated. There is no  acute bony abnormality of the pelvis. There is chondrocalcinosis of the hips  bilaterally. Arterial calcification is also present. There are pelvic  phleboliths identified.    Impression  No fracture or dislocation of the right hip with no pelvic fracture  identified.    Chondrocalcinosis of the hips bilaterally.  Dictation workstation:   [id number]    Original Interpreting Physician:   LATOSHA FRANCOIS M.D.  Original Transcribed by/Date: MMODAL Sep  5 2023  9:48A  Original Electronically Signed by/Date: LATOSHA FRANCOIS M.D. Sep  5 2023 11:03A    Addendum Interpreting Physician:  Addendum Transcribed by/Date: NO ADDENDUM  Addendum Electronically Signed by/Date:      MR lumbar spine wo IV contrast     Narrative  PROCEDURE:         SPINE LUMBAR WO CONTRAST - CMR  2010  REASON FOR EXAM: SPINAL STENOSIS OF LUMBAR REGION WITH NEUROGENIC  CLAUDICATION    RESULT: MRN: [id number]  Patient Name: LIZZIE PHILLIPS    STUDY:  SPINE LUMBAR WO CONTRAST; 5/23/2023 1:16 pm    INDICATION:  SPINAL STENOSIS OF LUMBAR REGION WITH NEUROGENIC CLAUDICATION. 68-year-old  woman with chronic lower back pain and bilateral leg pain    COMPARISON:  Lumbar spine MRI  04/20/2021.    ACCESSION NUMBER(S):  [id number]    ORDERING CLINICIAN:  APRIL FLORES    TECHNIQUE:  Multiecho and multiplanar imaging of the lumbar spine was performed without  IV contrast.    FINDINGS:  Redemonstrated levoscoliosis of the lumbar spine. Interbody fusion of the L3  and L4 vertebral bodies again noted with laminectomy changes at L2, L3, and  L5 again seen.    Moderate to severe disc space narrowing is again noted at L1-L2, and L2-L3.      Alignment: There is grade 1 anterolisthesis of L5 on S1. There is grade 1  retrolisthesis of L2 on L3.      Bone marrow signal: Redemonstrated heterogeneous bone marrow signal, which  could be related to marrow reconversion process. Redemonstrated slightly  increased STIR signal within the L2 vertebral body, which may reflect  reactive bone marrow edema in the setting of degenerative change.      Conus: The conus medullaris terminates at the level of L1.      Paraspinal Soft tissues: Postsurgical changes in the posterior lumbar soft  tissues again noted.      Imaged Abdomen: Millimetric T2 hyperintense, T1 hypointense right and left  renal cysts are seen.      T12-L1: Moderate left paracentral disc bulge again noted causing effacement  of the left side of the ventral thecal sac. No significant spinal canal or  neural foraminal narrowing.      L1-2: Mild disc bulge with facet arthropathy is again noted, resulting in  mild spinal canal narrowing. There is also mild bilateral neural foraminal  narrowing, more pronounced on the right.      L2-3: Laminectomy changes again noted. There is broad-based disc osteophyte  with facet hypertrophy resulting in severe spinal canal stenosis. Severe  bilateral neural foraminal narrowing is identified with likely contact and  impingement upon the exiting bilateral nerve roots.      L3-4: Redemonstrated interbody fusion/ankylosis of the L3-L4 vertebral  bodies with laminectomy changes noted. Mild facet joint arthropathy.  No  significant spinal canal narrowing. Mild right neural foraminal narrowing.      L4-5: Spondylolisthesis with broad-based disc protrusion, facet hypertrophy,  and mild ligamentum flavum hypertrophy results in mild spinal canal  narrowing. There is also moderate right and mild-to-moderate left neural  foraminal narrowing.      L5-S1: Laminectomy changes again noted. No sign of spinal canal narrowing.  There is moderate facet joint hypertrophy. There is mild-to-moderate left  and mild right neural foraminal narrowing.    Impression  1. Redemonstrated postsurgical and degenerative changes of the lumbar spine,  with multilevel neural foraminal narrowing and spinal canal narrowing.    2. At L2-L3, there is severe bilateral neural foraminal narrowing and severe  spinal canal stenosis.    3. Please see above for full description of findings at each individual disc  level.    4. Redemonstrated heterogeneous bone marrow signal, as can be seen with  marrow reconversion process. Please correlate with appropriate clinical and  laboratory data.      Dictation workstation:   [id number]    Original Interpreting Physician:   SHAQ SOLO MD  Original Transcribed by/Date: MMODAL May 23 2023 12:15P  Original Electronically Signed by/Date: SHAQ SOLO MD May 23 2023  2:52P    Addendum Interpreting Physician:  Addendum Transcribed by/Date: NO ADDENDUM  Addendum Electronically Signed by/Date:       ASSESSMENT/PLAN  Nesha Mcleod is a 70 y.o. female here for L1/2 ILESI with fluoroscopic guidance      Patient denies any recent antibiotic use or infections, and denies contrast or local anesthetic allergies     Risks, benefits, alternatives discussed. All questions answered to the best of my ability. Patient agrees to proceed.      Our plan is as follows:  - Proceed with aforementioned procedure       Sai Altamirano DO  Chronic Pain Management Fellow         [1]   Family History  Problem Relation Name Age of Onset     Hypertension Mother      Asthma Mother      Arthritis Father      Heart disease Father      Arthritis Brother      Hypertension Brother      Hypertension Brother      Lymphoma Maternal Grandmother      Heart disease Paternal Grandfather

## 2025-07-31 NOTE — DISCHARGE INSTRUCTIONS
[facility]  [facility] [address]  [address] [id number] [phone]    POST PROCEDURE INSTRUCTIONS    Activity  Medication used during your procedure may cause some temporary weakness or numbness in your arms or legs, depending on the type of injection you received. It is recommended that you do not drive or operate machinery the day of your injection.  You do not need to stay in bed when you get home. You should be able to resume your normal activities, including work. However, any pre-existing physical restriction you had prior to the procedure may still remain.   Have a responsible adult with you if you received sedation for the procedure. Do not drive or operate machinery for 12 hours.    Pain  Immediate pain relief from the local anesthetic will wear off after several hours.  Prolonged relief from the steroid may take 3-14 days to occur.  Some patients may experience a “flare up” of their normal pain for a few days after the procedure. You may apply ice packs to the sore area for 15minutes on/ 2 hours off and take your prescribed or over the counter analgesics as needed.  Common side effects from the steroid include facial flushing, headaches,fluid retention,trouble sleeping,and cold like symptoms for 24-48 hours post procedure.  Patients receiving diagnostic injections (no steroid): pain relief is intended to be temporary. Pay attention to the percentage of pain relief that occurs compared to before your injection so you can report this to your doctor. Pain scores before and after the procedure need to be documented.    Medications  Resume your normal medications unless otherwise instructed  If you held your blood thinning medication for the procedure,you will be instructed on when to restart.    Injection site care  Keep the injection site clean and dry. You may shower and remove the Band Aid after you arrive home.  If you notice excessive  bleeding (slow general oozing that completely soaks the dressing or fresh bright red bleeding),apply pressure and call our office immediately.  Observe for signs of infection: increasing pain at injection site, redness, swelling, drainage with a foul smell, any associated fever or chills                        If you notice any of these, call our office immediately.    Diabetic patients   You may notice an elevation in blood sugar if steroid is used. Notify your primary care doctor if blood sugar levels do not return to normal.    Follow up  Call office to schedule  injection follow up appointment  with Dr. Cisneros in 3-4 weeks      Call our office at [phone] to speak to the clinical staff with any concerns or problems.  Go to the nearest emergency room if you are not able to reach us and your problem is urgent.  Call [phone] if you develop serious symptoms such as: chest pain or difficulty breathing.

## 2025-08-04 ENCOUNTER — SPECIALTY PHARMACY (OUTPATIENT)
Dept: PHARMACY | Facility: CLINIC | Age: 70
End: 2025-08-04

## 2025-08-04 ENCOUNTER — PHARMACY VISIT (OUTPATIENT)
Dept: PHARMACY | Facility: CLINIC | Age: 70
End: 2025-08-04
Payer: COMMERCIAL

## 2025-08-08 ENCOUNTER — HOSPITAL ENCOUNTER (EMERGENCY)
Facility: HOSPITAL | Age: 70
Discharge: HOME | End: 2025-08-08
Attending: STUDENT IN AN ORGANIZED HEALTH CARE EDUCATION/TRAINING PROGRAM
Payer: MEDICARE

## 2025-08-08 VITALS
SYSTOLIC BLOOD PRESSURE: 101 MMHG | TEMPERATURE: 97.9 F | RESPIRATION RATE: 15 BRPM | WEIGHT: 160 LBS | HEART RATE: 80 BPM | HEIGHT: 58 IN | OXYGEN SATURATION: 97 % | DIASTOLIC BLOOD PRESSURE: 60 MMHG | BODY MASS INDEX: 33.58 KG/M2

## 2025-08-08 DIAGNOSIS — G89.29 CHRONIC PAIN OF LEFT LOWER EXTREMITY: Primary | ICD-10-CM

## 2025-08-08 DIAGNOSIS — M79.605 CHRONIC PAIN OF LEFT LOWER EXTREMITY: Primary | ICD-10-CM

## 2025-08-08 PROCEDURE — 99284 EMERGENCY DEPT VISIT MOD MDM: CPT | Performed by: STUDENT IN AN ORGANIZED HEALTH CARE EDUCATION/TRAINING PROGRAM

## 2025-08-08 ASSESSMENT — PAIN DESCRIPTION - DESCRIPTORS: DESCRIPTORS: ACHING

## 2025-08-08 ASSESSMENT — PAIN - FUNCTIONAL ASSESSMENT: PAIN_FUNCTIONAL_ASSESSMENT: 0-10

## 2025-08-08 ASSESSMENT — PAIN SCALES - GENERAL: PAINLEVEL_OUTOF10: 8

## 2025-08-08 ASSESSMENT — PAIN DESCRIPTION - ORIENTATION: ORIENTATION: LEFT

## 2025-08-08 ASSESSMENT — PAIN DESCRIPTION - LOCATION: LOCATION: LEG

## 2025-08-08 ASSESSMENT — PAIN DESCRIPTION - PAIN TYPE: TYPE: CHRONIC PAIN

## 2025-08-08 NOTE — ED PROVIDER NOTES
Emergency Department Provider Note       History of Present Illness     History provided by: Patient  Limitations to History: None  HPI:  Nesha Mcleod is a 70 y.o. female presents to the emergency department with left-sided leg pain.  She notes that this has been chronic for a very long time.  She went to an orthopedist about 4 weeks ago and was diagnosed with arthritis in the ankle.  She also has had her left knee repaired 15 years ago.  She takes Dilaudid for pain chronically.  I reviewed the patient's PDMP and she was just prescribed this a few days ago.  I asked the patient about this.  She states that yes she has been taking this but has not been helping much.  She had an x-ray performed 2 weeks ago that did not show anything significant other than arthritis.  No new injuries or falls.  No weakness or numbness in the leg.    Physical Exam   Triage vitals:  T 36.6 °C (97.9 °F)  HR 80  /60  RR 15  O2 97 % None (Room air)    Physical Exam  Constitutional:       General: She is not in acute distress.  HENT:      Head: Normocephalic and atraumatic.      Right Ear: External ear normal.      Left Ear: External ear normal.      Nose: Nose normal. No rhinorrhea.      Mouth/Throat:      Pharynx: Oropharynx is clear.     Eyes:      Extraocular Movements: Extraocular movements intact.      Pupils: Pupils are equal, round, and reactive to light.       Cardiovascular:      Rate and Rhythm: Normal rate and regular rhythm.   Pulmonary:      Effort: Pulmonary effort is normal. No respiratory distress.      Breath sounds: No wheezing.   Abdominal:      General: Abdomen is flat.      Palpations: Abdomen is soft.      Tenderness: There is no guarding or rebound.     Musculoskeletal:         General: No deformity.      Cervical back: Normal range of motion and neck supple.      Comments: Full range of motion of the left lower extremity.  Intact strength and sensation.  No knee or ankle effusion.  No pain with logroll  of the left hip.  Flexion and extension intact.  No erythema or warmth.     Skin:     General: Skin is warm and dry.     Neurological:      General: No focal deficit present.      Mental Status: She is alert and oriented to person, place, and time.      Cranial Nerves: No cranial nerve deficit.     Psychiatric:         Mood and Affect: Mood normal.           Medical Decision Making & ED Course   Medical Decision Makin y.o. female patient presents to the emergency department with left leg pain.  After further discussion with the patient, it does seem that this is chronic.  She has had a workup with orthopedics as well as her pain medicine doctor.  She is taking oral Dilaudid for this chronically.  On my exam, there is no evidence of any acute injury, fracture, no suspicion for any infection or acute inflammation.  I discussed with the patient that we will not repeat the x-rays as those were just done and I will not give her any opioid pain medications.  She expresses understanding.  She will be discharged back to her apartment.  ED Course:  Diagnoses as of 25 09   Chronic pain of left lower extremity       Disposition   As a result of the work-up, the patient was discharged home.  she was informed of her diagnosis and instructed to come back with any concerns or worsening of condition.  she and was agreeable to the plan as discussed above.  she was given the opportunity to ask questions.  All of the patient's questions were answered.    Procedures   Procedures    Patient was seen independently    Mikhail Ponce MD  Emergency Medicine                                                       Mikhail Ponce MD  [phone]

## 2025-08-08 NOTE — DISCHARGE INSTRUCTIONS
Please return to your orthopedic doctor for further workup.  Please work with your pain management doctor for better symptom control.

## 2025-08-13 ENCOUNTER — DOCUMENTATION (OUTPATIENT)
Dept: PAIN MEDICINE | Facility: CLINIC | Age: 70
End: 2025-08-13
Payer: MEDICARE

## 2025-08-13 ENCOUNTER — TELEPHONE (OUTPATIENT)
Dept: PAIN MEDICINE | Facility: CLINIC | Age: 70
End: 2025-08-13
Payer: MEDICARE

## 2025-08-15 ENCOUNTER — APPOINTMENT (OUTPATIENT)
Dept: INFUSION THERAPY | Facility: CLINIC | Age: 70
End: 2025-08-15
Payer: MEDICARE

## 2025-08-26 ENCOUNTER — TELEPHONE (OUTPATIENT)
Dept: RHEUMATOLOGY | Facility: CLINIC | Age: 70
End: 2025-08-26
Payer: MEDICARE

## 2025-08-26 ENCOUNTER — SPECIALTY PHARMACY (OUTPATIENT)
Dept: PHARMACY | Facility: CLINIC | Age: 70
End: 2025-08-26

## 2025-08-26 DIAGNOSIS — L40.50 PSORIATIC ARTHRITIS (MULTI): ICD-10-CM

## 2025-08-26 PROCEDURE — RXMED WILLOW AMBULATORY MEDICATION CHARGE

## 2025-08-27 RX ORDER — LEFLUNOMIDE 10 MG/1
10 TABLET ORAL DAILY
Qty: 90 TABLET | Refills: 3 | Status: SHIPPED | OUTPATIENT
Start: 2025-08-27

## 2025-08-28 ENCOUNTER — HOSPITAL ENCOUNTER (OUTPATIENT)
Dept: RADIOLOGY | Facility: HOSPITAL | Age: 70
Discharge: HOME | End: 2025-08-28
Payer: MEDICARE

## 2025-08-28 ENCOUNTER — PHARMACY VISIT (OUTPATIENT)
Dept: PHARMACY | Facility: CLINIC | Age: 70
End: 2025-08-28
Payer: COMMERCIAL

## 2025-08-28 DIAGNOSIS — Z96.652 PRESENCE OF LEFT ARTIFICIAL KNEE JOINT: ICD-10-CM

## 2025-08-28 DIAGNOSIS — M25.562 PAIN IN LEFT KNEE: ICD-10-CM

## 2025-08-28 PROCEDURE — 73700 CT LOWER EXTREMITY W/O DYE: CPT | Mod: LT

## 2025-09-09 ENCOUNTER — APPOINTMENT (OUTPATIENT)
Dept: RHEUMATOLOGY | Facility: CLINIC | Age: 70
End: 2025-09-09
Payer: MEDICARE

## 2025-11-04 ENCOUNTER — APPOINTMENT (OUTPATIENT)
Dept: RHEUMATOLOGY | Facility: CLINIC | Age: 70
End: 2025-11-04
Payer: MEDICARE